# Patient Record
Sex: FEMALE | Race: WHITE | Employment: FULL TIME | URBAN - METROPOLITAN AREA
[De-identification: names, ages, dates, MRNs, and addresses within clinical notes are randomized per-mention and may not be internally consistent; named-entity substitution may affect disease eponyms.]

---

## 2017-06-10 ENCOUNTER — HOSPITAL ENCOUNTER (OUTPATIENT)
Dept: GENERAL RADIOLOGY | Facility: HOSPITAL | Age: 57
Discharge: HOME | End: 2017-06-10
Attending: INTERNAL MEDICINE
Payer: COMMERCIAL

## 2017-06-10 ENCOUNTER — LAB ENCOUNTER (OUTPATIENT)
Dept: LAB | Facility: HOSPITAL | Age: 57
End: 2017-06-10
Attending: INTERNAL MEDICINE
Payer: COMMERCIAL

## 2017-06-10 ENCOUNTER — OFFICE VISIT (OUTPATIENT)
Dept: INTERNAL MEDICINE CLINIC | Facility: CLINIC | Age: 57
End: 2017-06-10

## 2017-06-10 ENCOUNTER — PRIOR ORIGINAL RECORDS (OUTPATIENT)
Dept: OTHER | Age: 57
End: 2017-06-10

## 2017-06-10 VITALS
DIASTOLIC BLOOD PRESSURE: 80 MMHG | HEIGHT: 63.2 IN | TEMPERATURE: 98 F | WEIGHT: 138.31 LBS | HEART RATE: 116 BPM | BODY MASS INDEX: 24.2 KG/M2 | SYSTOLIC BLOOD PRESSURE: 124 MMHG | RESPIRATION RATE: 18 BRPM

## 2017-06-10 DIAGNOSIS — R06.00 DYSPNEA, UNSPECIFIED TYPE: ICD-10-CM

## 2017-06-10 DIAGNOSIS — Z00.00 ROUTINE PHYSICAL EXAMINATION: ICD-10-CM

## 2017-06-10 DIAGNOSIS — Z00.00 ROUTINE GENERAL MEDICAL EXAMINATION AT A HEALTH CARE FACILITY: Primary | ICD-10-CM

## 2017-06-10 DIAGNOSIS — Z00.00 ROUTINE PHYSICAL EXAMINATION: Primary | ICD-10-CM

## 2017-06-10 PROCEDURE — 36415 COLL VENOUS BLD VENIPUNCTURE: CPT

## 2017-06-10 PROCEDURE — 80061 LIPID PANEL: CPT

## 2017-06-10 PROCEDURE — 99386 PREV VISIT NEW AGE 40-64: CPT | Performed by: INTERNAL MEDICINE

## 2017-06-10 PROCEDURE — 85025 COMPLETE CBC W/AUTO DIFF WBC: CPT

## 2017-06-10 PROCEDURE — 80053 COMPREHEN METABOLIC PANEL: CPT

## 2017-06-10 PROCEDURE — 93010 ELECTROCARDIOGRAM REPORT: CPT | Performed by: INTERNAL MEDICINE

## 2017-06-10 PROCEDURE — 84443 ASSAY THYROID STIM HORMONE: CPT

## 2017-06-10 PROCEDURE — 93005 ELECTROCARDIOGRAM TRACING: CPT

## 2017-06-10 PROCEDURE — 82607 VITAMIN B-12: CPT

## 2017-06-10 PROCEDURE — 71020 XR CHEST PA + LAT CHEST (CPT=71020): CPT | Performed by: INTERNAL MEDICINE

## 2017-06-10 NOTE — PROGRESS NOTES
HPI:    Patient ID: Tabitha Culp is a 56year old female. HPI  Patient is here for her first office visit requesting physical exam.  Also with complaints of feeling fatigued and short of breath.   She has no significant past medical history or chronic Negative for joint pain. Skin: Negative for rash. Neurological: Positive for numbness and headaches. Negative for weakness. Hematological: Does not bruise/bleed easily. Psychiatric/Behavioral: Negative for depressed mood.  The patient is not nervous fibrillation. Exam otherwise normal.  We will check blood work, chest x-ray, and EKG. Contact patient with results. Further treatment and evaluation pending those initial results.   Encouraged to continue with healthy diet, regular exercise, maintaining

## 2017-06-12 ENCOUNTER — TELEPHONE (OUTPATIENT)
Dept: INTERNAL MEDICINE CLINIC | Facility: CLINIC | Age: 57
End: 2017-06-12

## 2017-06-12 NOTE — TELEPHONE ENCOUNTER
Pt called, message per Dr given.   Verbalized understanding and compliance  Scheduled to see PCP 6/15/17 in TCM appt time

## 2017-06-12 NOTE — TELEPHONE ENCOUNTER
----- Message from Lauren Vo MD sent at 6/11/2017  9:37 PM CDT -----  Please contact patient and review the results of the chest x-ray, EKG, and blood work. Blood work shows some borderline blood sugar and LDL cholesterol.   This is not concerning ri

## 2017-06-14 ENCOUNTER — HOSPITAL ENCOUNTER (EMERGENCY)
Facility: HOSPITAL | Age: 57
Discharge: HOME | End: 2017-06-14
Attending: EMERGENCY MEDICINE
Payer: COMMERCIAL

## 2017-06-14 ENCOUNTER — APPOINTMENT (OUTPATIENT)
Dept: GENERAL RADIOLOGY | Facility: HOSPITAL | Age: 57
End: 2017-06-14
Attending: EMERGENCY MEDICINE
Payer: COMMERCIAL

## 2017-06-14 ENCOUNTER — TELEPHONE (OUTPATIENT)
Dept: INTERNAL MEDICINE CLINIC | Facility: CLINIC | Age: 57
End: 2017-06-14

## 2017-06-14 VITALS
HEIGHT: 63 IN | WEIGHT: 150 LBS | DIASTOLIC BLOOD PRESSURE: 87 MMHG | BODY MASS INDEX: 26.58 KG/M2 | SYSTOLIC BLOOD PRESSURE: 112 MMHG | HEART RATE: 91 BPM | RESPIRATION RATE: 18 BRPM | OXYGEN SATURATION: 98 %

## 2017-06-14 DIAGNOSIS — S83.005A PATELLAR DISLOCATION, LEFT, INITIAL ENCOUNTER: Primary | ICD-10-CM

## 2017-06-14 PROCEDURE — 27560 TREAT KNEECAP DISLOCATION: CPT

## 2017-06-14 PROCEDURE — 96374 THER/PROPH/DIAG INJ IV PUSH: CPT

## 2017-06-14 PROCEDURE — 99284 EMERGENCY DEPT VISIT MOD MDM: CPT

## 2017-06-14 PROCEDURE — 73560 X-RAY EXAM OF KNEE 1 OR 2: CPT | Performed by: EMERGENCY MEDICINE

## 2017-06-14 RX ORDER — HYDROMORPHONE HYDROCHLORIDE 1 MG/ML
INJECTION, SOLUTION INTRAMUSCULAR; INTRAVENOUS; SUBCUTANEOUS
Status: COMPLETED
Start: 2017-06-14 | End: 2017-06-14

## 2017-06-14 RX ORDER — TRAMADOL HYDROCHLORIDE 50 MG/1
TABLET ORAL EVERY 4 HOURS PRN
Qty: 10 TABLET | Refills: 0 | Status: ON HOLD | OUTPATIENT
Start: 2017-06-14 | End: 2017-08-11

## 2017-06-14 RX ORDER — HYDROMORPHONE HYDROCHLORIDE 1 MG/ML
1 INJECTION, SOLUTION INTRAMUSCULAR; INTRAVENOUS; SUBCUTANEOUS ONCE
Status: COMPLETED | OUTPATIENT
Start: 2017-06-14 | End: 2017-06-14

## 2017-06-14 NOTE — TELEPHONE ENCOUNTER
Actions Requested: advise / FYI  Situation/Background   Problem: while sitting at computer suddenly right eye went black for 10 seconds and had dizziness   Onset: < 1 hour ago   Associated Symptoms: admits to not have eaten at that time and only coffee onb symptoms)  * Patient sounds very sick or weak to the triager  * Flashes of light  (Exception: brief from pressing on the eyeball)  * Eye pain and brief (now gone) blurred vision or visual changes  * Taking digoxin (e.g., Lanoxin, Digitek, Cardoxin, Lanoxic

## 2017-06-14 NOTE — TELEPHONE ENCOUNTER
Patient needs to be seen by eye doctor within the next day or so. I doubt this has anything to do with the recent issues of her heart arrhythmia.

## 2017-06-14 NOTE — TELEPHONE ENCOUNTER
Pt saw ophthalmologist Dr. Nathalia Leary (exam negative) about an hour ago and now seeing a retinal specialist, Beti Swift, Dr Nathalia Leary just wanted to make sure that there is nothing going on behind the eye, he told her her sxs were not normal and would not be rel

## 2017-06-14 NOTE — ED PROVIDER NOTES
Patient Seen in: [facility] AND [facility] Emergency Department    History   Patient presents with:  Lower Extremity Injury (musculoskeletal)    Stated Complaint: left knee dislocation    HPI    61-year-old female with history of patellar dislocation at age 26 a non tender, lungs are clear to auscultation  Cardiac: regular rate and rhythm  Musculoskeletal: Obvious lateral patellar dislocation to the left knee. No leg, ankle, or hip tenderness noted. Bilateral dorsalis pedis pulses intact and symmetric.   Neurolog

## 2017-06-15 ENCOUNTER — OFFICE VISIT (OUTPATIENT)
Dept: INTERNAL MEDICINE CLINIC | Facility: CLINIC | Age: 57
End: 2017-06-15

## 2017-06-15 VITALS
HEIGHT: 63.2 IN | BODY MASS INDEX: 24.15 KG/M2 | RESPIRATION RATE: 18 BRPM | TEMPERATURE: 99 F | WEIGHT: 138 LBS | HEART RATE: 106 BPM | SYSTOLIC BLOOD PRESSURE: 102 MMHG | DIASTOLIC BLOOD PRESSURE: 70 MMHG

## 2017-06-15 DIAGNOSIS — I48.91 ATRIAL FIBRILLATION, UNSPECIFIED TYPE (HCC): Primary | ICD-10-CM

## 2017-06-15 DIAGNOSIS — Z12.31 ENCOUNTER FOR SCREENING MAMMOGRAM FOR BREAST CANCER: ICD-10-CM

## 2017-06-15 PROCEDURE — 99213 OFFICE O/P EST LOW 20 MIN: CPT | Performed by: INTERNAL MEDICINE

## 2017-06-15 PROCEDURE — 99212 OFFICE O/P EST SF 10 MIN: CPT | Performed by: INTERNAL MEDICINE

## 2017-06-18 PROBLEM — I48.91 ATRIAL FIBRILLATION (HCC): Status: ACTIVE | Noted: 2017-06-18

## 2017-06-18 NOTE — PROGRESS NOTES
HPI:    Patient ID: La Nena Herrera is a 56year old female. HPI  Patient is here for follow-up on recent EKG finding. She was seen here a week or so ago. Is not feeling well. Heart rate was irregular. EKG showed atrial fibrillation.   Blood work was is nervous/anxious. Current Outpatient Prescriptions:  TraMADol HCl 50 MG Oral Tab Take 1-2 tablets ( mg total) by mouth every 4 (four) hours as needed for Pain.  Disp: 10 tablet Rfl: 0     Allergies:  Pcn [Penicillins]          PHYSICAL E

## 2017-06-20 ENCOUNTER — HOSPITAL ENCOUNTER (OUTPATIENT)
Dept: CV DIAGNOSTICS | Facility: HOSPITAL | Age: 57
Discharge: HOME | End: 2017-06-20
Attending: INTERNAL MEDICINE
Payer: COMMERCIAL

## 2017-06-20 DIAGNOSIS — I48.91 ATRIAL FIBRILLATION, UNSPECIFIED TYPE (HCC): ICD-10-CM

## 2017-06-20 PROCEDURE — 93306 TTE W/DOPPLER COMPLETE: CPT | Performed by: INTERNAL MEDICINE

## 2017-06-21 ENCOUNTER — TELEPHONE (OUTPATIENT)
Dept: INTERNAL MEDICINE CLINIC | Facility: CLINIC | Age: 57
End: 2017-06-21

## 2017-06-21 NOTE — TELEPHONE ENCOUNTER
Dr. Deon Bruce please see message below. Thank you    [facility] was informed that Dr. Pipo Baig would be assisting to have pt be seen sooner by  or possibly be prescribed medications until she could be seen by Cardiology. [facility] appt is scheduled for 7/12/17.  Echo

## 2017-06-21 NOTE — TELEPHONE ENCOUNTER
Pt requesting to speak with Augusta/IGNACIO  Said she was to call office today after having Echo 2D with Doppler  JSK was to assist in getting sooner appt with Cardiology  Or if medication would be prescribed  until she could be seen by Cardiology  Would like ca

## 2017-06-24 NOTE — TELEPHONE ENCOUNTER
I spoke at length with the patient regarding the echocardiogram findings. They showed decreased left ventricular ejection fraction at 30-35%. Also still with the atrial fibrillation.   Based on these issues, we will go ahead and start her on blood thinner

## 2017-06-26 NOTE — TELEPHONE ENCOUNTER
Dr. Lucien Blanco - Per Cardiology office, they can access the echo, EKG and your notes through [employer]. Thank you.

## 2017-06-26 NOTE — TELEPHONE ENCOUNTER
Dr. Bernard Calvert [facility] - patient called and stated she cannot have appointments for Mondays and Wednesdays. She will call Cardiology office and reschedule. I did fax the copies of her Echo, EKG and office visit notes at her request to your office.

## 2017-06-26 NOTE — TELEPHONE ENCOUNTER
Dr. Deon [employer] to change appointment to Wednesday, June 28th at 0830 am.  Left message on patient's VM to call back.

## 2017-06-30 ENCOUNTER — OFFICE VISIT (OUTPATIENT)
Dept: ORTHOPEDICS CLINIC | Facility: CLINIC | Age: 57
End: 2017-06-30

## 2017-06-30 DIAGNOSIS — S83.005A PATELLAR DISLOCATION, LEFT, INITIAL ENCOUNTER: Primary | ICD-10-CM

## 2017-06-30 PROCEDURE — A4467 BELT STRAP SLEEV GRMNT COVER: HCPCS | Performed by: ORTHOPAEDIC SURGERY

## 2017-06-30 PROCEDURE — 99212 OFFICE O/P EST SF 10 MIN: CPT | Performed by: ORTHOPAEDIC SURGERY

## 2017-06-30 PROCEDURE — 99203 OFFICE O/P NEW LOW 30 MIN: CPT | Performed by: ORTHOPAEDIC SURGERY

## 2017-06-30 RX ORDER — METOPROLOL SUCCINATE 50 MG/1
25 TABLET, EXTENDED RELEASE ORAL NIGHTLY
Refills: 2 | COMMUNITY
Start: 2017-06-29 | End: 2017-10-26 | Stop reason: DRUGHIGH

## 2017-06-30 RX ORDER — LISINOPRIL 5 MG/1
5 TABLET ORAL NIGHTLY
Refills: 2 | COMMUNITY
Start: 2017-06-29 | End: 2017-08-22

## 2017-06-30 NOTE — PROGRESS NOTES
6/30/2017  La Nena Sharon  16/1960  56year old   female  Miguel Krishna MD    HPI:   Patient presents with:  Knee Pain: Left - onset 6/14/17 when she dislocated her patella and she ended up in the ER - has x-rays [facility] in the system - had got a brace Heart valve disease   • Cancer Mother      ovarian      Smoking status: Never Smoker                                                              Alcohol use: Yes           0.0 oz/week          REVIEW OF SYSTEMS:   A 12 point review of systems was perf

## 2017-07-07 ENCOUNTER — HOSPITAL ENCOUNTER (OUTPATIENT)
Dept: MAMMOGRAPHY | Age: 57
Discharge: HOME | End: 2017-07-07
Attending: INTERNAL MEDICINE
Payer: COMMERCIAL

## 2017-07-07 DIAGNOSIS — Z12.31 ENCOUNTER FOR SCREENING MAMMOGRAM FOR BREAST CANCER: ICD-10-CM

## 2017-07-07 PROCEDURE — 77067 SCR MAMMO BI INCL CAD: CPT | Performed by: INTERNAL MEDICINE

## 2017-07-11 ENCOUNTER — OFFICE VISIT (OUTPATIENT)
Dept: PHYSICAL THERAPY | Age: 57
End: 2017-07-11
Attending: ORTHOPAEDIC SURGERY
Payer: COMMERCIAL

## 2017-07-11 DIAGNOSIS — S83.005A PATELLAR DISLOCATION, LEFT, INITIAL ENCOUNTER: ICD-10-CM

## 2017-07-11 PROCEDURE — 97162 PT EVAL MOD COMPLEX 30 MIN: CPT

## 2017-07-11 PROCEDURE — 97110 THERAPEUTIC EXERCISES: CPT

## 2017-07-11 NOTE — PROGRESS NOTES
PHYSICAL THERAPY EVALUATION:   Referring Physician: Dr. Mary Ayala  Diagnosis: Patellar dislocation, left,   Date of Onset: June 14,2107 Date of Service: 7/11/2017     PATIENT SUMMARY     Shilpi Gallagher is a 56year old y/o female who presents to therapy tod function            ASSESSMENT:   Paulino Vaca presented to therapy with diagnosis of Patellar dislocation, left,  . Paulino Vaca presented with following co-morbidities of newly diagnosed atrial fibrillation and states that her heart is only functioning by 30%.  She PLAN OF CARE:    Goals:    1. Pt will be I with HEP,its progression and management of symptoms  2. Pt will improve L knee extension to 0 and flexion to 130 for ease of donning/doffing shoes and socks  3. Pt will increase LLE strength and R hip strength to ha

## 2017-07-12 NOTE — TELEPHONE ENCOUNTER
[facility]   Transfer to [phone]  Patient was to call Cardiology office to reschedule appointment, instead showed up for cancelled appointment.

## 2017-07-14 ENCOUNTER — OFFICE VISIT (OUTPATIENT)
Dept: PHYSICAL THERAPY | Age: 57
End: 2017-07-14
Attending: ORTHOPAEDIC SURGERY
Payer: COMMERCIAL

## 2017-07-14 NOTE — PROGRESS NOTES
Dx: Patellar dislocation, left,                        Next MD visit: none scheduled  Fall Risk: standard         Precautions: n/a           Medications: Yes/no: no  Subjective: Pt  Pain level: 2/10    Objective:     Date:7/14/17 Date    Date    Date    Da

## 2017-07-18 ENCOUNTER — APPOINTMENT (OUTPATIENT)
Dept: PHYSICAL THERAPY | Age: 57
End: 2017-07-18
Attending: ORTHOPAEDIC SURGERY
Payer: COMMERCIAL

## 2017-07-21 ENCOUNTER — APPOINTMENT (OUTPATIENT)
Dept: PHYSICAL THERAPY | Age: 57
End: 2017-07-21
Attending: ORTHOPAEDIC SURGERY
Payer: COMMERCIAL

## 2017-07-25 ENCOUNTER — TELEPHONE (OUTPATIENT)
Dept: CARDIOLOGY CLINIC | Facility: CLINIC | Age: 57
End: 2017-07-25

## 2017-07-25 ENCOUNTER — OFFICE VISIT (OUTPATIENT)
Dept: CARDIOLOGY CLINIC | Facility: CLINIC | Age: 57
End: 2017-07-25

## 2017-07-25 ENCOUNTER — APPOINTMENT (OUTPATIENT)
Dept: PHYSICAL THERAPY | Age: 57
End: 2017-07-25
Attending: ORTHOPAEDIC SURGERY
Payer: COMMERCIAL

## 2017-07-25 VITALS
BODY MASS INDEX: 24 KG/M2 | HEART RATE: 68 BPM | RESPIRATION RATE: 20 BRPM | DIASTOLIC BLOOD PRESSURE: 58 MMHG | WEIGHT: 137 LBS | SYSTOLIC BLOOD PRESSURE: 100 MMHG

## 2017-07-25 DIAGNOSIS — I48.91 ATRIAL FIBRILLATION, UNSPECIFIED TYPE (HCC): Primary | ICD-10-CM

## 2017-07-25 DIAGNOSIS — I42.0 DILATED CARDIOMYOPATHY (HCC): ICD-10-CM

## 2017-07-25 DIAGNOSIS — E78.00 HYPERCHOLESTEREMIA: ICD-10-CM

## 2017-07-25 PROCEDURE — 99212 OFFICE O/P EST SF 10 MIN: CPT | Performed by: INTERNAL MEDICINE

## 2017-07-25 PROCEDURE — 99244 OFF/OP CNSLTJ NEW/EST MOD 40: CPT | Performed by: INTERNAL MEDICINE

## 2017-07-25 NOTE — H&P
Donna Marlow is a 56year old female. HPI:   This is a pleasant 82-year-old with insignificant cardiac history who now presents for a second opinion after she was found to have atrial fibrillation in June during a routine exam by her primary.   Patient s 2014    Surgical repair; ok since       Social History:  Smoking status: Never Smoker                                                              Alcohol use: Yes           0.0 oz/week       REVIEW OF SYSTEMS:   GENERAL HEALTH: feels well otherwise  SKIN: tachycardia from her A. fib or viral in origin. She is on good medicines at this time which can be titrated as tolerated especially the metoprolol to diminish palpitations.   Keep lisinopril at the present dose for now we will reassess her LV function afte

## 2017-07-25 NOTE — PATIENT INSTRUCTIONS
Call with medication doses so we can adjust metoprolol if needed  Get a copy of your CT scan of the heart and results to be reviewed  If testing is stable will arrange for cardioversion in 2 weeks

## 2017-07-25 NOTE — TELEPHONE ENCOUNTER
Pt just saw MDB--she is taking Metoprolol 50mg tabs, once at bedtime. Pt would like to know if MDB wants to increase dose to twice daily?

## 2017-07-25 NOTE — TELEPHONE ENCOUNTER
Informed pt per [facility] message below. Increase Metoprolol 75mg. Verbalized understanding. Per pt  Awaiting call from Dr. Chuy Light regarding test results.

## 2017-07-26 ENCOUNTER — TELEPHONE (OUTPATIENT)
Dept: CARDIOLOGY CLINIC | Facility: CLINIC | Age: 57
End: 2017-07-26

## 2017-07-26 DIAGNOSIS — I42.9 CARDIOMYOPATHY, UNSPECIFIED TYPE (HCC): ICD-10-CM

## 2017-07-26 DIAGNOSIS — Z01.818 ENCOUNTER FOR PREADMISSION TESTING: Primary | ICD-10-CM

## 2017-07-26 DIAGNOSIS — I48.91 ATRIAL FIBRILLATION, UNSPECIFIED TYPE (HCC): ICD-10-CM

## 2017-07-27 ENCOUNTER — APPOINTMENT (OUTPATIENT)
Dept: PHYSICAL THERAPY | Age: 57
End: 2017-07-27
Attending: ORTHOPAEDIC SURGERY
Payer: COMMERCIAL

## 2017-07-27 NOTE — TELEPHONE ENCOUNTER
S/w Pablo ROBERTO [facility] [place] (back of card)- no PA needed for cardioversion. Pt made aware.

## 2017-07-31 ENCOUNTER — TELEPHONE (OUTPATIENT)
Dept: FAMILY MEDICINE CLINIC | Facility: CLINIC | Age: 57
End: 2017-07-31

## 2017-07-31 NOTE — TELEPHONE ENCOUNTER
----- Message from Micah Vasquez MD sent at 7/28/2017  3:28 PM CDT -----  Noted.  Please call the patient and make sure that the [address] has contacted her to get the additional views

## 2017-07-31 NOTE — TELEPHONE ENCOUNTER
Dr Arvind Cline,    Called [facility] dept [phone] (option*) Stefany Neri confirmed pt has not scheduled her mmg add'l views.    Pt confirmed not done yet because dealing with heart issues with Dr Anil Pro first. Pt is having cardioversion done on August 11th by Dr Dowd Child

## 2017-08-01 ENCOUNTER — APPOINTMENT (OUTPATIENT)
Dept: PHYSICAL THERAPY | Age: 57
End: 2017-08-01
Attending: ORTHOPAEDIC SURGERY
Payer: COMMERCIAL

## 2017-08-04 ENCOUNTER — APPOINTMENT (OUTPATIENT)
Dept: PHYSICAL THERAPY | Age: 57
End: 2017-08-04
Attending: ORTHOPAEDIC SURGERY
Payer: COMMERCIAL

## 2017-08-08 ENCOUNTER — LAB ENCOUNTER (OUTPATIENT)
Dept: LAB | Age: 57
End: 2017-08-08
Attending: INTERNAL MEDICINE
Payer: COMMERCIAL

## 2017-08-08 DIAGNOSIS — Z01.818 ENCOUNTER FOR PREADMISSION TESTING: Primary | ICD-10-CM

## 2017-08-08 DIAGNOSIS — I42.9 CARDIOMYOPATHY, UNSPECIFIED TYPE (HCC): ICD-10-CM

## 2017-08-08 DIAGNOSIS — I48.91 ATRIAL FIBRILLATION, UNSPECIFIED TYPE (HCC): ICD-10-CM

## 2017-08-08 LAB
ANION GAP SERPL CALC-SCNC: 8 MMOL/L (ref 0–18)
B-HCG UR QL: NEGATIVE
BUN SERPL-MCNC: 16 MG/DL (ref 8–20)
BUN/CREAT SERPL: 18.6 (ref 10–20)
CALCIUM SERPL-MCNC: 9.3 MG/DL (ref 8.5–10.5)
CHLORIDE SERPL-SCNC: 104 MMOL/L (ref 95–110)
CO2 SERPL-SCNC: 25 MMOL/L (ref 22–32)
CREAT SERPL-MCNC: 0.86 MG/DL (ref 0.5–1.5)
GLUCOSE SERPL-MCNC: 116 MG/DL (ref 70–99)
OSMOLALITY UR CALC.SUM OF ELEC: 286 MOSM/KG (ref 275–295)
POTASSIUM SERPL-SCNC: 4.4 MMOL/L (ref 3.3–5.1)
SODIUM SERPL-SCNC: 137 MMOL/L (ref 136–144)

## 2017-08-08 PROCEDURE — 36415 COLL VENOUS BLD VENIPUNCTURE: CPT

## 2017-08-08 PROCEDURE — 81025 URINE PREGNANCY TEST: CPT

## 2017-08-08 PROCEDURE — 80048 BASIC METABOLIC PNL TOTAL CA: CPT

## 2017-08-10 ENCOUNTER — TELEPHONE (OUTPATIENT)
Dept: CARDIOLOGY CLINIC | Facility: CLINIC | Age: 57
End: 2017-08-10

## 2017-08-10 RX ORDER — SODIUM CHLORIDE 9 MG/ML
INJECTION, SOLUTION INTRAVENOUS ONCE
Status: COMPLETED | OUTPATIENT
Start: 2017-08-11 | End: 2017-08-11

## 2017-08-10 NOTE — TELEPHONE ENCOUNTER
Pt called to get time for procedure with MB  Tomorrow. Pt would also like to know if she needs to fast for procedure.  Please Call Thank yOu

## 2017-08-10 NOTE — TELEPHONE ENCOUNTER
S/w cath lab RN- was busy, will call pt shortly. Left vm to pt that cath lab RN will contact pt soon.

## 2017-08-11 ENCOUNTER — HOSPITAL ENCOUNTER (OUTPATIENT)
Dept: INTERVENTIONAL RADIOLOGY/VASCULAR | Facility: HOSPITAL | Age: 57
Discharge: HOME | End: 2017-08-11
Attending: INTERNAL MEDICINE | Admitting: INTERNAL MEDICINE
Payer: COMMERCIAL

## 2017-08-11 VITALS
WEIGHT: 136 LBS | SYSTOLIC BLOOD PRESSURE: 100 MMHG | OXYGEN SATURATION: 99 % | BODY MASS INDEX: 24 KG/M2 | DIASTOLIC BLOOD PRESSURE: 56 MMHG | HEART RATE: 52 BPM | RESPIRATION RATE: 15 BRPM

## 2017-08-11 DIAGNOSIS — I48.91 A-FIB (HCC): ICD-10-CM

## 2017-08-11 DIAGNOSIS — I42.9 CARDIOMYOPATHY (HCC): ICD-10-CM

## 2017-08-11 PROCEDURE — 93005 ELECTROCARDIOGRAM TRACING: CPT

## 2017-08-11 PROCEDURE — 93010 ELECTROCARDIOGRAM REPORT: CPT | Performed by: INTERNAL MEDICINE

## 2017-08-11 PROCEDURE — 5A2204Z RESTORATION OF CARDIAC RHYTHM, SINGLE: ICD-10-PCS | Performed by: INTERNAL MEDICINE

## 2017-08-11 PROCEDURE — 92960 CARDIOVERSION ELECTRIC EXT: CPT

## 2017-08-11 RX ORDER — SODIUM CHLORIDE 9 MG/ML
INJECTION, SOLUTION INTRAVENOUS
Status: COMPLETED
Start: 2017-08-11 | End: 2017-08-11

## 2017-08-11 RX ADMIN — SODIUM CHLORIDE: 9 INJECTION, SOLUTION INTRAVENOUS at 08:00:00

## 2017-08-11 NOTE — PROGRESS NOTES
Cardiolgy:  Procedure: Cardioversion  Indication: atrial fibrillation  Diagnosis post procedure: Sinus rhythm  Sedation Brevitol   45 Mg  Patient cardioverted with    200  Joules sync. to sinus rhythm  No complications    Nohemy Velasquez MD [facility]

## 2017-08-21 ENCOUNTER — TELEPHONE (OUTPATIENT)
Dept: CARDIOLOGY CLINIC | Facility: CLINIC | Age: 57
End: 2017-08-21

## 2017-08-21 NOTE — TELEPHONE ENCOUNTER
Pt states she thinks she went into afib. She states she has noticed it is difficult for her to lay flat and feels a gurgling/wheezing in her throat. She has been havng a difficult tiME sleeping as well. She was advised to see MDB tomorrow. appt scheduled.

## 2017-08-22 ENCOUNTER — OFFICE VISIT (OUTPATIENT)
Dept: CARDIOLOGY CLINIC | Facility: CLINIC | Age: 57
End: 2017-08-22

## 2017-08-22 VITALS
HEART RATE: 42 BPM | DIASTOLIC BLOOD PRESSURE: 70 MMHG | HEIGHT: 63.5 IN | SYSTOLIC BLOOD PRESSURE: 105 MMHG | BODY MASS INDEX: 24.5 KG/M2 | WEIGHT: 140 LBS | OXYGEN SATURATION: 97 %

## 2017-08-22 DIAGNOSIS — I48.91 ATRIAL FIBRILLATION, UNSPECIFIED TYPE (HCC): Primary | ICD-10-CM

## 2017-08-22 DIAGNOSIS — I42.0 DILATED CARDIOMYOPATHY (HCC): ICD-10-CM

## 2017-08-22 PROCEDURE — 99214 OFFICE O/P EST MOD 30 MIN: CPT | Performed by: INTERNAL MEDICINE

## 2017-08-22 PROCEDURE — 99212 OFFICE O/P EST SF 10 MIN: CPT | Performed by: INTERNAL MEDICINE

## 2017-08-22 RX ORDER — LOSARTAN POTASSIUM 50 MG/1
50 TABLET ORAL DAILY
Qty: 30 TABLET | Refills: 5 | Status: SHIPPED | OUTPATIENT
Start: 2017-08-22 | End: 2017-10-19

## 2017-08-22 NOTE — PATIENT INSTRUCTIONS
30 day event monitor to assess heart racing  Stop lisinopril  Start losartan 50 mg daily.   If dizzy or lightheaded cut the dose to 25 mg daily  Start exercising more regularly  Echocardiogram in 4 weeks just prior to follow-up visit  May try Benadryl 25 mg

## 2017-08-22 NOTE — PROGRESS NOTES
Jemima Eisenberg is a 56year old female. Patient presents with:  Atrial Fibrillation: c/o of palpatations and gurgling and weight in her upper chest.  c/o of being tired. Low HR. HPI:   This is a pleasant 24-year-old with history of dilated myopathy A.  f (!) 42   Ht 5' 3.5\" (1.613 m)   Wt 140 lb (63.5 kg)   SpO2 97%   BMI 24.41 kg/m²   GENERAL: well developed, well nourished,in no apparent distress  SKIN: no rashes,no suspicious lesions  HEENT: atraumatic, normocephalic,ears and throat are clear  NECK: apple

## 2017-08-23 ENCOUNTER — TELEPHONE (OUTPATIENT)
Dept: CARDIOLOGY CLINIC | Facility: CLINIC | Age: 57
End: 2017-08-23

## 2017-09-05 ENCOUNTER — HOSPITAL ENCOUNTER (OUTPATIENT)
Dept: CV DIAGNOSTICS | Facility: HOSPITAL | Age: 57
Discharge: HOME | End: 2017-09-05
Attending: INTERNAL MEDICINE
Payer: COMMERCIAL

## 2017-09-05 DIAGNOSIS — I42.0 DILATED CARDIOMYOPATHY (HCC): ICD-10-CM

## 2017-09-05 PROCEDURE — 93306 TTE W/DOPPLER COMPLETE: CPT | Performed by: INTERNAL MEDICINE

## 2017-09-26 ENCOUNTER — OFFICE VISIT (OUTPATIENT)
Dept: CARDIOLOGY CLINIC | Facility: CLINIC | Age: 57
End: 2017-09-26

## 2017-09-26 VITALS
BODY MASS INDEX: 23 KG/M2 | WEIGHT: 134 LBS | DIASTOLIC BLOOD PRESSURE: 70 MMHG | SYSTOLIC BLOOD PRESSURE: 100 MMHG | HEART RATE: 82 BPM | RESPIRATION RATE: 18 BRPM

## 2017-09-26 DIAGNOSIS — I48.91 ATRIAL FIBRILLATION, UNSPECIFIED TYPE (HCC): Primary | ICD-10-CM

## 2017-09-26 DIAGNOSIS — I42.0 DILATED CARDIOMYOPATHY (HCC): ICD-10-CM

## 2017-09-26 PROCEDURE — 93005 ELECTROCARDIOGRAM TRACING: CPT | Performed by: INTERNAL MEDICINE

## 2017-09-26 PROCEDURE — 93000 ELECTROCARDIOGRAM COMPLETE: CPT | Performed by: INTERNAL MEDICINE

## 2017-09-26 PROCEDURE — 99212 OFFICE O/P EST SF 10 MIN: CPT | Performed by: INTERNAL MEDICINE

## 2017-09-26 PROCEDURE — 99214 OFFICE O/P EST MOD 30 MIN: CPT | Performed by: INTERNAL MEDICINE

## 2017-09-26 NOTE — PROGRESS NOTES
Pablo Philip is a 56year old female. Patient presents with:  Atrial Fibrillation: HR during workouts from 140 to 80  Cardiomyopathy  Dyspnea: stairs    HPI:   This is a pleasant 56-year-old with history of dilated cardiomyopathy and A. fib who presents f Wt 134 lb (60.8 kg)   BMI 23.36 kg/m²   GENERAL: well developed, well nourished,in no apparent distress  SKIN: no rashes,no suspicious lesions  HEENT: atraumatic, normocephalic,ears and throat are clear  NECK: supple,no adenopathy,no bruits  LUNGS: clear

## 2017-09-26 NOTE — PATIENT INSTRUCTIONS
Try increasing metoprolol back to 75 mg daily. If more tired dizzy or short of breath resume prior 50 mg dosing  Make an appointment to see Dr. Eder Hicks.   If weight is longer than 2-3 weeks please schedule at other office  Get a heart rate monitor to monitor

## 2017-10-06 LAB
ALBUMIN: 4.2 G/DL
ALKALINE PHOSPHATATE(ALK PHOS): 65 IU/L
ALT (SGPT): 23 U/L
AST (SGOT): 22 U/L
BILIRUBIN TOTAL: 1.1 MG/DL
BUN: 12 MG/DL
CALCIUM: 9.5 MG/DL
CHLORIDE: 102 MEQ/L
CHOLESTEROL, TOTAL: 200 MG/DL
CREATININE, SERUM: 0.83 MG/DL
GLOBULIN: 3.2 G/DL
GLUCOSE: 118 MG/DL
GLUCOSE: 118 MG/DL
HDL CHOLESTEROL: 53 MG/DL
HEMATOCRIT: 45.5 %
HEMOGLOBIN: 15.2 G/DL
LDL CHOLESTEROL: 132 MG/DL
MCH: 30.7 PG
MCHC: 33.5 G/DL
MCV: 91.6 FL
NON-HDL CHOLESTEROL: 147 MG/DL
PLATELETS: 220 K/UL
POTASSIUM, SERUM: 4.4 MEQ/L
PROTEIN, TOTAL: 7.4 G/DL
RED BLOOD COUNT: 4.96 X 10-6/U
SGOT (AST): 22 IU/L
SGPT (ALT): 23 IU/L
SODIUM: 140 MEQ/L
THYROID STIMULATING HORMONE: 2.6 MLU/L
TRIGLYCERIDES: 73 MG/DL
WHITE BLOOD COUNT: 7.4 X 10-3/U

## 2017-10-10 ENCOUNTER — TELEPHONE (OUTPATIENT)
Dept: INTERNAL MEDICINE CLINIC | Facility: CLINIC | Age: 57
End: 2017-10-10

## 2017-10-10 ENCOUNTER — PRIOR ORIGINAL RECORDS (OUTPATIENT)
Dept: OTHER | Age: 57
End: 2017-10-10

## 2017-10-10 NOTE — TELEPHONE ENCOUNTER
Dr. Medhat Beasley wants to give an FYI to Dr. Felton Driscoll regarding pt.   Pt is having a cardio version with anesthesia on oct 27, 217 at [address]

## 2017-10-16 ENCOUNTER — PRIOR ORIGINAL RECORDS (OUTPATIENT)
Dept: OTHER | Age: 57
End: 2017-10-16

## 2017-10-20 PROBLEM — H80.92 OTOSCLEROSIS OF LEFT EAR: Status: ACTIVE | Noted: 2017-10-20

## 2017-10-24 ENCOUNTER — PRIOR ORIGINAL RECORDS (OUTPATIENT)
Dept: OTHER | Age: 57
End: 2017-10-24

## 2017-10-24 ENCOUNTER — APPOINTMENT (OUTPATIENT)
Dept: LAB | Facility: HOSPITAL | Age: 57
End: 2017-10-24
Attending: INTERNAL MEDICINE
Payer: COMMERCIAL

## 2017-10-24 DIAGNOSIS — I48.91 ATRIAL FIBRILLATION, UNSPECIFIED TYPE (HCC): ICD-10-CM

## 2017-10-24 LAB
BUN: 17 MG/DL
CALCIUM: 9.5 MG/DL
CHLORIDE: 104 MEQ/L
CREATININE, SERUM: 0.83 MG/DL
GLUCOSE: 102 MG/DL
MAGNESIUM: 1.8 MG/DL
POTASSIUM, SERUM: 4.4 MEQ/L
SODIUM: 138 MEQ/L

## 2017-10-24 PROCEDURE — 80048 BASIC METABOLIC PNL TOTAL CA: CPT

## 2017-10-24 PROCEDURE — 83735 ASSAY OF MAGNESIUM: CPT

## 2017-10-24 PROCEDURE — 36415 COLL VENOUS BLD VENIPUNCTURE: CPT

## 2017-10-26 RX ORDER — METOPROLOL SUCCINATE 25 MG/1
25 TABLET, EXTENDED RELEASE ORAL DAILY
COMMUNITY
End: 2017-10-27

## 2017-10-26 RX ORDER — SODIUM CHLORIDE 9 MG/ML
INJECTION, SOLUTION INTRAVENOUS ONCE
Status: COMPLETED | OUTPATIENT
Start: 2017-10-27 | End: 2017-10-27

## 2017-10-26 RX ORDER — LOSARTAN POTASSIUM 50 MG/1
50 TABLET ORAL NIGHTLY
COMMUNITY
End: 2017-12-19

## 2017-10-27 ENCOUNTER — ANESTHESIA EVENT (OUTPATIENT)
Dept: INTERVENTIONAL RADIOLOGY/VASCULAR | Facility: HOSPITAL | Age: 57
End: 2017-10-27
Payer: COMMERCIAL

## 2017-10-27 ENCOUNTER — HOSPITAL ENCOUNTER (OUTPATIENT)
Dept: INTERVENTIONAL RADIOLOGY/VASCULAR | Facility: HOSPITAL | Age: 57
Discharge: HOME | End: 2017-10-27
Attending: INTERNAL MEDICINE | Admitting: INTERNAL MEDICINE
Payer: COMMERCIAL

## 2017-10-27 VITALS
WEIGHT: 134 LBS | RESPIRATION RATE: 16 BRPM | TEMPERATURE: 97 F | HEART RATE: 61 BPM | OXYGEN SATURATION: 100 % | SYSTOLIC BLOOD PRESSURE: 106 MMHG | BODY MASS INDEX: 23 KG/M2 | DIASTOLIC BLOOD PRESSURE: 69 MMHG

## 2017-10-27 DIAGNOSIS — I48.91 A-FIB (HCC): ICD-10-CM

## 2017-10-27 PROCEDURE — 5A2204Z RESTORATION OF CARDIAC RHYTHM, SINGLE: ICD-10-PCS | Performed by: INTERNAL MEDICINE

## 2017-10-27 PROCEDURE — 93005 ELECTROCARDIOGRAM TRACING: CPT

## 2017-10-27 PROCEDURE — 92960 CARDIOVERSION ELECTRIC EXT: CPT

## 2017-10-27 PROCEDURE — 93010 ELECTROCARDIOGRAM REPORT: CPT | Performed by: INTERNAL MEDICINE

## 2017-10-27 RX ORDER — NALOXONE HYDROCHLORIDE 0.4 MG/ML
80 INJECTION, SOLUTION INTRAMUSCULAR; INTRAVENOUS; SUBCUTANEOUS AS NEEDED
Status: DISCONTINUED | OUTPATIENT
Start: 2017-10-27 | End: 2017-10-27

## 2017-10-27 RX ORDER — SOTALOL HYDROCHLORIDE 80 MG/1
80 TABLET ORAL EVERY 12 HOURS SCHEDULED
Qty: 90 TABLET | Refills: 4 | Status: SHIPPED | OUTPATIENT
Start: 2017-10-27 | End: 2017-12-07

## 2017-10-27 RX ORDER — ONDANSETRON 2 MG/ML
4 INJECTION INTRAMUSCULAR; INTRAVENOUS ONCE AS NEEDED
Status: DISCONTINUED | OUTPATIENT
Start: 2017-10-27 | End: 2017-10-27

## 2017-10-27 RX ORDER — HYDROCODONE BITARTRATE AND ACETAMINOPHEN 5; 325 MG/1; MG/1
1 TABLET ORAL AS NEEDED
Status: DISCONTINUED | OUTPATIENT
Start: 2017-10-27 | End: 2017-10-27

## 2017-10-27 RX ORDER — MORPHINE SULFATE 10 MG/ML
6 INJECTION, SOLUTION INTRAMUSCULAR; INTRAVENOUS EVERY 10 MIN PRN
Status: DISCONTINUED | OUTPATIENT
Start: 2017-10-27 | End: 2017-10-27

## 2017-10-27 RX ORDER — SODIUM CHLORIDE 9 MG/ML
INJECTION, SOLUTION INTRAVENOUS
Status: COMPLETED
Start: 2017-10-27 | End: 2017-10-27

## 2017-10-27 RX ORDER — MORPHINE SULFATE 4 MG/ML
4 INJECTION, SOLUTION INTRAMUSCULAR; INTRAVENOUS EVERY 10 MIN PRN
Status: DISCONTINUED | OUTPATIENT
Start: 2017-10-27 | End: 2017-10-27

## 2017-10-27 RX ORDER — HYDROMORPHONE HYDROCHLORIDE 1 MG/ML
0.2 INJECTION, SOLUTION INTRAMUSCULAR; INTRAVENOUS; SUBCUTANEOUS EVERY 5 MIN PRN
Status: DISCONTINUED | OUTPATIENT
Start: 2017-10-27 | End: 2017-10-27

## 2017-10-27 RX ORDER — METOPROLOL TARTRATE 5 MG/5ML
2.5 INJECTION INTRAVENOUS ONCE
Status: DISCONTINUED | OUTPATIENT
Start: 2017-10-27 | End: 2017-10-27

## 2017-10-27 RX ORDER — MORPHINE SULFATE 4 MG/ML
2 INJECTION, SOLUTION INTRAMUSCULAR; INTRAVENOUS EVERY 10 MIN PRN
Status: DISCONTINUED | OUTPATIENT
Start: 2017-10-27 | End: 2017-10-27

## 2017-10-27 RX ORDER — HYDROMORPHONE HYDROCHLORIDE 1 MG/ML
0.6 INJECTION, SOLUTION INTRAMUSCULAR; INTRAVENOUS; SUBCUTANEOUS EVERY 5 MIN PRN
Status: DISCONTINUED | OUTPATIENT
Start: 2017-10-27 | End: 2017-10-27

## 2017-10-27 RX ORDER — SODIUM CHLORIDE 0.9 % (FLUSH) 0.9 %
10 SYRINGE (ML) INJECTION AS NEEDED
Status: DISCONTINUED | OUTPATIENT
Start: 2017-10-27 | End: 2017-10-27

## 2017-10-27 RX ORDER — SODIUM CHLORIDE, SODIUM LACTATE, POTASSIUM CHLORIDE, CALCIUM CHLORIDE 600; 310; 30; 20 MG/100ML; MG/100ML; MG/100ML; MG/100ML
INJECTION, SOLUTION INTRAVENOUS CONTINUOUS
Status: DISCONTINUED | OUTPATIENT
Start: 2017-10-27 | End: 2017-10-27

## 2017-10-27 RX ORDER — SOTALOL HYDROCHLORIDE 80 MG/1
120 TABLET ORAL EVERY 12 HOURS SCHEDULED
Status: DISCONTINUED | OUTPATIENT
Start: 2017-10-27 | End: 2017-10-27

## 2017-10-27 RX ORDER — HYDROCODONE BITARTRATE AND ACETAMINOPHEN 5; 325 MG/1; MG/1
2 TABLET ORAL AS NEEDED
Status: DISCONTINUED | OUTPATIENT
Start: 2017-10-27 | End: 2017-10-27

## 2017-10-27 RX ORDER — SODIUM CHLORIDE 9 MG/ML
INJECTION, SOLUTION INTRAVENOUS CONTINUOUS PRN
Status: DISCONTINUED | OUTPATIENT
Start: 2017-10-27 | End: 2017-10-27 | Stop reason: SURG

## 2017-10-27 RX ORDER — HYDROMORPHONE HYDROCHLORIDE 1 MG/ML
0.4 INJECTION, SOLUTION INTRAMUSCULAR; INTRAVENOUS; SUBCUTANEOUS EVERY 5 MIN PRN
Status: DISCONTINUED | OUTPATIENT
Start: 2017-10-27 | End: 2017-10-27

## 2017-10-27 RX ADMIN — SODIUM CHLORIDE: 9 INJECTION, SOLUTION INTRAVENOUS at 12:38:00

## 2017-10-27 RX ADMIN — SODIUM CHLORIDE: 9 INJECTION, SOLUTION INTRAVENOUS at 12:57:00

## 2017-10-27 RX ADMIN — SODIUM CHLORIDE: 9 INJECTION, SOLUTION INTRAVENOUS at 12:50:00

## 2017-10-27 NOTE — ANESTHESIA POSTPROCEDURE EVALUATION
Patient: Latia Leon    Procedure Summary     Date:  10/27/17 Room / Location:  [facility]    Anesthesia Start:  1250 Anesthesia Stop:  0543    Procedure:  EP CARDIOVERSION 1X Diagnosis:  A-fib ([facility] [address]    Scheduled Providers:

## 2017-10-27 NOTE — PROCEDURES
[facility]    Cardiac Electrophysiology Procedure Note    [facility] [id number] MRN [id number]   Admission Date 10/27/2017 Procedure Date 10/27/2017   Attending Physician Kelli Posada MD Procedure Physici

## 2017-10-27 NOTE — ANESTHESIA PREPROCEDURE EVALUATION
Anesthesia PreOp Note    HPI:     Hardik Evans is a 56year old female who presents for preoperative consultation requested by: * No surgeons listed *    Date of Surgery: 10/27/2017    * No procedures listed *  Indication: * No pre-op diagnosis entered * Smokeless tobacco: Never Used    Alcohol use Yes  0.0 oz/week     Drug use: No    Sexual activity: Not on file     Other Topics Concern   None on file     Social History Narrative   None on file       Available pre-op labs reviewed.     Lab Results  Compone alternative forms of anesthetic management. All of the patient's questions were answered to the best of my ability. The patient desires the anesthetic management as planned.   MARIANO GONZALEZ  10/27/2017 12:44 PM

## 2017-11-03 ENCOUNTER — TELEPHONE (OUTPATIENT)
Dept: CARDIOLOGY CLINIC | Facility: CLINIC | Age: 57
End: 2017-11-03

## 2017-11-03 NOTE — TELEPHONE ENCOUNTER
Pt states that she has appt with both Dr. Kelsey Mena (at [facility] DrManjula Dye (at [place]) on 11/30/17. Does she need to see both doctors? Please call.

## 2017-11-03 NOTE — TELEPHONE ENCOUNTER
Advised pt to cancel with Dr. Damaris Mathis and keep appt with Dr. Severiano Sabillon since afib main issue.

## 2017-11-04 NOTE — TELEPHONE ENCOUNTER
Please advise on refill request.   Last office visit with Dr Jr Iverson 6/15/17.   Recent Outpatient Visits            2 weeks ago Chronic vasomotor rhinitis    [facility] E[facility] Beto Ma MD    Office Visit    2 weeks ago Hallux varus, left    Pod

## 2017-11-06 RX ORDER — RIVAROXABAN 20 MG/1
TABLET, FILM COATED ORAL
Qty: 90 TABLET | Refills: 1 | Status: SHIPPED | OUTPATIENT
Start: 2017-11-06 | End: 2018-05-15

## 2017-11-30 ENCOUNTER — PRIOR ORIGINAL RECORDS (OUTPATIENT)
Dept: OTHER | Age: 57
End: 2017-11-30

## 2017-12-07 ENCOUNTER — LAB ENCOUNTER (OUTPATIENT)
Dept: LAB | Facility: HOSPITAL | Age: 57
End: 2017-12-07
Attending: INTERNAL MEDICINE
Payer: COMMERCIAL

## 2017-12-07 ENCOUNTER — OFFICE VISIT (OUTPATIENT)
Dept: INTERNAL MEDICINE CLINIC | Facility: CLINIC | Age: 57
End: 2017-12-07

## 2017-12-07 VITALS
BODY MASS INDEX: 23.8 KG/M2 | SYSTOLIC BLOOD PRESSURE: 117 MMHG | RESPIRATION RATE: 16 BRPM | HEART RATE: 125 BPM | DIASTOLIC BLOOD PRESSURE: 83 MMHG | WEIGHT: 136 LBS | HEIGHT: 63.5 IN

## 2017-12-07 DIAGNOSIS — Z01.818 PRE-OP EXAMINATION: ICD-10-CM

## 2017-12-07 DIAGNOSIS — I48.91 ATRIAL FIBRILLATION, UNSPECIFIED TYPE (HCC): ICD-10-CM

## 2017-12-07 DIAGNOSIS — Z01.818 PRE-OP EXAMINATION: Primary | ICD-10-CM

## 2017-12-07 DIAGNOSIS — I42.0 DILATED CARDIOMYOPATHY (HCC): ICD-10-CM

## 2017-12-07 DIAGNOSIS — H91.90 HEARING LOSS, UNSPECIFIED HEARING LOSS TYPE, UNSPECIFIED LATERALITY: ICD-10-CM

## 2017-12-07 PROCEDURE — 36415 COLL VENOUS BLD VENIPUNCTURE: CPT

## 2017-12-07 PROCEDURE — 99212 OFFICE O/P EST SF 10 MIN: CPT | Performed by: INTERNAL MEDICINE

## 2017-12-07 PROCEDURE — 85025 COMPLETE CBC W/AUTO DIFF WBC: CPT

## 2017-12-07 PROCEDURE — 99213 OFFICE O/P EST LOW 20 MIN: CPT | Performed by: INTERNAL MEDICINE

## 2017-12-07 PROCEDURE — 80048 BASIC METABOLIC PNL TOTAL CA: CPT

## 2017-12-07 NOTE — PROGRESS NOTES
HPI:    Patient ID: Dg Jose is a 56year old female. HPI  Patient is here for preoperative evaluation. She is undergoing a stapedectomy due to decreased hearing in her left ear.   She is already received clearance from her cardiologist.  She will chest pain. Gastrointestinal: Negative for nausea, vomiting, abdominal pain, diarrhea and constipation. Genitourinary: Negative for dysuria, hematuria, vaginal discharge, menstrual problem and sexual dysfunction.    Musculoskeletal: Negative for joint p Thought content normal.              ASSESSMENT/PLAN:   (Z01.818) Pre-op examination  (primary encounter diagnosis)  Plan: CBC WITH DIFFERENTIAL WITH PLATELET, BASIC         METABOLIC PANEL (8), CANCELED: EKG 12-LEAD         Patient here for preoperative e

## 2017-12-13 PROCEDURE — 88311 DECALCIFY TISSUE: CPT | Performed by: OTOLARYNGOLOGY

## 2017-12-13 PROCEDURE — 88304 TISSUE EXAM BY PATHOLOGIST: CPT | Performed by: OTOLARYNGOLOGY

## 2017-12-19 ENCOUNTER — TELEPHONE (OUTPATIENT)
Dept: CARDIOLOGY CLINIC | Facility: CLINIC | Age: 57
End: 2017-12-19

## 2017-12-19 RX ORDER — LOSARTAN POTASSIUM 50 MG/1
50 TABLET ORAL NIGHTLY
Qty: 30 TABLET | Refills: 4 | Status: SHIPPED | OUTPATIENT
Start: 2017-12-19 | End: 2018-05-17

## 2017-12-19 NOTE — TELEPHONE ENCOUNTER
Losartan 50mg tabs, take 1 tab (50mg) by mouth daily, qty 90----pt is requesting to dispense a 90 day supply    Current Outpatient Prescriptions:   •  Losartan Potassium 50 MG Oral Tab, Take 50 mg by mouth nightly., Disp: , Rfl:

## 2018-01-09 ENCOUNTER — PRIOR ORIGINAL RECORDS (OUTPATIENT)
Dept: OTHER | Age: 58
End: 2018-01-09

## 2018-05-03 ENCOUNTER — MYAURORA ACCOUNT LINK (OUTPATIENT)
Dept: OTHER | Age: 58
End: 2018-05-03

## 2018-05-03 ENCOUNTER — PRIOR ORIGINAL RECORDS (OUTPATIENT)
Dept: OTHER | Age: 58
End: 2018-05-03

## 2018-05-15 ENCOUNTER — TELEPHONE (OUTPATIENT)
Dept: CARDIOLOGY CLINIC | Facility: CLINIC | Age: 58
End: 2018-05-15

## 2018-05-15 RX ORDER — RIVAROXABAN 20 MG/1
TABLET, FILM COATED ORAL
Qty: 90 TABLET | Refills: 1 | Status: SHIPPED | OUTPATIENT
Start: 2018-05-15 | End: 2018-11-17 | Stop reason: ALTCHOICE

## 2018-05-15 NOTE — TELEPHONE ENCOUNTER
LOV: 12/7/17 Last Rx: 11/6/17    No protocol     Please advise in regards to refill request. Thank You

## 2018-05-17 RX ORDER — LOSARTAN POTASSIUM 50 MG/1
50 TABLET ORAL NIGHTLY
Qty: 30 TABLET | Refills: 4 | Status: SHIPPED | OUTPATIENT
Start: 2018-05-17 | End: 2021-11-04 | Stop reason: ALTCHOICE

## 2018-06-19 ENCOUNTER — LAB ENCOUNTER (OUTPATIENT)
Dept: LAB | Facility: HOSPITAL | Age: 58
End: 2018-06-19
Attending: INTERNAL MEDICINE
Payer: COMMERCIAL

## 2018-06-19 ENCOUNTER — OFFICE VISIT (OUTPATIENT)
Dept: INTERNAL MEDICINE CLINIC | Facility: CLINIC | Age: 58
End: 2018-06-19

## 2018-06-19 VITALS
BODY MASS INDEX: 25.01 KG/M2 | TEMPERATURE: 98 F | DIASTOLIC BLOOD PRESSURE: 80 MMHG | WEIGHT: 141.13 LBS | RESPIRATION RATE: 18 BRPM | HEIGHT: 63 IN | HEART RATE: 76 BPM | SYSTOLIC BLOOD PRESSURE: 116 MMHG

## 2018-06-19 DIAGNOSIS — Z00.00 ROUTINE PHYSICAL EXAMINATION: ICD-10-CM

## 2018-06-19 DIAGNOSIS — Z00.00 ROUTINE PHYSICAL EXAMINATION: Primary | ICD-10-CM

## 2018-06-19 DIAGNOSIS — R51.9 HEADACHE IN BACK OF HEAD: ICD-10-CM

## 2018-06-19 DIAGNOSIS — I48.91 ATRIAL FIBRILLATION, UNSPECIFIED TYPE (HCC): ICD-10-CM

## 2018-06-19 DIAGNOSIS — I42.0 DILATED CARDIOMYOPATHY (HCC): ICD-10-CM

## 2018-06-19 PROCEDURE — 80053 COMPREHEN METABOLIC PANEL: CPT

## 2018-06-19 PROCEDURE — 84443 ASSAY THYROID STIM HORMONE: CPT

## 2018-06-19 PROCEDURE — 99396 PREV VISIT EST AGE 40-64: CPT | Performed by: INTERNAL MEDICINE

## 2018-06-19 PROCEDURE — 80061 LIPID PANEL: CPT

## 2018-06-19 PROCEDURE — 82607 VITAMIN B-12: CPT

## 2018-06-19 PROCEDURE — 36415 COLL VENOUS BLD VENIPUNCTURE: CPT

## 2018-06-19 PROCEDURE — 85025 COMPLETE CBC W/AUTO DIFF WBC: CPT

## 2018-06-19 RX ORDER — ASCORBIC ACID 500 MG
500 TABLET ORAL
COMMUNITY
End: 2018-06-19 | Stop reason: ALTCHOICE

## 2018-06-19 NOTE — PROGRESS NOTES
HPI:    Patient ID: Latia Leon is a 66year old female.     HPI      Patient Active Problem List:     Atrial fibrillation ([address]     Dilated cardiomyopathy ([address]     Hypercholesteremia     Otosclerosis of left ear      HISTORY:  Past Medical History:   D Nasal route 3 (three) times daily as needed for Rhinitis.  Disp: 1 Bottle Rfl: 6     Allergies:  Pcn [Penicillins]       UNKNOWN, SHORTNESS OF BREATH    Comment:DYSPNEA             Since infancy             Couldn't breathe  Lisinopril              SHORTNES mammograms. We discussed the pros and cons of that. We will refer her to gynecologist for breast exam, pelvic, mammogram but do that next year and not this year. Also discussed importance of colon cancer screening. She has never had that done.   Advised

## 2018-06-26 ENCOUNTER — TELEPHONE (OUTPATIENT)
Dept: INTERNAL MEDICINE CLINIC | Facility: CLINIC | Age: 58
End: 2018-06-26

## 2018-06-26 NOTE — TELEPHONE ENCOUNTER
Reviewed results below with pt and pt verb understanding.                               he blood cell count is normal. There is no evidence of anemia or infection.     The blood sugar (glucose) level is normal. There is no evidence of diabetes.     The elec

## 2018-06-27 ENCOUNTER — PRIOR ORIGINAL RECORDS (OUTPATIENT)
Dept: OTHER | Age: 58
End: 2018-06-27

## 2018-06-27 NOTE — TELEPHONE ENCOUNTER
Please contact the pharmacist and find out if there is anything else in the category of the new oral anticoagulants that would be covered in place of the Xarelto. Other options would include Eliquis or Pradaxa. If these are not covered, then we may need to switch over to Coumadin. Please let me know if the pharmacy has any insight into pricing and coverage of these new oral anticoagulants.

## 2018-06-27 NOTE — TELEPHONE ENCOUNTER
I spoke with patient regarding her anticoagulation. She currently has about a month worth of the Xarelto. She is waiting on feedback from the cardiologist.  We discussed going on Coumadin as a possible option that would be less expensive but also less convenient and predictable. She really does not want to go on Coumadin at this time. Discussed the pros and cons. We will continue the Xarelto for now. Await input from cardiology doctor. Results of recent blood test reviewed with patient. We will not start any new medications.

## 2018-06-27 NOTE — TELEPHONE ENCOUNTER
Please note/advise. Thank you.     [facility] pharmacist (Mayda Hilton) was contacted and he verified that the payment for the pt will be $215.00 and that the coupon used previously was a \"one time\" deal.

## 2018-06-27 NOTE — TELEPHONE ENCOUNTER
Please advise. Please reply to pool: EM [facility] pharmacist (Salima Bear) contacted and inquired about possible alternate anticoagulation Rxs and he states that the other Rx suggested would not be covered because they are also brand names. He suggested Coumadin as the next possible choice.

## 2018-07-02 ENCOUNTER — PRIOR ORIGINAL RECORDS (OUTPATIENT)
Dept: OTHER | Age: 58
End: 2018-07-02

## 2018-07-27 ENCOUNTER — PRIOR ORIGINAL RECORDS (OUTPATIENT)
Dept: OTHER | Age: 58
End: 2018-07-27

## 2018-08-29 RX ORDER — LOSARTAN POTASSIUM 50 MG/1
TABLET ORAL
Qty: 90 TABLET | Refills: 4 | OUTPATIENT
Start: 2018-08-29

## 2018-08-29 NOTE — TELEPHONE ENCOUNTER
Re: losartan reviewed LOV patient followed up at [facility]. Refill denied.

## 2018-10-24 ENCOUNTER — TELEPHONE (OUTPATIENT)
Dept: INTERNAL MEDICINE CLINIC | Facility: CLINIC | Age: 58
End: 2018-10-24

## 2018-10-24 NOTE — TELEPHONE ENCOUNTER
Pt is having surgery on 11/28 and has appt for 11/17 for her pre-op physical with Dr. Evelina Metzger is requesting lab orders to be placed on chart for pt to have done before appt on 11/17      Please advise

## 2018-10-25 NOTE — TELEPHONE ENCOUNTER
PLEASE NOTE REASON FOR CALL AND ADVISE. PATIENT SCHEDULED FOR SURGERY ON 11/28/18 WITH DR WHEELER FOR LEFT STAPEDECTOMY. CBC. CMP, EKG ORDERED IN EMR, ANY ADDITIONAL?

## 2018-11-06 NOTE — TELEPHONE ENCOUNTER
Pt is at the pharmacy now   For a refill on the following medication     Current Outpatient Medications:     •  Losartan Potassium 50 MG Oral Tab, Take 1 tablet (50 mg total) by mouth nightly., Disp: 30 tablet, Rfl: 4

## 2018-11-08 RX ORDER — LOSARTAN POTASSIUM 50 MG/1
TABLET ORAL
Qty: 30 TABLET | Refills: 0 | OUTPATIENT
Start: 2018-11-08

## 2018-11-08 NOTE — TELEPHONE ENCOUNTER
Patient not seen at [facility] cardi[facility] clinic. Seen at [facility]. Refill denied and message sent to pharmacy. See reasoning below:    Patient cancelled follow up appointment requested after 9/2017 OV.  Seen at [facility] 11/2017 w/AG and 5/2018 w/AG\    Further revie

## 2018-11-09 ENCOUNTER — PRIOR ORIGINAL RECORDS (OUTPATIENT)
Dept: OTHER | Age: 58
End: 2018-11-09

## 2018-11-09 ENCOUNTER — LAB ENCOUNTER (OUTPATIENT)
Dept: LAB | Facility: HOSPITAL | Age: 58
End: 2018-11-09
Attending: OTOLARYNGOLOGY
Payer: COMMERCIAL

## 2018-11-09 ENCOUNTER — MYAURORA ACCOUNT LINK (OUTPATIENT)
Dept: OTHER | Age: 58
End: 2018-11-09

## 2018-11-09 DIAGNOSIS — H91.8X2 OTHER SPECIFIED HEARING LOSS OF LEFT EAR, UNSPECIFIED HEARING STATUS ON CONTRALATERAL SIDE: ICD-10-CM

## 2018-11-09 DIAGNOSIS — H80.92 OTOSCLEROSIS OF LEFT EAR: ICD-10-CM

## 2018-11-09 PROCEDURE — 36415 COLL VENOUS BLD VENIPUNCTURE: CPT

## 2018-11-09 PROCEDURE — 85025 COMPLETE CBC W/AUTO DIFF WBC: CPT

## 2018-11-09 PROCEDURE — 80048 BASIC METABOLIC PNL TOTAL CA: CPT

## 2018-11-17 ENCOUNTER — OFFICE VISIT (OUTPATIENT)
Dept: INTERNAL MEDICINE CLINIC | Facility: CLINIC | Age: 58
End: 2018-11-17
Payer: COMMERCIAL

## 2018-11-17 VITALS
DIASTOLIC BLOOD PRESSURE: 78 MMHG | HEART RATE: 84 BPM | WEIGHT: 138 LBS | BODY MASS INDEX: 24.15 KG/M2 | SYSTOLIC BLOOD PRESSURE: 114 MMHG | HEIGHT: 63.5 IN | RESPIRATION RATE: 20 BRPM | TEMPERATURE: 98 F

## 2018-11-17 DIAGNOSIS — I42.0 DILATED CARDIOMYOPATHY (HCC): ICD-10-CM

## 2018-11-17 DIAGNOSIS — Z01.818 PRE-OP EXAMINATION: Primary | ICD-10-CM

## 2018-11-17 DIAGNOSIS — I48.91 ATRIAL FIBRILLATION, UNSPECIFIED TYPE (HCC): ICD-10-CM

## 2018-11-17 PROCEDURE — 99212 OFFICE O/P EST SF 10 MIN: CPT | Performed by: INTERNAL MEDICINE

## 2018-11-17 PROCEDURE — 99214 OFFICE O/P EST MOD 30 MIN: CPT | Performed by: INTERNAL MEDICINE

## 2018-11-17 RX ORDER — APIXABAN 5 MG/1
TABLET, FILM COATED ORAL
Refills: 5 | COMMUNITY
Start: 2018-11-05

## 2018-11-17 NOTE — PROGRESS NOTES
HPI:    Patient ID: Pablo Philip is a 66year old female. HPI  Patient is here for preoperative evaluation. We last saw her in the office in June for full physical exam and complete blood work. Everything was normal at that time.   She is scheduled o Negative for visual disturbance. Respiratory: Negative for cough and shortness of breath. Cardiovascular: Negative for chest pain and palpitations. Gastrointestinal: Negative for nausea, vomiting, abdominal pain, diarrhea and constipation.    Genitou present. Pulmonary/Chest: Effort normal and breath sounds normal. She has no wheezes. She has no rales. Abdominal: Soft. Bowel sounds are normal. She exhibits no mass. There is no tenderness. Musculoskeletal: She exhibits no tenderness.    Lizeth Aponte

## 2019-02-28 VITALS
SYSTOLIC BLOOD PRESSURE: 116 MMHG | WEIGHT: 143 LBS | HEIGHT: 64 IN | HEART RATE: 80 BPM | DIASTOLIC BLOOD PRESSURE: 78 MMHG | BODY MASS INDEX: 24.41 KG/M2

## 2019-02-28 VITALS
BODY MASS INDEX: 23.73 KG/M2 | HEART RATE: 80 BPM | WEIGHT: 139 LBS | HEIGHT: 64 IN | SYSTOLIC BLOOD PRESSURE: 116 MMHG | DIASTOLIC BLOOD PRESSURE: 68 MMHG

## 2019-02-28 VITALS
OXYGEN SATURATION: 96 % | HEIGHT: 64 IN | WEIGHT: 134 LBS | HEART RATE: 88 BPM | BODY MASS INDEX: 22.88 KG/M2 | DIASTOLIC BLOOD PRESSURE: 70 MMHG | SYSTOLIC BLOOD PRESSURE: 102 MMHG

## 2019-02-28 VITALS
DIASTOLIC BLOOD PRESSURE: 72 MMHG | HEART RATE: 83 BPM | WEIGHT: 132 LBS | SYSTOLIC BLOOD PRESSURE: 116 MMHG | BODY MASS INDEX: 22.53 KG/M2 | HEIGHT: 64 IN

## 2019-03-18 ENCOUNTER — TELEPHONE (OUTPATIENT)
Dept: SCHEDULING | Age: 59
End: 2019-03-18

## 2019-03-25 RX ORDER — METOPROLOL SUCCINATE 50 MG/1
TABLET, EXTENDED RELEASE ORAL
COMMUNITY
Start: 2018-11-23 | End: 2019-05-10 | Stop reason: SDUPTHER

## 2019-03-25 RX ORDER — LOSARTAN POTASSIUM 50 MG/1
TABLET ORAL
COMMUNITY
Start: 2017-10-06 | End: 2019-05-10 | Stop reason: SDUPTHER

## 2019-03-28 ENCOUNTER — TELEPHONE (OUTPATIENT)
Dept: SCHEDULING | Age: 59
End: 2019-03-28

## 2019-05-07 PROBLEM — I42.0: Status: ACTIVE | Noted: 2019-05-07

## 2019-05-07 PROBLEM — I48.19 ATRIAL FIBRILLATION, PERSISTENT (CMD): Status: ACTIVE | Noted: 2019-05-07

## 2019-05-07 RX ORDER — DIAZEPAM 5 MG/1
5 TABLET ORAL PRN
COMMUNITY
Start: 2018-11-28 | End: 2019-11-14

## 2019-05-07 RX ORDER — ONDANSETRON 4 MG/1
4 TABLET, FILM COATED ORAL PRN
COMMUNITY
Start: 2018-11-28 | End: 2019-11-14

## 2019-05-07 RX ORDER — ASCORBIC ACID 500 MG
500 TABLET ORAL DAILY
COMMUNITY
End: 2019-11-14

## 2019-05-09 ENCOUNTER — APPOINTMENT (OUTPATIENT)
Dept: CARDIOLOGY | Age: 59
End: 2019-05-09

## 2019-05-10 ENCOUNTER — TELEPHONE (OUTPATIENT)
Dept: CARDIOLOGY | Age: 59
End: 2019-05-10

## 2019-05-10 RX ORDER — LOSARTAN POTASSIUM 50 MG/1
50 TABLET ORAL DAILY
Status: SHIPPED | COMMUNITY
Start: 2019-05-10 | End: 2019-05-24 | Stop reason: SDUPTHER

## 2019-05-10 RX ORDER — METOPROLOL SUCCINATE 50 MG/1
50 TABLET, EXTENDED RELEASE ORAL
Status: SHIPPED | COMMUNITY
Start: 2019-05-10 | End: 2019-07-09 | Stop reason: SDUPTHER

## 2019-05-14 ENCOUNTER — OFFICE VISIT (OUTPATIENT)
Dept: FAMILY MEDICINE | Age: 59
End: 2019-05-14

## 2019-05-14 VITALS
RESPIRATION RATE: 17 BRPM | HEART RATE: 71 BPM | HEIGHT: 63 IN | BODY MASS INDEX: 25.37 KG/M2 | DIASTOLIC BLOOD PRESSURE: 69 MMHG | SYSTOLIC BLOOD PRESSURE: 106 MMHG | TEMPERATURE: 98.4 F | WEIGHT: 143.19 LBS | OXYGEN SATURATION: 98 %

## 2019-05-14 DIAGNOSIS — Z12.39 BREAST CANCER SCREENING: ICD-10-CM

## 2019-05-14 DIAGNOSIS — Z01.419 WELL WOMAN EXAM: Primary | ICD-10-CM

## 2019-05-14 DIAGNOSIS — I42.0 DILATED CARDIOMYOPATHY (CMD): ICD-10-CM

## 2019-05-14 DIAGNOSIS — Z12.4 CERVICAL CANCER SCREENING: ICD-10-CM

## 2019-05-14 DIAGNOSIS — M54.2 NECK PAIN: ICD-10-CM

## 2019-05-14 PROCEDURE — 84443 ASSAY THYROID STIM HORMONE: CPT | Performed by: FAMILY MEDICINE

## 2019-05-14 PROCEDURE — 80053 COMPREHEN METABOLIC PANEL: CPT | Performed by: FAMILY MEDICINE

## 2019-05-14 PROCEDURE — 99386 PREV VISIT NEW AGE 40-64: CPT | Performed by: FAMILY MEDICINE

## 2019-05-14 PROCEDURE — 80061 LIPID PANEL: CPT | Performed by: FAMILY MEDICINE

## 2019-05-14 PROCEDURE — 85025 COMPLETE CBC W/AUTO DIFF WBC: CPT | Performed by: FAMILY MEDICINE

## 2019-05-14 PROCEDURE — 83036 HEMOGLOBIN GLYCOSYLATED A1C: CPT | Performed by: FAMILY MEDICINE

## 2019-05-14 PROCEDURE — 82652 VIT D 1 25-DIHYDROXY: CPT | Performed by: FAMILY MEDICINE

## 2019-05-14 ASSESSMENT — ENCOUNTER SYMPTOMS
FATIGUE: 0
WHEEZING: 0
TROUBLE SWALLOWING: 0
SORE THROAT: 0
PSYCHIATRIC NEGATIVE: 1
NAUSEA: 0
PHOTOPHOBIA: 0
WOUND: 0
BLOOD IN STOOL: 0
DIZZINESS: 0
SHORTNESS OF BREATH: 1
VOMITING: 0
HEMATOLOGIC/LYMPHATIC NEGATIVE: 1
UNEXPECTED WEIGHT CHANGE: 0
COUGH: 0
EYE DISCHARGE: 0
CHILLS: 0
DIARRHEA: 0
ALLERGIC/IMMUNOLOGIC NEGATIVE: 1
APNEA: 0
ABDOMINAL DISTENTION: 0
WEAKNESS: 0
ENDOCRINE NEGATIVE: 1
CONSTIPATION: 0

## 2019-05-14 ASSESSMENT — PATIENT HEALTH QUESTIONNAIRE - PHQ9
SUM OF ALL RESPONSES TO PHQ9 QUESTIONS 1 AND 2: 0
SUM OF ALL RESPONSES TO PHQ9 QUESTIONS 1 AND 2: 0
1. LITTLE INTEREST OR PLEASURE IN DOING THINGS: NOT AT ALL
2. FEELING DOWN, DEPRESSED OR HOPELESS: NOT AT ALL

## 2019-05-15 ENCOUNTER — E-ADVICE (OUTPATIENT)
Dept: FAMILY MEDICINE | Age: 59
End: 2019-05-15

## 2019-05-15 LAB
ALBUMIN SERPL-MCNC: 4.2 G/DL (ref 3.6–5.1)
ALBUMIN/GLOB SERPL: 1.2 {RATIO} (ref 1–2.4)
ALP SERPL-CCNC: 77 UNITS/L (ref 45–117)
ALT SERPL-CCNC: 24 UNITS/L
ANION GAP SERPL CALC-SCNC: 10 MMOL/L (ref 10–20)
AST SERPL-CCNC: 23 UNITS/L
BASOPHILS # BLD AUTO: 0 K/MCL (ref 0–0.3)
BASOPHILS NFR BLD AUTO: 0 %
BILIRUB SERPL-MCNC: 1.2 MG/DL (ref 0.2–1)
BUN SERPL-MCNC: 15 MG/DL (ref 6–20)
BUN/CREAT SERPL: 20 (ref 7–25)
CALCIUM SERPL-MCNC: 9.4 MG/DL (ref 8.4–10.2)
CHLORIDE SERPL-SCNC: 105 MMOL/L (ref 98–107)
CHOLEST SERPL-MCNC: 212 MG/DL
CHOLEST/HDLC SERPL: 3.7 {RATIO}
CO2 SERPL-SCNC: 29 MMOL/L (ref 21–32)
CREAT SERPL-MCNC: 0.75 MG/DL (ref 0.51–0.95)
DIFFERENTIAL METHOD BLD: ABNORMAL
EOSINOPHIL # BLD AUTO: 0.1 K/MCL (ref 0.1–0.5)
EOSINOPHIL NFR SPEC: 1 %
ERYTHROCYTE [DISTWIDTH] IN BLOOD: 12.6 % (ref 11–15)
FASTING STATUS PATIENT QL REPORTED: ABNORMAL HRS
GLOBULIN SER-MCNC: 3.4 G/DL (ref 2–4)
GLUCOSE SERPL-MCNC: 83 MG/DL (ref 65–99)
HBA1C MFR BLD: 5.9 % (ref 4.5–5.6)
HCT VFR BLD CALC: 46.1 % (ref 36–46.5)
HDLC SERPL-MCNC: 57 MG/DL
HGB BLD-MCNC: 14.6 G/DL (ref 12–15.5)
IMM GRANULOCYTES # BLD AUTO: 0 K/MCL (ref 0–0.2)
IMM GRANULOCYTES NFR BLD: 0 %
LDLC SERPL-MCNC: 139 MG/DL
LENGTH OF FAST TIME PATIENT: ABNORMAL HRS
LYMPHOCYTES # BLD MANUAL: 2.6 K/MCL (ref 1–4)
LYMPHOCYTES NFR BLD MANUAL: 37 %
MCH RBC QN AUTO: 29.9 PG (ref 26–34)
MCHC RBC AUTO-ENTMCNC: 31.7 G/DL (ref 32–36.5)
MCV RBC AUTO: 94.5 FL (ref 78–100)
MONOCYTES # BLD MANUAL: 0.4 K/MCL (ref 0.3–0.9)
MONOCYTES NFR BLD MANUAL: 6 %
NEUTROPHILS # BLD: 3.8 K/MCL (ref 1.8–7.7)
NEUTROPHILS NFR BLD AUTO: 56 %
NONHDLC SERPL-MCNC: 155 MG/DL
NRBC BLD MANUAL-RTO: 0 /100 WBC
PLATELET # BLD: 229 K/MCL (ref 140–450)
POTASSIUM SERPL-SCNC: 4.4 MMOL/L (ref 3.4–5.1)
PROT SERPL-MCNC: 7.6 G/DL (ref 6.4–8.2)
RBC # BLD: 4.88 MIL/MCL (ref 4–5.2)
SODIUM SERPL-SCNC: 140 MMOL/L (ref 135–145)
TRIGL SERPL-MCNC: 80 MG/DL
TSH SERPL-ACNC: 2.33 MCUNITS/ML (ref 0.35–5)
WBC # BLD: 7 K/MCL (ref 4.2–11)

## 2019-05-16 LAB — 1,25(OH)2D SERPL-MCNC: 52.7 PG/ML (ref 19.9–79.3)

## 2019-05-17 ENCOUNTER — TELEPHONE (OUTPATIENT)
Dept: FAMILY MEDICINE | Age: 59
End: 2019-05-17

## 2019-05-21 LAB — HPV16+18+45 E6+E7MRNA CVX NAA+PROBE: NORMAL

## 2019-05-22 DIAGNOSIS — M54.9 CHRONIC BACK PAIN, UNSPECIFIED BACK LOCATION, UNSPECIFIED BACK PAIN LATERALITY: ICD-10-CM

## 2019-05-22 DIAGNOSIS — M54.2 CHRONIC NECK PAIN: Primary | ICD-10-CM

## 2019-05-22 DIAGNOSIS — G89.29 CHRONIC BACK PAIN, UNSPECIFIED BACK LOCATION, UNSPECIFIED BACK PAIN LATERALITY: ICD-10-CM

## 2019-05-22 DIAGNOSIS — G89.29 CHRONIC NECK PAIN: Primary | ICD-10-CM

## 2019-05-23 ENCOUNTER — OFFICE VISIT (OUTPATIENT)
Dept: CARDIOLOGY | Age: 59
End: 2019-05-23

## 2019-05-23 VITALS
HEIGHT: 63 IN | HEART RATE: 66 BPM | SYSTOLIC BLOOD PRESSURE: 110 MMHG | WEIGHT: 140 LBS | BODY MASS INDEX: 24.8 KG/M2 | DIASTOLIC BLOOD PRESSURE: 60 MMHG | OXYGEN SATURATION: 97 %

## 2019-05-23 DIAGNOSIS — I50.22 CHRONIC SYSTOLIC CONGESTIVE HEART FAILURE (CMD): Primary | ICD-10-CM

## 2019-05-23 DIAGNOSIS — I48.20 ATRIAL FIBRILLATION, CHRONIC (CMD): ICD-10-CM

## 2019-05-23 DIAGNOSIS — I42.0 PRIMARY DILATED CARDIOMYOPATHY (CMD): ICD-10-CM

## 2019-05-23 DIAGNOSIS — Z79.01 LONG TERM CURRENT USE OF ANTICOAGULANT: ICD-10-CM

## 2019-05-23 PROCEDURE — 99214 OFFICE O/P EST MOD 30 MIN: CPT | Performed by: INTERNAL MEDICINE

## 2019-05-24 ENCOUNTER — E-ADVICE (OUTPATIENT)
Dept: FAMILY MEDICINE | Age: 59
End: 2019-05-24

## 2019-05-24 DIAGNOSIS — I48.20 ATRIAL FIBRILLATION, CHRONIC (CMD): ICD-10-CM

## 2019-05-24 DIAGNOSIS — I50.22 CHRONIC SYSTOLIC CONGESTIVE HEART FAILURE (CMD): ICD-10-CM

## 2019-05-24 PROCEDURE — 93000 ELECTROCARDIOGRAM COMPLETE: CPT | Performed by: INTERNAL MEDICINE

## 2019-05-24 RX ORDER — LOSARTAN POTASSIUM 50 MG/1
50 TABLET ORAL DAILY
Qty: 30 TABLET | Refills: 2 | Status: SHIPPED | OUTPATIENT
Start: 2019-05-24 | End: 2019-09-30 | Stop reason: SDUPTHER

## 2019-05-28 ENCOUNTER — E-ADVICE (OUTPATIENT)
Dept: FAMILY MEDICINE | Age: 59
End: 2019-05-28

## 2019-05-28 DIAGNOSIS — Z11.59 NEED FOR HEPATITIS C SCREENING TEST: Primary | ICD-10-CM

## 2019-06-03 ENCOUNTER — E-ADVICE (OUTPATIENT)
Dept: FAMILY MEDICINE | Age: 59
End: 2019-06-03

## 2019-06-03 ENCOUNTER — E-ADVICE (OUTPATIENT)
Dept: CARDIOLOGY | Age: 59
End: 2019-06-03

## 2019-06-06 ENCOUNTER — HOSPITAL (OUTPATIENT)
Dept: OTHER | Age: 59
End: 2019-06-06
Attending: FAMILY MEDICINE

## 2019-06-06 ENCOUNTER — TELEPHONE (OUTPATIENT)
Dept: SPORTS MEDICINE | Age: 59
End: 2019-06-06

## 2019-06-06 PROCEDURE — 93306 TTE W/DOPPLER COMPLETE: CPT | Performed by: INTERNAL MEDICINE

## 2019-06-07 ENCOUNTER — HOSPITAL (OUTPATIENT)
Dept: OTHER | Age: 59
End: 2019-06-07
Attending: FAMILY MEDICINE

## 2019-06-07 DIAGNOSIS — I50.22 CHRONIC SYSTOLIC CONGESTIVE HEART FAILURE (CMD): ICD-10-CM

## 2019-06-07 DIAGNOSIS — I48.20 ATRIAL FIBRILLATION, CHRONIC (CMD): ICD-10-CM

## 2019-06-11 ENCOUNTER — TELEPHONE (OUTPATIENT)
Dept: CARDIOLOGY | Age: 59
End: 2019-06-11

## 2019-06-18 ENCOUNTER — LAB SERVICES (OUTPATIENT)
Dept: FAMILY MEDICINE | Age: 59
End: 2019-06-18

## 2019-06-18 DIAGNOSIS — Z11.59 NEED FOR HEPATITIS C SCREENING TEST: ICD-10-CM

## 2019-06-18 PROCEDURE — 36415 COLL VENOUS BLD VENIPUNCTURE: CPT

## 2019-06-18 PROCEDURE — 86803 HEPATITIS C AB TEST: CPT | Performed by: FAMILY MEDICINE

## 2019-06-18 RX ORDER — ESCITALOPRAM OXALATE 10 MG/1
10 TABLET ORAL DAILY
Qty: 30 TABLET | Refills: 1 | Status: SHIPPED | OUTPATIENT
Start: 2019-06-18 | End: 2019-06-18 | Stop reason: SDUPTHER

## 2019-06-18 RX ORDER — ESCITALOPRAM OXALATE 10 MG/1
10 TABLET ORAL DAILY
Qty: 90 TABLET | Refills: 1 | Status: SHIPPED | OUTPATIENT
Start: 2019-06-18 | End: 2019-11-14

## 2019-06-19 LAB — HCV AB SER QL: NEGATIVE

## 2019-07-01 ENCOUNTER — HOSPITAL (OUTPATIENT)
Dept: OTHER | Age: 59
End: 2019-07-01
Attending: FAMILY MEDICINE

## 2019-07-09 ENCOUNTER — E-ADVICE (OUTPATIENT)
Dept: FAMILY MEDICINE | Age: 59
End: 2019-07-09

## 2019-07-09 ENCOUNTER — E-ADVICE (OUTPATIENT)
Dept: CARDIOLOGY | Age: 59
End: 2019-07-09

## 2019-07-09 RX ORDER — METOPROLOL SUCCINATE 50 MG/1
50 TABLET, EXTENDED RELEASE ORAL DAILY
Qty: 90 TABLET | Refills: 2 | Status: SHIPPED | OUTPATIENT
Start: 2019-07-09 | End: 2019-10-07

## 2019-08-14 ENCOUNTER — E-ADVICE (OUTPATIENT)
Dept: CARDIOLOGY | Age: 59
End: 2019-08-14

## 2019-08-15 ENCOUNTER — E-ADVICE (OUTPATIENT)
Dept: CARDIOLOGY | Age: 59
End: 2019-08-15

## 2019-09-11 ENCOUNTER — HOSPITAL (OUTPATIENT)
Dept: OTHER | Age: 59
End: 2019-09-11
Attending: INTERNAL MEDICINE

## 2019-09-16 RX ORDER — APIXABAN 5 MG/1
TABLET, FILM COATED ORAL
Qty: 180 TABLET | Refills: 0 | OUTPATIENT
Start: 2019-09-16

## 2019-10-01 RX ORDER — LOSARTAN POTASSIUM 50 MG/1
50 TABLET ORAL DAILY
Qty: 30 TABLET | Refills: 4 | Status: SHIPPED | OUTPATIENT
Start: 2019-10-01 | End: 2020-01-03 | Stop reason: SDUPTHER

## 2019-10-07 RX ORDER — APIXABAN 5 MG/1
TABLET, FILM COATED ORAL
Qty: 180 TABLET | Refills: 1 | Status: SHIPPED | OUTPATIENT
Start: 2019-10-07 | End: 2019-11-14 | Stop reason: ALTCHOICE

## 2019-10-22 ENCOUNTER — E-ADVICE (OUTPATIENT)
Dept: FAMILY MEDICINE | Age: 59
End: 2019-10-22

## 2019-11-07 ENCOUNTER — E-ADVICE (OUTPATIENT)
Dept: FAMILY MEDICINE | Age: 59
End: 2019-11-07

## 2019-11-07 ENCOUNTER — E-ADVICE (OUTPATIENT)
Dept: CARDIOLOGY | Age: 59
End: 2019-11-07

## 2019-11-07 ENCOUNTER — TELEPHONE (OUTPATIENT)
Dept: CARDIOLOGY | Age: 59
End: 2019-11-07

## 2019-11-12 ENCOUNTER — LAB SERVICES (OUTPATIENT)
Dept: LAB | Age: 59
End: 2019-11-12

## 2019-11-12 DIAGNOSIS — I50.22 CHRONIC SYSTOLIC CONGESTIVE HEART FAILURE (CMD): ICD-10-CM

## 2019-11-12 DIAGNOSIS — Z79.01 LONG TERM CURRENT USE OF ANTICOAGULANT: ICD-10-CM

## 2019-11-12 DIAGNOSIS — I42.0 PRIMARY DILATED CARDIOMYOPATHY (CMD): ICD-10-CM

## 2019-11-12 DIAGNOSIS — I48.20 ATRIAL FIBRILLATION, CHRONIC (CMD): ICD-10-CM

## 2019-11-12 LAB
ALBUMIN SERPL-MCNC: 4.2 G/DL (ref 3.6–5.1)
ALBUMIN/GLOB SERPL: 1.3 {RATIO} (ref 1–2.4)
ALP SERPL-CCNC: 83 UNITS/L (ref 45–117)
ALT SERPL-CCNC: 22 UNITS/L
ANION GAP SERPL CALC-SCNC: 12 MMOL/L (ref 10–20)
AST SERPL-CCNC: 19 UNITS/L
BASOPHILS # BLD AUTO: 0 K/MCL (ref 0–0.3)
BASOPHILS NFR BLD AUTO: 0 %
BILIRUB SERPL-MCNC: 1.1 MG/DL (ref 0.2–1)
BUN SERPL-MCNC: 15 MG/DL (ref 6–20)
BUN/CREAT SERPL: 20 (ref 7–25)
CALCIUM SERPL-MCNC: 9.4 MG/DL (ref 8.4–10.2)
CHLORIDE SERPL-SCNC: 105 MMOL/L (ref 98–107)
CO2 SERPL-SCNC: 28 MMOL/L (ref 21–32)
CREAT SERPL-MCNC: 0.76 MG/DL (ref 0.51–0.95)
DIFFERENTIAL METHOD BLD: ABNORMAL
EOSINOPHIL # BLD AUTO: 0.1 K/MCL (ref 0.1–0.5)
EOSINOPHIL NFR SPEC: 1 %
ERYTHROCYTE [DISTWIDTH] IN BLOOD: 12.7 % (ref 11–15)
FASTING STATUS PATIENT QL REPORTED: 16 HRS
GLOBULIN SER-MCNC: 3.2 G/DL (ref 2–4)
GLUCOSE SERPL-MCNC: 99 MG/DL (ref 65–99)
HCT VFR BLD CALC: 47.2 % (ref 36–46.5)
HGB BLD-MCNC: 15.3 G/DL (ref 12–15.5)
IMM GRANULOCYTES # BLD AUTO: 0 K/MCL (ref 0–0.2)
IMM GRANULOCYTES NFR BLD: 0 %
LYMPHOCYTES # BLD MANUAL: 2.7 K/MCL (ref 1–4)
LYMPHOCYTES NFR BLD MANUAL: 42 %
MCH RBC QN AUTO: 30.6 PG (ref 26–34)
MCHC RBC AUTO-ENTMCNC: 32.4 G/DL (ref 32–36.5)
MCV RBC AUTO: 94.4 FL (ref 78–100)
MONOCYTES # BLD MANUAL: 0.4 K/MCL (ref 0.3–0.9)
MONOCYTES NFR BLD MANUAL: 6 %
NEUTROPHILS # BLD: 3.3 K/MCL (ref 1.8–7.7)
NEUTROPHILS NFR BLD AUTO: 51 %
NRBC BLD MANUAL-RTO: 0 /100 WBC
PLATELET # BLD: 252 K/MCL (ref 140–450)
POTASSIUM SERPL-SCNC: 4.6 MMOL/L (ref 3.4–5.1)
PROT SERPL-MCNC: 7.4 G/DL (ref 6.4–8.2)
RBC # BLD: 5 MIL/MCL (ref 4–5.2)
SODIUM SERPL-SCNC: 140 MMOL/L (ref 135–145)
WBC # BLD: 6.5 K/MCL (ref 4.2–11)

## 2019-11-12 PROCEDURE — 80053 COMPREHEN METABOLIC PANEL: CPT | Performed by: INTERNAL MEDICINE

## 2019-11-12 PROCEDURE — 36415 COLL VENOUS BLD VENIPUNCTURE: CPT | Performed by: INTERNAL MEDICINE

## 2019-11-12 PROCEDURE — 85025 COMPLETE CBC W/AUTO DIFF WBC: CPT | Performed by: INTERNAL MEDICINE

## 2019-11-14 ENCOUNTER — OFFICE VISIT (OUTPATIENT)
Dept: CARDIOLOGY | Age: 59
End: 2019-11-14

## 2019-11-14 ENCOUNTER — TELEPHONE (OUTPATIENT)
Dept: CARDIOLOGY | Age: 59
End: 2019-11-14

## 2019-11-14 ENCOUNTER — ANTI-COAG (OUTPATIENT)
Dept: CARDIOLOGY | Age: 59
End: 2019-11-14

## 2019-11-14 VITALS
DIASTOLIC BLOOD PRESSURE: 66 MMHG | OXYGEN SATURATION: 98 % | HEART RATE: 66 BPM | BODY MASS INDEX: 24.27 KG/M2 | SYSTOLIC BLOOD PRESSURE: 108 MMHG | HEIGHT: 63 IN | WEIGHT: 137 LBS

## 2019-11-14 DIAGNOSIS — I48.20 ATRIAL FIBRILLATION, CHRONIC (CMD): Primary | ICD-10-CM

## 2019-11-14 DIAGNOSIS — Z79.01 LONG TERM CURRENT USE OF ANTICOAGULANT: ICD-10-CM

## 2019-11-14 DIAGNOSIS — I48.20 ATRIAL FIBRILLATION, CHRONIC (CMD): ICD-10-CM

## 2019-11-14 PROBLEM — I42.9 CARDIOMYOPATHY (CMD): Status: RESOLVED | Noted: 2019-11-14 | Resolved: 2019-11-14

## 2019-11-14 PROBLEM — I50.22 CHRONIC SYSTOLIC CONGESTIVE HEART FAILURE (CMD): Status: RESOLVED | Noted: 2019-05-23 | Resolved: 2019-11-14

## 2019-11-14 PROBLEM — I42.0: Status: RESOLVED | Noted: 2019-05-07 | Resolved: 2019-11-14

## 2019-11-14 PROCEDURE — 99214 OFFICE O/P EST MOD 30 MIN: CPT | Performed by: INTERNAL MEDICINE

## 2019-11-14 RX ORDER — WARFARIN SODIUM 2.5 MG/1
2.5 TABLET ORAL DAILY
Qty: 90 TABLET | Refills: 4 | Status: SHIPPED | OUTPATIENT
Start: 2019-11-14 | End: 2019-11-19 | Stop reason: ALTCHOICE

## 2019-11-14 RX ORDER — METOPROLOL SUCCINATE 50 MG/1
50 TABLET, EXTENDED RELEASE ORAL DAILY
COMMUNITY
End: 2020-03-30

## 2019-11-18 ENCOUNTER — TELEPHONE (OUTPATIENT)
Dept: CARDIOLOGY | Age: 59
End: 2019-11-18

## 2019-11-19 ENCOUNTER — ANTI-COAG (OUTPATIENT)
Dept: CARDIOLOGY | Age: 59
End: 2019-11-19

## 2019-11-19 DIAGNOSIS — I48.20 ATRIAL FIBRILLATION, CHRONIC (CMD): ICD-10-CM

## 2019-11-19 DIAGNOSIS — Z79.01 LONG TERM CURRENT USE OF ANTICOAGULANT: ICD-10-CM

## 2019-11-25 ENCOUNTER — TELEPHONE (OUTPATIENT)
Dept: SCHEDULING | Age: 59
End: 2019-11-25

## 2019-11-25 ENCOUNTER — TELEPHONE (OUTPATIENT)
Dept: FAMILY MEDICINE | Age: 59
End: 2019-11-25

## 2020-01-03 RX ORDER — LOSARTAN POTASSIUM 50 MG/1
50 TABLET ORAL DAILY
Qty: 90 TABLET | Refills: 2 | Status: SHIPPED | OUTPATIENT
Start: 2020-01-03 | End: 2020-10-22

## 2020-03-30 RX ORDER — METOPROLOL SUCCINATE 50 MG/1
TABLET, EXTENDED RELEASE ORAL
Qty: 90 TABLET | Refills: 1 | Status: SHIPPED | OUTPATIENT
Start: 2020-03-30 | End: 2020-07-24

## 2020-06-16 ENCOUNTER — E-ADVICE (OUTPATIENT)
Dept: FAMILY MEDICINE | Age: 60
End: 2020-06-16

## 2020-06-16 ENCOUNTER — E-ADVICE (OUTPATIENT)
Dept: CARDIOLOGY | Age: 60
End: 2020-06-16

## 2020-06-16 ENCOUNTER — OFFICE VISIT (OUTPATIENT)
Dept: FAMILY MEDICINE | Age: 60
End: 2020-06-16

## 2020-06-16 VITALS
SYSTOLIC BLOOD PRESSURE: 103 MMHG | TEMPERATURE: 97.9 F | HEART RATE: 61 BPM | DIASTOLIC BLOOD PRESSURE: 67 MMHG | HEIGHT: 63 IN | RESPIRATION RATE: 16 BRPM | OXYGEN SATURATION: 98 % | BODY MASS INDEX: 24.75 KG/M2 | WEIGHT: 139.66 LBS

## 2020-06-16 DIAGNOSIS — R79.89 LOW VITAMIN D LEVEL: ICD-10-CM

## 2020-06-16 DIAGNOSIS — Z12.11 COLON CANCER SCREENING: ICD-10-CM

## 2020-06-16 DIAGNOSIS — I48.20 ATRIAL FIBRILLATION, CHRONIC (CMD): Primary | ICD-10-CM

## 2020-06-16 DIAGNOSIS — I42.0 DILATED CARDIOMYOPATHY (CMD): ICD-10-CM

## 2020-06-16 DIAGNOSIS — Z23 NEED FOR PNEUMOCOCCAL VACCINATION: ICD-10-CM

## 2020-06-16 DIAGNOSIS — H91.92 HEARING LOSS OF LEFT EAR, UNSPECIFIED HEARING LOSS TYPE: ICD-10-CM

## 2020-06-16 DIAGNOSIS — Z00.00 ANNUAL PHYSICAL EXAM: ICD-10-CM

## 2020-06-16 PROBLEM — I42.9 CARDIOMYOPATHY  (CMD): Status: ACTIVE | Noted: 2019-11-14

## 2020-06-16 PROBLEM — H80.92 OTOSCLEROSIS OF LEFT EAR: Status: ACTIVE | Noted: 2017-10-20

## 2020-06-16 PROCEDURE — 90471 IMMUNIZATION ADMIN: CPT

## 2020-06-16 PROCEDURE — 80061 LIPID PANEL: CPT | Performed by: FAMILY MEDICINE

## 2020-06-16 PROCEDURE — 84443 ASSAY THYROID STIM HORMONE: CPT | Performed by: FAMILY MEDICINE

## 2020-06-16 PROCEDURE — 85025 COMPLETE CBC W/AUTO DIFF WBC: CPT | Performed by: FAMILY MEDICINE

## 2020-06-16 PROCEDURE — 82652 VIT D 1 25-DIHYDROXY: CPT | Performed by: FAMILY MEDICINE

## 2020-06-16 PROCEDURE — 80053 COMPREHEN METABOLIC PANEL: CPT | Performed by: FAMILY MEDICINE

## 2020-06-16 PROCEDURE — 83036 HEMOGLOBIN GLYCOSYLATED A1C: CPT | Performed by: FAMILY MEDICINE

## 2020-06-16 PROCEDURE — 99396 PREV VISIT EST AGE 40-64: CPT | Performed by: FAMILY MEDICINE

## 2020-06-16 PROCEDURE — 90732 PPSV23 VACC 2 YRS+ SUBQ/IM: CPT

## 2020-06-16 SDOH — SOCIAL STABILITY: SOCIAL INSECURITY: WITHIN THE LAST YEAR, HAVE YOU BEEN AFRAID OF YOUR PARTNER OR EX-PARTNER?: NO

## 2020-06-16 SDOH — SOCIAL STABILITY: SOCIAL INSECURITY
WITHIN THE LAST YEAR, HAVE YOU BEEN KICKED, HIT, SLAPPED, OR OTHERWISE PHYSICALLY HURT BY YOUR PARTNER OR EX-PARTNER?: NO

## 2020-06-16 SDOH — HEALTH STABILITY: MENTAL HEALTH
STRESS IS WHEN SOMEONE FEELS TENSE, NERVOUS, ANXIOUS, OR CAN'T SLEEP AT NIGHT BECAUSE THEIR MIND IS TROUBLED. HOW STRESSED ARE YOU?: ONLY A LITTLE

## 2020-06-16 SDOH — HEALTH STABILITY: PHYSICAL HEALTH: ON AVERAGE, HOW MANY MINUTES DO YOU ENGAGE IN EXERCISE AT THIS LEVEL?: 20 MIN

## 2020-06-16 SDOH — SOCIAL STABILITY: SOCIAL INSECURITY: WITHIN THE LAST YEAR, HAVE YOU BEEN HUMILIATED OR EMOTIONALLY ABUSED IN OTHER WAYS BY YOUR PARTNER OR EX-PARTNER?: NO

## 2020-06-16 SDOH — SOCIAL STABILITY: SOCIAL INSECURITY
WITHIN THE LAST YEAR, HAVE TO BEEN RAPED OR FORCED TO HAVE ANY KIND OF SEXUAL ACTIVITY BY YOUR PARTNER OR EX-PARTNER?: NO

## 2020-06-16 SDOH — HEALTH STABILITY: PHYSICAL HEALTH: ON AVERAGE, HOW MANY DAYS PER WEEK DO YOU ENGAGE IN MODERATE TO STRENUOUS EXERCISE (LIKE A BRISK WALK)?: 3 DAYS

## 2020-06-16 ASSESSMENT — ENCOUNTER SYMPTOMS
VOMITING: 0
WHEEZING: 0
COUGH: 0
SORE THROAT: 0
HEADACHES: 0
ENDOCRINE NEGATIVE: 1
FATIGUE: 0
WOUND: 0
LIGHT-HEADEDNESS: 0
ABDOMINAL PAIN: 0
CONSTIPATION: 0
DIARRHEA: 0
SHORTNESS OF BREATH: 0
NAUSEA: 0
CHEST TIGHTNESS: 0
TROUBLE SWALLOWING: 0
PSYCHIATRIC NEGATIVE: 1
EYE DISCHARGE: 0
CHILLS: 0
BLOOD IN STOOL: 0
DIZZINESS: 0
FEVER: 0
PHOTOPHOBIA: 0
ALLERGIC/IMMUNOLOGIC NEGATIVE: 1

## 2020-06-16 ASSESSMENT — PATIENT HEALTH QUESTIONNAIRE - PHQ9
CLINICAL INTERPRETATION OF PHQ9 SCORE: NO FURTHER SCREENING NEEDED
SUM OF ALL RESPONSES TO PHQ9 QUESTIONS 1 AND 2: 0
2. FEELING DOWN, DEPRESSED OR HOPELESS: NOT AT ALL
SUM OF ALL RESPONSES TO PHQ9 QUESTIONS 1 AND 2: 0
1. LITTLE INTEREST OR PLEASURE IN DOING THINGS: NOT AT ALL
CLINICAL INTERPRETATION OF PHQ2 SCORE: NO FURTHER SCREENING NEEDED

## 2020-06-17 LAB
ALBUMIN SERPL-MCNC: 3.9 G/DL (ref 3.6–5.1)
ALBUMIN/GLOB SERPL: 1.1 {RATIO} (ref 1–2.4)
ALP SERPL-CCNC: 71 UNITS/L (ref 45–117)
ALT SERPL-CCNC: 26 UNITS/L
ANION GAP SERPL CALC-SCNC: 14 MMOL/L (ref 10–20)
AST SERPL-CCNC: 20 UNITS/L
BASOPHILS # BLD: 0 K/MCL (ref 0–0.3)
BASOPHILS NFR BLD: 1 %
BILIRUB SERPL-MCNC: 1.3 MG/DL (ref 0.2–1)
BUN SERPL-MCNC: 12 MG/DL (ref 6–20)
BUN/CREAT SERPL: 13 (ref 7–25)
CALCIUM SERPL-MCNC: 9.4 MG/DL (ref 8.4–10.2)
CHLORIDE SERPL-SCNC: 105 MMOL/L (ref 98–107)
CHOLEST SERPL-MCNC: 204 MG/DL
CHOLEST/HDLC SERPL: 3.9 {RATIO}
CO2 SERPL-SCNC: 26 MMOL/L (ref 21–32)
CREAT SERPL-MCNC: 0.96 MG/DL (ref 0.51–0.95)
DIFFERENTIAL METHOD BLD: ABNORMAL
EOSINOPHIL # BLD: 0.1 K/MCL (ref 0.1–0.5)
EOSINOPHIL NFR BLD: 2 %
ERYTHROCYTE [DISTWIDTH] IN BLOOD: 13 % (ref 11–15)
GLOBULIN SER-MCNC: 3.4 G/DL (ref 2–4)
GLUCOSE SERPL-MCNC: 108 MG/DL (ref 65–99)
HBA1C MFR BLD: 6 % (ref 4.5–5.6)
HCT VFR BLD CALC: 44.3 % (ref 36–46.5)
HDLC SERPL-MCNC: 52 MG/DL
HGB BLD-MCNC: 13.9 G/DL (ref 12–15.5)
IMM GRANULOCYTES # BLD AUTO: 0 K/MCL (ref 0–0.2)
IMM GRANULOCYTES NFR BLD: 0 %
LDLC SERPL CALC-MCNC: 134 MG/DL
LENGTH OF FAST TIME PATIENT: ABNORMAL HRS
LENGTH OF FAST TIME PATIENT: ABNORMAL HRS
LYMPHOCYTES # BLD: 2 K/MCL (ref 1–4)
LYMPHOCYTES NFR BLD: 37 %
MCH RBC QN AUTO: 30.1 PG (ref 26–34)
MCHC RBC AUTO-ENTMCNC: 31.4 G/DL (ref 32–36.5)
MCV RBC AUTO: 95.9 FL (ref 78–100)
MONOCYTES # BLD: 0.4 K/MCL (ref 0.3–0.9)
MONOCYTES NFR BLD: 7 %
NEUTROPHILS # BLD: 3 K/MCL (ref 1.8–7.7)
NEUTROPHILS NFR BLD: 53 %
NONHDLC SERPL-MCNC: 152 MG/DL
NRBC BLD MANUAL-RTO: 0 /100 WBC
PLATELET # BLD: 237 K/MCL (ref 140–450)
POTASSIUM SERPL-SCNC: 4.4 MMOL/L (ref 3.4–5.1)
PROT SERPL-MCNC: 7.3 G/DL (ref 6.4–8.2)
RBC # BLD: 4.62 MIL/MCL (ref 4–5.2)
SODIUM SERPL-SCNC: 141 MMOL/L (ref 135–145)
TRIGL SERPL-MCNC: 91 MG/DL
TSH SERPL-ACNC: 3.45 MCUNITS/ML (ref 0.35–5)
WBC # BLD: 5.6 K/MCL (ref 4.2–11)

## 2020-06-18 LAB — VITAMIN D 1 25 DIHYDROXY (125VD): 30.7 PG/ML (ref 19.9–79.3)

## 2020-07-06 ENCOUNTER — E-ADVICE (OUTPATIENT)
Dept: FAMILY MEDICINE | Age: 60
End: 2020-07-06

## 2020-07-09 ENCOUNTER — E-ADVICE (OUTPATIENT)
Dept: FAMILY MEDICINE | Age: 60
End: 2020-07-09

## 2020-07-10 ENCOUNTER — OFFICE VISIT (OUTPATIENT)
Dept: FAMILY MEDICINE | Age: 60
End: 2020-07-10

## 2020-07-10 ENCOUNTER — E-ADVICE (OUTPATIENT)
Dept: FAMILY MEDICINE | Age: 60
End: 2020-07-10

## 2020-07-10 ENCOUNTER — TELEPHONE (OUTPATIENT)
Dept: FAMILY MEDICINE | Age: 60
End: 2020-07-10

## 2020-07-10 DIAGNOSIS — M54.50 ACUTE EXACERBATION OF CHRONIC LOW BACK PAIN: Primary | ICD-10-CM

## 2020-07-10 DIAGNOSIS — G89.29 ACUTE EXACERBATION OF CHRONIC LOW BACK PAIN: Primary | ICD-10-CM

## 2020-07-10 PROCEDURE — 99442 TELEPHONE E&M BY PHYSICIAN EST PT NOT ORIG PREV 7 DAYS 11-20 MIN: CPT | Performed by: FAMILY MEDICINE

## 2020-07-10 ASSESSMENT — ENCOUNTER SYMPTOMS
SORE THROAT: 0
EYES NEGATIVE: 1
BACK PAIN: 1
NUMBNESS: 0
RESPIRATORY NEGATIVE: 1
TROUBLE SWALLOWING: 0
FATIGUE: 0
FEVER: 0
GASTROINTESTINAL NEGATIVE: 1
CHILLS: 0
PSYCHIATRIC NEGATIVE: 1
WEAKNESS: 0

## 2020-07-10 ASSESSMENT — PAIN SCALES - GENERAL: PAINLEVEL: 9-10

## 2020-07-10 ASSESSMENT — PATIENT HEALTH QUESTIONNAIRE - PHQ9
1. LITTLE INTEREST OR PLEASURE IN DOING THINGS: NOT AT ALL
CLINICAL INTERPRETATION OF PHQ2 SCORE: NO FURTHER SCREENING NEEDED
2. FEELING DOWN, DEPRESSED OR HOPELESS: NOT AT ALL
CLINICAL INTERPRETATION OF PHQ9 SCORE: NO FURTHER SCREENING NEEDED
SUM OF ALL RESPONSES TO PHQ9 QUESTIONS 1 AND 2: 0
SUM OF ALL RESPONSES TO PHQ9 QUESTIONS 1 AND 2: 0

## 2020-07-14 ENCOUNTER — TELEPHONE (OUTPATIENT)
Dept: SCHEDULING | Age: 60
End: 2020-07-14

## 2020-07-14 ENCOUNTER — E-ADVICE (OUTPATIENT)
Dept: FAMILY MEDICINE | Age: 60
End: 2020-07-14

## 2020-07-24 RX ORDER — METOPROLOL SUCCINATE 50 MG/1
TABLET, EXTENDED RELEASE ORAL
Qty: 90 TABLET | Refills: 0 | Status: SHIPPED | OUTPATIENT
Start: 2020-07-24 | End: 2020-10-26

## 2020-08-14 ENCOUNTER — E-ADVICE (OUTPATIENT)
Dept: FAMILY MEDICINE | Age: 60
End: 2020-08-14

## 2020-08-14 DIAGNOSIS — H90.12 CONDUCTIVE HEARING LOSS OF LEFT EAR, UNSPECIFIED HEARING STATUS ON CONTRALATERAL SIDE: Primary | ICD-10-CM

## 2020-10-04 ENCOUNTER — E-ADVICE (OUTPATIENT)
Dept: FAMILY MEDICINE | Age: 60
End: 2020-10-04

## 2020-10-04 DIAGNOSIS — H53.8 BLURRY VISION: Primary | ICD-10-CM

## 2020-10-06 ENCOUNTER — E-ADVICE (OUTPATIENT)
Dept: CARDIOLOGY | Age: 60
End: 2020-10-06

## 2020-10-14 ENCOUNTER — TELEPHONE (OUTPATIENT)
Dept: FAMILY MEDICINE | Age: 60
End: 2020-10-14

## 2020-10-22 RX ORDER — LOSARTAN POTASSIUM 50 MG/1
50 TABLET ORAL DAILY
Qty: 90 TABLET | Refills: 0 | Status: SHIPPED | OUTPATIENT
Start: 2020-10-22 | End: 2020-11-18 | Stop reason: SDUPTHER

## 2020-10-26 RX ORDER — METOPROLOL SUCCINATE 50 MG/1
TABLET, EXTENDED RELEASE ORAL
Qty: 90 TABLET | Refills: 0 | Status: SHIPPED | OUTPATIENT
Start: 2020-10-26 | End: 2020-11-18 | Stop reason: SDUPTHER

## 2020-11-12 ENCOUNTER — TELEPHONIC VISIT (OUTPATIENT)
Dept: FAMILY MEDICINE | Age: 60
End: 2020-11-12

## 2020-11-12 DIAGNOSIS — R19.7 DIARRHEA OF PRESUMED INFECTIOUS ORIGIN: Primary | ICD-10-CM

## 2020-11-12 PROCEDURE — 99442 TELEPHONE E&M BY PHYSICIAN EST PT NOT ORIG PREV 7 DAYS 11-20 MIN: CPT | Performed by: FAMILY MEDICINE

## 2020-11-12 RX ORDER — FLUTICASONE PROPIONATE 50 MCG
SPRAY, SUSPENSION (ML) NASAL
COMMUNITY
Start: 2020-09-15

## 2020-11-12 ASSESSMENT — ENCOUNTER SYMPTOMS
CHEST TIGHTNESS: 0
PSYCHIATRIC NEGATIVE: 1
ABDOMINAL PAIN: 1
PHOTOPHOBIA: 0
FATIGUE: 0
DIZZINESS: 0
COUGH: 0
ENDOCRINE NEGATIVE: 1
EYE DISCHARGE: 0
TROUBLE SWALLOWING: 0
WHEEZING: 0
NAUSEA: 0
DIARRHEA: 1
FEVER: 0
HEADACHES: 0
SORE THROAT: 0
WOUND: 0
HEMATOLOGIC/LYMPHATIC NEGATIVE: 1
CHILLS: 0
VOMITING: 0
SHORTNESS OF BREATH: 0

## 2020-11-18 ENCOUNTER — OFFICE VISIT (OUTPATIENT)
Dept: CARDIOLOGY | Age: 60
End: 2020-11-18

## 2020-11-18 VITALS
BODY MASS INDEX: 24.8 KG/M2 | HEART RATE: 98 BPM | WEIGHT: 140 LBS | DIASTOLIC BLOOD PRESSURE: 80 MMHG | HEIGHT: 63 IN | SYSTOLIC BLOOD PRESSURE: 124 MMHG

## 2020-11-18 DIAGNOSIS — I48.20 ATRIAL FIBRILLATION, CHRONIC (CMD): Primary | ICD-10-CM

## 2020-11-18 DIAGNOSIS — Z79.01 LONG TERM CURRENT USE OF ANTICOAGULANT: ICD-10-CM

## 2020-11-18 PROBLEM — I42.9 CARDIOMYOPATHY (CMD): Status: RESOLVED | Noted: 2019-11-14 | Resolved: 2020-11-18

## 2020-11-18 PROCEDURE — 99214 OFFICE O/P EST MOD 30 MIN: CPT | Performed by: INTERNAL MEDICINE

## 2020-11-18 RX ORDER — METOPROLOL SUCCINATE 50 MG/1
50 TABLET, EXTENDED RELEASE ORAL DAILY
Qty: 90 TABLET | Refills: 3 | Status: SHIPPED | OUTPATIENT
Start: 2020-11-18

## 2020-11-18 RX ORDER — LOSARTAN POTASSIUM 50 MG/1
50 TABLET ORAL DAILY
Qty: 90 TABLET | Refills: 3 | Status: SHIPPED | OUTPATIENT
Start: 2020-11-18 | End: 2021-06-29 | Stop reason: DRUGHIGH

## 2020-11-18 ASSESSMENT — PATIENT HEALTH QUESTIONNAIRE - PHQ9
SUM OF ALL RESPONSES TO PHQ9 QUESTIONS 1 AND 2: 0
CLINICAL INTERPRETATION OF PHQ2 SCORE: NO FURTHER SCREENING NEEDED
CLINICAL INTERPRETATION OF PHQ9 SCORE: NO FURTHER SCREENING NEEDED
2. FEELING DOWN, DEPRESSED OR HOPELESS: NOT AT ALL
1. LITTLE INTEREST OR PLEASURE IN DOING THINGS: NOT AT ALL
SUM OF ALL RESPONSES TO PHQ9 QUESTIONS 1 AND 2: 0

## 2020-11-19 ENCOUNTER — APPOINTMENT (OUTPATIENT)
Dept: CARDIOLOGY | Age: 60
End: 2020-11-19

## 2021-04-27 ENCOUNTER — TELEPHONE (OUTPATIENT)
Dept: SCHEDULING | Age: 61
End: 2021-04-27

## 2021-05-08 ENCOUNTER — TELEPHONE (OUTPATIENT)
Dept: INTERNAL MEDICINE CLINIC | Facility: CLINIC | Age: 61
End: 2021-05-08

## 2021-05-08 DIAGNOSIS — Z12.31 BREAST CANCER SCREENING BY MAMMOGRAM: Primary | ICD-10-CM

## 2021-05-08 NOTE — TELEPHONE ENCOUNTER
Patient contacted and informed that she is due for annual mammogram screening, voiced understanding and states she will schedule appointment next week. Phone number given for [employer]. [employer] order pending in EMR, please advise.

## 2021-06-21 ENCOUNTER — TELEPHONE (OUTPATIENT)
Dept: INTERNAL MEDICINE CLINIC | Facility: CLINIC | Age: 61
End: 2021-06-21

## 2021-06-21 DIAGNOSIS — Z00.00 ROUTINE PHYSICAL EXAMINATION: Primary | ICD-10-CM

## 2021-06-21 NOTE — TELEPHONE ENCOUNTER
Left message to pt to call back.        Future Appointments   Date Time Provider Hubert Hanna   6/23/2021  9:40 AM Anil Holguin MD [place] [phone]

## 2021-06-21 NOTE — TELEPHONE ENCOUNTER
Patient is calling and asking if Dr will order her lab work prior to the appointment so she can get it done before seeing the Dr. Mariangel Curry.

## 2021-06-21 NOTE — TELEPHONE ENCOUNTER
Patient returning our call, advised that labs are in the system and she can go have blood drawn at her convenience. Patient verbalizes understanding and agrees.

## 2021-06-23 ENCOUNTER — LAB ENCOUNTER (OUTPATIENT)
Dept: LAB | Facility: HOSPITAL | Age: 61
End: 2021-06-23
Attending: INTERNAL MEDICINE
Payer: COMMERCIAL

## 2021-06-23 ENCOUNTER — OFFICE VISIT (OUTPATIENT)
Dept: INTERNAL MEDICINE CLINIC | Facility: CLINIC | Age: 61
End: 2021-06-23
Payer: COMMERCIAL

## 2021-06-23 VITALS
HEART RATE: 54 BPM | WEIGHT: 135.88 LBS | RESPIRATION RATE: 18 BRPM | HEIGHT: 64 IN | SYSTOLIC BLOOD PRESSURE: 98 MMHG | BODY MASS INDEX: 23.2 KG/M2 | TEMPERATURE: 98 F | DIASTOLIC BLOOD PRESSURE: 64 MMHG

## 2021-06-23 DIAGNOSIS — Z00.00 ROUTINE PHYSICAL EXAMINATION: ICD-10-CM

## 2021-06-23 DIAGNOSIS — I48.91 ATRIAL FIBRILLATION, UNSPECIFIED TYPE (HCC): ICD-10-CM

## 2021-06-23 DIAGNOSIS — Z00.00 ROUTINE PHYSICAL EXAMINATION: Primary | ICD-10-CM

## 2021-06-23 DIAGNOSIS — I42.0 DILATED CARDIOMYOPATHY (HCC): ICD-10-CM

## 2021-06-23 DIAGNOSIS — E78.00 HYPERCHOLESTEREMIA: ICD-10-CM

## 2021-06-23 PROCEDURE — 84443 ASSAY THYROID STIM HORMONE: CPT

## 2021-06-23 PROCEDURE — 3008F BODY MASS INDEX DOCD: CPT | Performed by: INTERNAL MEDICINE

## 2021-06-23 PROCEDURE — 80053 COMPREHEN METABOLIC PANEL: CPT

## 2021-06-23 PROCEDURE — 82306 VITAMIN D 25 HYDROXY: CPT

## 2021-06-23 PROCEDURE — 36415 COLL VENOUS BLD VENIPUNCTURE: CPT

## 2021-06-23 PROCEDURE — 3074F SYST BP LT 130 MM HG: CPT | Performed by: INTERNAL MEDICINE

## 2021-06-23 PROCEDURE — 99396 PREV VISIT EST AGE 40-64: CPT | Performed by: INTERNAL MEDICINE

## 2021-06-23 PROCEDURE — 83036 HEMOGLOBIN GLYCOSYLATED A1C: CPT

## 2021-06-23 PROCEDURE — 82607 VITAMIN B-12: CPT

## 2021-06-23 PROCEDURE — 80061 LIPID PANEL: CPT

## 2021-06-23 PROCEDURE — 85025 COMPLETE CBC W/AUTO DIFF WBC: CPT

## 2021-06-23 PROCEDURE — 3078F DIAST BP <80 MM HG: CPT | Performed by: INTERNAL MEDICINE

## 2021-06-23 RX ORDER — FLUTICASONE PROPIONATE 50 MCG
SPRAY, SUSPENSION (ML) NASAL
COMMUNITY
Start: 2020-09-15

## 2021-06-23 NOTE — PROGRESS NOTES
HPI:    Patient ID: Mable Thompson is a 63year old female. HPI  Patient is here requesting a physical exam and follow-up on chronic medical issues. Last seen here for preoperative visit in November 2018. That was for surgery for the left ear.   He is ELIQUIS 5 MG Oral Tab TK 1 T PO  BID  5   • Ipratropium Bromide 0.03 % Nasal Solution 2 sprays by Nasal route every 12 (twelve) hours. 3 Bottle 3   • Losartan Potassium 50 MG Oral Tab Take 1 tablet (50 mg total) by mouth nightly.  30 tablet 4   • Metoprolol edema. Left lower leg: No edema. Lymphadenopathy:      Cervical: No cervical adenopathy. Skin:     General: Skin is warm and dry. Findings: No rash. Neurological:      General: No focal deficit present. Mental Status: She is alert.  Men fibrillation, unspecified type [facility])  Patient appears to be back in atrial fibrillation. Is been difficult for her to stay out. Follow-up with cardiology. Issues with fatigue as above. 4. Hypercholesteremia  Check lipid profile.   Work on diet and exe

## 2021-06-28 ENCOUNTER — E-ADVICE (OUTPATIENT)
Dept: CARDIOLOGY | Age: 61
End: 2021-06-28

## 2021-06-29 ENCOUNTER — E-ADVICE (OUTPATIENT)
Dept: CARDIOLOGY | Age: 61
End: 2021-06-29

## 2021-06-29 ENCOUNTER — TELEPHONE (OUTPATIENT)
Dept: CARDIOLOGY | Age: 61
End: 2021-06-29

## 2021-06-29 RX ORDER — LOSARTAN POTASSIUM 25 MG/1
25 TABLET ORAL DAILY
Qty: 90 TABLET | Refills: 0 | Status: SHIPPED | COMMUNITY
Start: 2021-06-29 | End: 2021-06-29 | Stop reason: SDUPTHER

## 2021-06-29 RX ORDER — LOSARTAN POTASSIUM 25 MG/1
25 TABLET ORAL DAILY
Qty: 90 TABLET | Refills: 0 | Status: SHIPPED | OUTPATIENT
Start: 2021-06-29

## 2021-06-30 ENCOUNTER — E-ADVICE (OUTPATIENT)
Dept: CARDIOLOGY | Age: 61
End: 2021-06-30

## 2021-07-22 ENCOUNTER — HOSPITAL ENCOUNTER (OUTPATIENT)
Dept: MAMMOGRAPHY | Age: 61
Discharge: HOME | End: 2021-07-22
Attending: INTERNAL MEDICINE
Payer: COMMERCIAL

## 2021-07-22 DIAGNOSIS — Z12.31 BREAST CANCER SCREENING BY MAMMOGRAM: ICD-10-CM

## 2021-07-22 PROCEDURE — 77063 BREAST TOMOSYNTHESIS BI: CPT | Performed by: INTERNAL MEDICINE

## 2021-07-22 PROCEDURE — 77067 SCR MAMMO BI INCL CAD: CPT | Performed by: INTERNAL MEDICINE

## 2021-08-05 ENCOUNTER — HOSPITAL ENCOUNTER (OUTPATIENT)
Dept: ULTRASOUND IMAGING | Facility: HOSPITAL | Age: 61
Discharge: HOME | End: 2021-08-05
Attending: INTERNAL MEDICINE
Payer: COMMERCIAL

## 2021-08-05 ENCOUNTER — HOSPITAL ENCOUNTER (OUTPATIENT)
Dept: MAMMOGRAPHY | Facility: HOSPITAL | Age: 61
Discharge: HOME | End: 2021-08-05
Attending: INTERNAL MEDICINE
Payer: COMMERCIAL

## 2021-08-05 DIAGNOSIS — R92.8 ABNORMAL MAMMOGRAM: ICD-10-CM

## 2021-08-05 PROCEDURE — 77065 DX MAMMO INCL CAD UNI: CPT | Performed by: INTERNAL MEDICINE

## 2021-08-05 PROCEDURE — 77061 BREAST TOMOSYNTHESIS UNI: CPT | Performed by: INTERNAL MEDICINE

## 2021-08-05 PROCEDURE — 76642 ULTRASOUND BREAST LIMITED: CPT | Performed by: INTERNAL MEDICINE

## 2021-11-03 ENCOUNTER — NURSE TRIAGE (OUTPATIENT)
Dept: INTERNAL MEDICINE CLINIC | Facility: CLINIC | Age: 61
End: 2021-11-03

## 2021-11-03 NOTE — TELEPHONE ENCOUNTER
Action Requested: Summary for Provider     []  Critical Lab, Recommendations Needed  [] Need Additional Advice  []   FYI    []   Need Orders  [] Need Medications Sent to Pharmacy  []  Other     SUMMARY:appt made, call transferred from [facility]- Pt need appt

## 2021-11-04 ENCOUNTER — OFFICE VISIT (OUTPATIENT)
Dept: INTERNAL MEDICINE CLINIC | Facility: CLINIC | Age: 61
End: 2021-11-04
Payer: COMMERCIAL

## 2021-11-04 VITALS
TEMPERATURE: 97 F | WEIGHT: 133.19 LBS | HEIGHT: 64 IN | HEART RATE: 125 BPM | BODY MASS INDEX: 22.74 KG/M2 | SYSTOLIC BLOOD PRESSURE: 142 MMHG | DIASTOLIC BLOOD PRESSURE: 92 MMHG

## 2021-11-04 DIAGNOSIS — R13.10 DYSPHAGIA, UNSPECIFIED TYPE: ICD-10-CM

## 2021-11-04 DIAGNOSIS — I48.91 ATRIAL FIBRILLATION, UNSPECIFIED TYPE (HCC): ICD-10-CM

## 2021-11-04 DIAGNOSIS — R48.1 LOSS OF PERCEPTION FOR TEMPERATURE: Primary | ICD-10-CM

## 2021-11-04 PROCEDURE — 3077F SYST BP >= 140 MM HG: CPT | Performed by: INTERNAL MEDICINE

## 2021-11-04 PROCEDURE — 3008F BODY MASS INDEX DOCD: CPT | Performed by: INTERNAL MEDICINE

## 2021-11-04 PROCEDURE — 3080F DIAST BP >= 90 MM HG: CPT | Performed by: INTERNAL MEDICINE

## 2021-11-04 PROCEDURE — 99215 OFFICE O/P EST HI 40 MIN: CPT | Performed by: INTERNAL MEDICINE

## 2021-11-04 RX ORDER — LOSARTAN POTASSIUM 25 MG/1
25 TABLET ORAL DAILY
COMMUNITY
Start: 2021-10-19

## 2021-11-04 NOTE — PROGRESS NOTES
HPI:    Patient ID: Jacobo Hi is a 63year old female. HPI:  Last week she drove to and from [place]. She didn't eat or drink much. She had vertigo and lost vision for a few minutes.  She felt presyncopal.  That evening she still had visi chest pain and palpitations. Musculoskeletal: Positive for myalgias. Negative for gait problem. Neurological: Positive for numbness. Negative for dizziness, speech difficulty, weakness and headaches.         .BP (!) 142/92 (BP Location: Left arm)   Puls Eliquis. She could also have a brain or spinal cord lesion. We will check MRIs. - MRI BRAIN (W+WO) (CPT=70553); Future  - MRI BRAIN (W+WO) (CPT=70553)  - NEURO - INTERNAL    3. Atrial fibrillation, unspecified type [facility])  Patient takes Eliquis daily.   Harpreet Paredes

## 2021-11-05 ENCOUNTER — PATIENT MESSAGE (OUTPATIENT)
Dept: INTERNAL MEDICINE CLINIC | Facility: CLINIC | Age: 61
End: 2021-11-05

## 2021-11-05 ENCOUNTER — TELEPHONE (OUTPATIENT)
Dept: INTERNAL MEDICINE CLINIC | Facility: CLINIC | Age: 61
End: 2021-11-05

## 2021-11-05 NOTE — TELEPHONE ENCOUNTER
Please let her know that I don't work Wednesday afternoon. I have video appointments Wed am.  I am also available on Thursday, but I don't think she will be in town then. [place] in [place], I could do video on Thursday.

## 2021-11-05 NOTE — TELEPHONE ENCOUNTER
Patient is calling to request to reschedule the video appt of 11/9/21 with Dr. Valle Handsome to 11/10/21. Patient notes she received a call from PCP to reschedule her under the RES24 slot in the afternoon.   I am not able to reschedule patient as advised as I d

## 2021-11-06 NOTE — TELEPHONE ENCOUNTER
[employer] message with MD recommendation sent to pt. See TE 11-5-21    No future appointments.

## 2021-11-06 NOTE — TELEPHONE ENCOUNTER
[facility] followed [facility] via [facility]. Advised to call to schedule appt Wed, may cancel if results not available. Advised to discuss with nurse request for full body MRI.       Avel Puentes  to Julia Escudero MD     10:22 PM  I'm not sure my results f

## 2021-11-06 NOTE — TELEPHONE ENCOUNTER
RN pls call pt and triage or offer ov if needed, thanks. See mychart 11-5-21. Reason for call was  changed from appt to \"acute\".       From: Michelle Wei  To: Yuliet Gray MD  Sent: 11/5/2021  9:49 PM CDT  Subject: Appt    Unfortunately I c

## 2021-11-06 NOTE — TELEPHONE ENCOUNTER
From: Rafat Evans  To: Figueroa Hollingsworth MD  Sent: 11/5/2021 9:49 PM CDT  Subject: Appt    Unfortunately I cannot get my MRI until late Tuesday afternoon ,after our scheduled appt. Lisa Morris I need to reshcedule our video appt. for Wednesday afternoon. .. yuri

## 2021-11-10 ENCOUNTER — MED REC SCAN ONLY (OUTPATIENT)
Dept: INTERNAL MEDICINE CLINIC | Facility: CLINIC | Age: 61
End: 2021-11-10

## 2021-11-10 ENCOUNTER — TELEMEDICINE (OUTPATIENT)
Dept: INTERNAL MEDICINE CLINIC | Facility: CLINIC | Age: 61
End: 2021-11-10
Payer: COMMERCIAL

## 2021-11-10 DIAGNOSIS — R48.1 LOSS OF PERCEPTION FOR TEMPERATURE: Primary | ICD-10-CM

## 2021-11-10 DIAGNOSIS — M48.02 CERVICAL SPINAL STENOSIS: ICD-10-CM

## 2021-11-10 PROCEDURE — 99213 OFFICE O/P EST LOW 20 MIN: CPT | Performed by: INTERNAL MEDICINE

## 2021-11-10 NOTE — PROGRESS NOTES
Video Progress Note    This visit is conducted using Telemedicine with live, interactive video and audio. Patient has been referred to the [facility] website at [email or web address] to review the yearly Consent to Treat document.     Patient understands an Onset   • Heart Disorder Father         Heart valve disease   • Cancer Mother 64        ovarian   • Ovarian Cancer Mother 64   • Breast Cancer Paternal Aunt         46s       Review of Systems   Neurological: Positive for numbness.         .There were no vi

## 2021-11-30 ENCOUNTER — OFFICE VISIT (OUTPATIENT)
Dept: NEUROLOGY | Facility: CLINIC | Age: 61
End: 2021-11-30
Payer: COMMERCIAL

## 2021-11-30 VITALS
HEIGHT: 63.5 IN | HEART RATE: 80 BPM | DIASTOLIC BLOOD PRESSURE: 64 MMHG | BODY MASS INDEX: 23.27 KG/M2 | SYSTOLIC BLOOD PRESSURE: 102 MMHG | WEIGHT: 133 LBS

## 2021-11-30 DIAGNOSIS — M48.02 FORAMINAL STENOSIS OF CERVICAL REGION: ICD-10-CM

## 2021-11-30 DIAGNOSIS — R20.0 LEFT SIDED NUMBNESS: ICD-10-CM

## 2021-11-30 DIAGNOSIS — G43.109 MIGRAINE WITH AURA AND WITHOUT STATUS MIGRAINOSUS, NOT INTRACTABLE: Primary | ICD-10-CM

## 2021-11-30 PROCEDURE — 3074F SYST BP LT 130 MM HG: CPT | Performed by: OTHER

## 2021-11-30 PROCEDURE — 3008F BODY MASS INDEX DOCD: CPT | Performed by: OTHER

## 2021-11-30 PROCEDURE — 99205 OFFICE O/P NEW HI 60 MIN: CPT | Performed by: OTHER

## 2021-11-30 PROCEDURE — 3078F DIAST BP <80 MM HG: CPT | Performed by: OTHER

## 2021-11-30 RX ORDER — SUMATRIPTAN 100 MG/1
TABLET, FILM COATED ORAL
Qty: 9 TABLET | Refills: 0 | Status: SHIPPED | OUTPATIENT
Start: 2021-11-30

## 2021-11-30 RX ORDER — MAGNESIUM OXIDE 400 MG (241.3 MG MAGNESIUM) TABLET
TABLET
COMMUNITY

## 2021-11-30 RX ORDER — MAGNESIUM OXIDE 400 MG/1
400 TABLET ORAL DAILY
Qty: 90 TABLET | Refills: 3 | Status: SHIPPED | OUTPATIENT
Start: 2021-11-30 | End: 2022-11-25

## 2021-11-30 NOTE — PATIENT INSTRUCTIONS
Your symptoms are due to a combination of migraine with aura and \"pinched nerves\" or  Cervical disk disease. To prevent headaches you  need to drink at least 80 ounces a day. You need to go to physical therapy for your neck.  Take gabapentin 100 mg

## 2021-11-30 NOTE — PROGRESS NOTES
Osman Dub 39  [place] [address] [place] [address]  [phone]          Osman Dub 39  NEW PATIENT EVALUATION  Reason for Admission/Consultation:  Right sided sensory loss    Requested by:   PCP: Andrew Hill persistent sensory loss in her left arm and leg. She states that when Dr. Pierce Vallejo tested her she had sensory loss in her left arm and leg. She reports that when she was in the shower today she still had sensory loss.   She was apprised on her exam her se Heart Disorder Father         Heart valve disease   • Cancer Mother 64        ovarian   • Ovarian Cancer Mother 64   • Breast Cancer Paternal Aunt         46s       Objective:  Vitals  /64 (BP Location: Right arm, Patient Position: Sitting, Cuff Size symmetric. Asterixis: No asterixis noted. Tremor:  none      Reflexes:    C5 C6 C7  L4 S1   R 2+ 2+  2+ 2+   L 3+ 3+ 3+ 2+ 2+   Adductor Spread: No adductor spread noted. Frontal release signs:Not assessed.     Jaw Jerk:    Marium's sign: presen vertigo, and left-sided hemisensory loss of the on 10/28/2021. Her hemisensory loss has persisted. Her symptoms are due to migraine with aura. Migraine auras can symptoms persist for weeks.   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 • Maintain hydration: Visit [employer] to determine the individual water needs based on gender, weight, weather, and level of physical activity.   • Avoid factors that increase th the prior notes, reviewing any relevant and available imaging, and in direct face to face time with the patient, of which greater than 50% of the time was spent in patient education, counseling, and coordination of care as described above.    Issues discuss

## 2021-12-30 ENCOUNTER — TELEPHONE (OUTPATIENT)
Dept: CARDIOLOGY | Age: 61
End: 2021-12-30

## 2022-02-22 ENCOUNTER — LAB ENCOUNTER (OUTPATIENT)
Dept: LAB | Facility: HOSPITAL | Age: 62
End: 2022-02-22
Attending: Other
Payer: COMMERCIAL

## 2022-02-22 DIAGNOSIS — E78.00 HYPERCHOLESTEREMIA: ICD-10-CM

## 2022-02-22 DIAGNOSIS — I48.91 ATRIAL FIBRILLATION, UNSPECIFIED TYPE (HCC): ICD-10-CM

## 2022-02-22 DIAGNOSIS — R20.0 LEFT SIDED NUMBNESS: ICD-10-CM

## 2022-02-22 LAB
ALBUMIN SERPL-MCNC: 4 G/DL (ref 3.4–5)
ALBUMIN/GLOB SERPL: 1.1 {RATIO} (ref 1–2)
ALP LIVER SERPL-CCNC: 85 U/L
ALT SERPL-CCNC: 25 U/L
ANION GAP SERPL CALC-SCNC: 7 MMOL/L (ref 0–18)
AST SERPL-CCNC: 19 U/L (ref 15–37)
BASOPHILS NFR BLD AUTO: 0.3 %
BILIRUB SERPL-MCNC: 1.1 MG/DL (ref 0.1–2)
BUN BLD-MCNC: 14 MG/DL (ref 7–18)
BUN/CREAT SERPL: 15.6 (ref 10–20)
CALCIUM BLD-MCNC: 9.2 MG/DL (ref 8.5–10.1)
CHLORIDE SERPL-SCNC: 102 MMOL/L (ref 98–112)
CK SERPL-CCNC: 82 U/L
CO2 SERPL-SCNC: 28 MMOL/L (ref 21–32)
CREAT BLD-MCNC: 0.9 MG/DL
DEPRECATED RDW RBC AUTO: 45.7 FL (ref 35.1–46.3)
EOSINOPHIL # BLD AUTO: 0.1 X10(3) UL (ref 0–0.7)
EOSINOPHIL NFR BLD AUTO: 1.7 %
ERYTHROCYTE [DISTWIDTH] IN BLOOD BY AUTOMATED COUNT: 13.2 % (ref 11–15)
FASTING STATUS PATIENT QL REPORTED: YES
GLOBULIN PLAS-MCNC: 3.7 G/DL (ref 2.8–4.4)
GLUCOSE BLD-MCNC: 111 MG/DL (ref 70–99)
HCT VFR BLD AUTO: 44.2 %
HGB BLD-MCNC: 14.5 G/DL
IMM GRANULOCYTES # BLD AUTO: 0.01 X10(3) UL (ref 0–1)
IMM GRANULOCYTES NFR BLD: 0.2 %
LYMPHOCYTES # BLD AUTO: 2.3 X10(3) UL (ref 1–4)
LYMPHOCYTES NFR BLD AUTO: 40.1 %
MCH RBC QN AUTO: 30.8 PG (ref 26–34)
MCV RBC AUTO: 93.8 FL
MONOCYTES # BLD AUTO: 0.33 X10(3) UL (ref 0.1–1)
MONOCYTES NFR BLD AUTO: 5.8 %
NEUTROPHILS # BLD AUTO: 2.97 X10 (3) UL (ref 1.5–7.7)
NEUTROPHILS # BLD AUTO: 2.97 X10(3) UL (ref 1.5–7.7)
NEUTROPHILS NFR BLD AUTO: 51.9 %
OSMOLALITY SERPL CALC.SUM OF ELEC: 285 MOSM/KG (ref 275–295)
PLATELET # BLD AUTO: 234 10(3)UL (ref 150–450)
POTASSIUM SERPL-SCNC: 4.5 MMOL/L (ref 3.5–5.1)
PROT SERPL-MCNC: 7.7 G/DL (ref 6.4–8.2)
RBC # BLD AUTO: 4.71 X10(6)UL
SODIUM SERPL-SCNC: 137 MMOL/L (ref 136–145)
WBC # BLD AUTO: 5.7 X10(3) UL (ref 4–11)

## 2022-02-22 PROCEDURE — 86334 IMMUNOFIX E-PHORESIS SERUM: CPT

## 2022-02-22 PROCEDURE — 84446 ASSAY OF VITAMIN E: CPT

## 2022-02-22 PROCEDURE — 86038 ANTINUCLEAR ANTIBODIES: CPT

## 2022-02-22 PROCEDURE — 36415 COLL VENOUS BLD VENIPUNCTURE: CPT

## 2022-02-22 PROCEDURE — 84425 ASSAY OF VITAMIN B-1: CPT

## 2022-02-22 PROCEDURE — 80053 COMPREHEN METABOLIC PANEL: CPT

## 2022-02-22 PROCEDURE — 85025 COMPLETE CBC W/AUTO DIFF WBC: CPT

## 2022-02-22 PROCEDURE — 82550 ASSAY OF CK (CPK): CPT

## 2022-02-24 LAB
ALPHA-TOCOPHEROL (VIT E) -MG/L: 11 MG/L
GAMMA-TOCOPHEROL (VIT E) -MG/L: 1.9 MG/L
NUCLEAR IGG TITR SER IF: NEGATIVE {TITER}

## 2022-02-26 LAB — VITAMIN B1 (THIAMINE), WHOLE B: 136 NMOL/L

## 2022-03-01 ENCOUNTER — TELEPHONE (OUTPATIENT)
Dept: INTERNAL MEDICINE CLINIC | Facility: CLINIC | Age: 62
End: 2022-03-01

## 2022-03-01 NOTE — TELEPHONE ENCOUNTER
Please let patient know her mammogram and U.S has been ordered.     ANAM Monique  Working with Bianca Suárez

## 2022-03-01 NOTE — TELEPHONE ENCOUNTER
Pt received a letter that she is due for an 6 month follow up on ultra sound of her breast. Please call when ready.  Please advise

## 2022-04-07 ENCOUNTER — HOSPITAL ENCOUNTER (OUTPATIENT)
Dept: ULTRASOUND IMAGING | Facility: HOSPITAL | Age: 62
Discharge: HOME | End: 2022-04-07
Attending: NURSE PRACTITIONER
Payer: COMMERCIAL

## 2022-04-07 ENCOUNTER — HOSPITAL ENCOUNTER (OUTPATIENT)
Dept: MAMMOGRAPHY | Facility: HOSPITAL | Age: 62
Discharge: HOME | End: 2022-04-07
Attending: NURSE PRACTITIONER
Payer: COMMERCIAL

## 2022-04-07 DIAGNOSIS — N64.59 ABNORMAL BREAST EXAM: ICD-10-CM

## 2022-04-07 PROCEDURE — 77061 BREAST TOMOSYNTHESIS UNI: CPT | Performed by: NURSE PRACTITIONER

## 2022-04-07 PROCEDURE — 77065 DX MAMMO INCL CAD UNI: CPT | Performed by: NURSE PRACTITIONER

## 2022-04-07 PROCEDURE — 76642 ULTRASOUND BREAST LIMITED: CPT | Performed by: NURSE PRACTITIONER

## 2022-04-28 ENCOUNTER — NURSE TRIAGE (OUTPATIENT)
Dept: INTERNAL MEDICINE CLINIC | Facility: CLINIC | Age: 62
End: 2022-04-28

## 2022-04-28 NOTE — TELEPHONE ENCOUNTER
----- Message -----  From: Avtar Lozano  Sent: 4/28/2022   7:26 AM CDT  To: Em Rn Triage  Subject: Orthopedic Dr                                    My knee is swollen and it the pain goes all the way into the foot can you refer an orthopedic surgeon or doctor please

## 2022-05-03 ENCOUNTER — OFFICE VISIT (OUTPATIENT)
Dept: ORTHOPEDICS CLINIC | Facility: CLINIC | Age: 62
End: 2022-05-03
Payer: COMMERCIAL

## 2022-05-03 ENCOUNTER — HOSPITAL ENCOUNTER (OUTPATIENT)
Dept: GENERAL RADIOLOGY | Facility: HOSPITAL | Age: 62
Discharge: HOME | End: 2022-05-03
Attending: ORTHOPAEDIC SURGERY
Payer: COMMERCIAL

## 2022-05-03 VITALS — DIASTOLIC BLOOD PRESSURE: 77 MMHG | SYSTOLIC BLOOD PRESSURE: 126 MMHG

## 2022-05-03 DIAGNOSIS — M70.52 PES ANSERINUS BURSITIS OF LEFT KNEE: ICD-10-CM

## 2022-05-03 DIAGNOSIS — M25.562 LEFT KNEE PAIN, UNSPECIFIED CHRONICITY: ICD-10-CM

## 2022-05-03 DIAGNOSIS — M22.42 CHONDROMALACIA OF LEFT PATELLA: Primary | ICD-10-CM

## 2022-05-03 PROCEDURE — 20610 DRAIN/INJ JOINT/BURSA W/O US: CPT | Performed by: ORTHOPAEDIC SURGERY

## 2022-05-03 PROCEDURE — 99243 OFF/OP CNSLTJ NEW/EST LOW 30: CPT | Performed by: ORTHOPAEDIC SURGERY

## 2022-05-03 PROCEDURE — 3074F SYST BP LT 130 MM HG: CPT | Performed by: ORTHOPAEDIC SURGERY

## 2022-05-03 PROCEDURE — 3078F DIAST BP <80 MM HG: CPT | Performed by: ORTHOPAEDIC SURGERY

## 2022-05-03 PROCEDURE — 73564 X-RAY EXAM KNEE 4 OR MORE: CPT | Performed by: ORTHOPAEDIC SURGERY

## 2022-05-03 RX ORDER — TRIAMCINOLONE ACETONIDE 40 MG/ML
40 INJECTION, SUSPENSION INTRA-ARTICULAR; INTRAMUSCULAR ONCE
Status: COMPLETED | OUTPATIENT
Start: 2022-05-03 | End: 2022-05-03

## 2022-05-03 NOTE — PROGRESS NOTES
Per verbal order from Dr. Jihan Sierra, draw up 4ml of 1% lidocaine and 1ml of Kenalog 40 for cortisone injection to the left knee.

## 2022-06-02 ENCOUNTER — PATIENT MESSAGE (OUTPATIENT)
Dept: INTERNAL MEDICINE CLINIC | Facility: CLINIC | Age: 62
End: 2022-06-02

## 2022-06-02 NOTE — TELEPHONE ENCOUNTER
From: Avtar Lozano  To: Peyton Zamora MD  Sent: 6/2/2022 12:07 PM CDT  Subject: Continous Muscle Spasms. .. Since 1am , I had continuous Muscle spasms in my lower right leg. ..calf. ..foot. ..now moving to lower left calf foot. ..hard to walk. ..severe pain. ..had diarrhea & dizziness. ..at least I do not have that any longer. .. please advise or prescribe muscle relaxer?  Thank u in advance

## 2022-06-03 NOTE — TELEPHONE ENCOUNTER
Patient states since 1 am on Wednesday having abdominal cramping and diarrhea. Reports a bowel movement every 1.5 hours and attempting to stay hydrated with Gatorade and water. States her cramps are severe, but then relieved after she has a bowel movement. Patient states she cannot go to ER/UC due to how frequent her bowel movements are. Informed patient she most likely is dehydrated and may need IV hydration. Patient still reluctant to go and asking if ok to take Imodium. Attempted to schedule a virtual appt with a provider, but patient does not feel that will help. Patient denies fever, cough, congestion, shortness of breath, vomiting. States she's tested negative for Covid at home. Patient then stated she may go to the urgent care in [address] [place] [address].

## 2022-06-16 ENCOUNTER — TELEPHONE (OUTPATIENT)
Dept: INTERNAL MEDICINE CLINIC | Facility: CLINIC | Age: 62
End: 2022-06-16

## 2022-06-16 DIAGNOSIS — Z00.00 ROUTINE PHYSICAL EXAMINATION: Primary | ICD-10-CM

## 2022-06-16 NOTE — TELEPHONE ENCOUNTER
Patient requesting an appointment for her physical, but there are no available appointments until September. Patient states that she made an appointment for July, but there is nothing in the system and is upset that there was a mistake. Please advise.

## 2022-06-17 ENCOUNTER — OFFICE VISIT (OUTPATIENT)
Dept: FAMILY MEDICINE CLINIC | Facility: CLINIC | Age: 62
End: 2022-06-17
Payer: COMMERCIAL

## 2022-06-17 ENCOUNTER — NURSE TRIAGE (OUTPATIENT)
Dept: INTERNAL MEDICINE CLINIC | Facility: CLINIC | Age: 62
End: 2022-06-17

## 2022-06-17 VITALS
WEIGHT: 131 LBS | HEIGHT: 63.5 IN | BODY MASS INDEX: 22.92 KG/M2 | HEART RATE: 99 BPM | SYSTOLIC BLOOD PRESSURE: 120 MMHG | DIASTOLIC BLOOD PRESSURE: 81 MMHG

## 2022-06-17 DIAGNOSIS — R60.0 BILATERAL LEG EDEMA: Primary | ICD-10-CM

## 2022-06-17 PROCEDURE — 3008F BODY MASS INDEX DOCD: CPT | Performed by: PHYSICIAN ASSISTANT

## 2022-06-17 PROCEDURE — 99213 OFFICE O/P EST LOW 20 MIN: CPT | Performed by: PHYSICIAN ASSISTANT

## 2022-06-17 PROCEDURE — 3079F DIAST BP 80-89 MM HG: CPT | Performed by: PHYSICIAN ASSISTANT

## 2022-06-17 PROCEDURE — 3074F SYST BP LT 130 MM HG: CPT | Performed by: PHYSICIAN ASSISTANT

## 2022-06-17 RX ORDER — METHYLPREDNISOLONE 4 MG/1
TABLET ORAL
COMMUNITY
Start: 2021-12-27 | End: 2022-06-17 | Stop reason: ALTCHOICE

## 2022-06-17 RX ORDER — HYDROCHLOROTHIAZIDE 12.5 MG/1
12.5 TABLET ORAL DAILY
Qty: 90 TABLET | Refills: 1 | Status: SHIPPED | OUTPATIENT
Start: 2022-06-17 | End: 2022-06-20

## 2022-06-17 RX ORDER — MAGNESIUM OXIDE TAB 400 MG (241.3 MG ELEMENTAL MG) 400 (241.3 MG) MG
TAB ORAL
COMMUNITY
Start: 2022-06-02 | End: 2022-06-17

## 2022-06-19 ENCOUNTER — TELEPHONE (OUTPATIENT)
Dept: INTERNAL MEDICINE CLINIC | Facility: CLINIC | Age: 62
End: 2022-06-19

## 2022-06-20 NOTE — TELEPHONE ENCOUNTER
Please stop the medication and follow up in office today or tomorrow.     ANAM Amador  Working with Katheryn Huerta

## 2022-06-20 NOTE — TELEPHONE ENCOUNTER
Aneta Chen APRN - patient declines appointment. Last seen 6/17  Only wants medications adjusted. Please advise on Medication change. Already stopped the medication. Says swelling in ankles and feet have improved. Denies new symptoms.      Also scheduled patient for annual physical.     Future Appointments   Date Time Provider Hubert Hanna   7/11/2022  3:40 PM Deon Snellen, MD [facility]

## 2022-06-20 NOTE — TELEPHONE ENCOUNTER
Patient called asking for a different medication to be prescribed instead of hydrochlorothiazide. Patient was seen in the office 6/17/22. Patient states she recently started taking hydrochlorothiazide and feels she is allergic to it. Patient states she can't breathe when she takes this medication. Patient states the same thing happens when she takes Lisinopril and penicillin. Patient has stopped taking hydrochlorothiazide  Allergy list updated. Please advise  and Sondra Murdock PA-C out of office.

## 2022-06-20 NOTE — TELEPHONE ENCOUNTER
Patient states she already talked to WALTER Vargas and will follow up with Jenise Gomez at her next appointment 7/11/22. 04-May-2018 12:00 immune

## 2022-06-20 NOTE — TELEPHONE ENCOUNTER
Claude Glen - so to clarify - no medication adjustments at this time, correct?      Please reply to kimi: LAVELLE Duvall

## 2022-06-20 NOTE — TELEPHONE ENCOUNTER
Dr. Johnson Dobbins - patient request: labs, so she can complete it before the annual physical.    Annual Physical was made for 7/11 3:40pm.     RN called patient for medication question - see Patient Message Encounter. Also discussed this. Appointment made.      Future Appointments   Date Time Provider Hubert Hanna   7/11/2022  3:40 PM Alonso Merlos MD [place] [id number]

## 2022-06-20 NOTE — TELEPHONE ENCOUNTER
Patient states  she had a reaction to the hydrochlorothiazide and felt SOB. She stopped it and now has no edema. She has an appointment with Dr. Gisel Thorpe on 7/11/2022.

## 2022-07-07 ENCOUNTER — LAB ENCOUNTER (OUTPATIENT)
Dept: LAB | Facility: HOSPITAL | Age: 62
End: 2022-07-07
Attending: INTERNAL MEDICINE
Payer: COMMERCIAL

## 2022-07-07 DIAGNOSIS — Z00.00 ROUTINE PHYSICAL EXAMINATION: ICD-10-CM

## 2022-07-07 LAB
ALBUMIN SERPL-MCNC: 3.9 G/DL (ref 3.4–5)
ALBUMIN/GLOB SERPL: 1.1 {RATIO} (ref 1–2)
ALP LIVER SERPL-CCNC: 77 U/L
ALT SERPL-CCNC: 32 U/L
ANION GAP SERPL CALC-SCNC: 7 MMOL/L (ref 0–18)
AST SERPL-CCNC: 23 U/L (ref 15–37)
BASOPHILS # BLD AUTO: 0.02 X10(3) UL (ref 0–0.2)
BASOPHILS NFR BLD AUTO: 0.3 %
BILIRUB SERPL-MCNC: 1.2 MG/DL (ref 0.1–2)
BUN BLD-MCNC: 19 MG/DL (ref 7–18)
BUN/CREAT SERPL: 20.7 (ref 10–20)
CALCIUM BLD-MCNC: 9.3 MG/DL (ref 8.5–10.1)
CHLORIDE SERPL-SCNC: 103 MMOL/L (ref 98–112)
CHOLEST SERPL-MCNC: 222 MG/DL (ref ?–200)
CO2 SERPL-SCNC: 27 MMOL/L (ref 21–32)
CREAT BLD-MCNC: 0.92 MG/DL
DEPRECATED RDW RBC AUTO: 49.2 FL (ref 35.1–46.3)
EOSINOPHIL # BLD AUTO: 0.03 X10(3) UL (ref 0–0.7)
EOSINOPHIL NFR BLD AUTO: 0.5 %
ERYTHROCYTE [DISTWIDTH] IN BLOOD BY AUTOMATED COUNT: 14 % (ref 11–15)
EST. AVERAGE GLUCOSE BLD GHB EST-MCNC: 126 MG/DL (ref 68–126)
FASTING PATIENT LIPID ANSWER: YES
FASTING STATUS PATIENT QL REPORTED: YES
GLOBULIN PLAS-MCNC: 3.7 G/DL (ref 2.8–4.4)
GLUCOSE BLD-MCNC: 105 MG/DL (ref 70–99)
HBA1C MFR BLD: 6 % (ref ?–5.7)
HCT VFR BLD AUTO: 48.3 %
HDLC SERPL-MCNC: 68 MG/DL (ref 40–59)
HGB BLD-MCNC: 15.4 G/DL
IMM GRANULOCYTES # BLD AUTO: 0.02 X10(3) UL (ref 0–1)
IMM GRANULOCYTES NFR BLD: 0.3 %
LDLC SERPL CALC-MCNC: 140 MG/DL (ref ?–100)
LYMPHOCYTES # BLD AUTO: 1.79 X10(3) UL (ref 1–4)
LYMPHOCYTES NFR BLD AUTO: 27 %
MCH RBC QN AUTO: 30.7 PG (ref 26–34)
MCHC RBC AUTO-ENTMCNC: 31.9 G/DL (ref 31–37)
MCV RBC AUTO: 96.4 FL
MONOCYTES # BLD AUTO: 0.38 X10(3) UL (ref 0.1–1)
MONOCYTES NFR BLD AUTO: 5.7 %
NEUTROPHILS # BLD AUTO: 4.39 X10 (3) UL (ref 1.5–7.7)
NEUTROPHILS # BLD AUTO: 4.39 X10(3) UL (ref 1.5–7.7)
NEUTROPHILS NFR BLD AUTO: 66.2 %
NONHDLC SERPL-MCNC: 154 MG/DL (ref ?–130)
OSMOLALITY SERPL CALC.SUM OF ELEC: 287 MOSM/KG (ref 275–295)
PLATELET # BLD AUTO: 262 10(3)UL (ref 150–450)
POTASSIUM SERPL-SCNC: 3.9 MMOL/L (ref 3.5–5.1)
PROT SERPL-MCNC: 7.6 G/DL (ref 6.4–8.2)
RBC # BLD AUTO: 5.01 X10(6)UL
SODIUM SERPL-SCNC: 137 MMOL/L (ref 136–145)
TRIGL SERPL-MCNC: 80 MG/DL (ref 30–149)
TSI SER-ACNC: 2.8 MIU/ML (ref 0.36–3.74)
VIT D+METAB SERPL-MCNC: 54.4 NG/ML (ref 30–100)
VLDLC SERPL CALC-MCNC: 15 MG/DL (ref 0–30)
WBC # BLD AUTO: 6.6 X10(3) UL (ref 4–11)

## 2022-07-07 PROCEDURE — 80061 LIPID PANEL: CPT

## 2022-07-07 PROCEDURE — 84443 ASSAY THYROID STIM HORMONE: CPT

## 2022-07-07 PROCEDURE — 80053 COMPREHEN METABOLIC PANEL: CPT

## 2022-07-07 PROCEDURE — 82306 VITAMIN D 25 HYDROXY: CPT

## 2022-07-07 PROCEDURE — 83036 HEMOGLOBIN GLYCOSYLATED A1C: CPT

## 2022-07-07 PROCEDURE — 36415 COLL VENOUS BLD VENIPUNCTURE: CPT

## 2022-07-07 PROCEDURE — 85025 COMPLETE CBC W/AUTO DIFF WBC: CPT

## 2022-07-11 ENCOUNTER — OFFICE VISIT (OUTPATIENT)
Dept: INTERNAL MEDICINE CLINIC | Facility: CLINIC | Age: 62
End: 2022-07-11
Payer: COMMERCIAL

## 2022-07-11 VITALS
HEART RATE: 76 BPM | HEIGHT: 63.5 IN | SYSTOLIC BLOOD PRESSURE: 118 MMHG | BODY MASS INDEX: 23.8 KG/M2 | DIASTOLIC BLOOD PRESSURE: 78 MMHG | WEIGHT: 136 LBS

## 2022-07-11 DIAGNOSIS — I48.91 ATRIAL FIBRILLATION, UNSPECIFIED TYPE (HCC): ICD-10-CM

## 2022-07-11 DIAGNOSIS — Z00.00 ROUTINE PHYSICAL EXAMINATION: Primary | ICD-10-CM

## 2022-07-11 DIAGNOSIS — G43.409 FAMILIAL HEMIPLEGIC MIGRAINE: ICD-10-CM

## 2022-07-11 DIAGNOSIS — I42.0 DILATED CARDIOMYOPATHY (HCC): ICD-10-CM

## 2022-07-11 PROCEDURE — 99396 PREV VISIT EST AGE 40-64: CPT | Performed by: INTERNAL MEDICINE

## 2022-07-11 PROCEDURE — 3078F DIAST BP <80 MM HG: CPT | Performed by: INTERNAL MEDICINE

## 2022-07-11 PROCEDURE — 3074F SYST BP LT 130 MM HG: CPT | Performed by: INTERNAL MEDICINE

## 2022-07-11 PROCEDURE — 3008F BODY MASS INDEX DOCD: CPT | Performed by: INTERNAL MEDICINE

## 2022-10-25 ENCOUNTER — OFFICE VISIT (OUTPATIENT)
Dept: PODIATRY CLINIC | Facility: CLINIC | Age: 62
End: 2022-10-25
Payer: COMMERCIAL

## 2022-10-25 ENCOUNTER — HOSPITAL ENCOUNTER (OUTPATIENT)
Dept: GENERAL RADIOLOGY | Facility: HOSPITAL | Age: 62
Discharge: HOME | End: 2022-10-25
Attending: PODIATRIST
Payer: COMMERCIAL

## 2022-10-25 DIAGNOSIS — R52 PAIN: ICD-10-CM

## 2022-10-25 DIAGNOSIS — R52 PAIN: Primary | ICD-10-CM

## 2022-10-25 DIAGNOSIS — G62.9 NEUROPATHY: ICD-10-CM

## 2022-10-25 PROCEDURE — 99203 OFFICE O/P NEW LOW 30 MIN: CPT | Performed by: PODIATRIST

## 2022-10-25 PROCEDURE — 73630 X-RAY EXAM OF FOOT: CPT | Performed by: PODIATRIST

## 2022-11-21 ENCOUNTER — PROCEDURE VISIT (OUTPATIENT)
Dept: PHYSICAL MEDICINE AND REHAB | Facility: CLINIC | Age: 62
End: 2022-11-21
Payer: COMMERCIAL

## 2022-11-21 DIAGNOSIS — M79.605 LEFT LEG PAIN: ICD-10-CM

## 2022-11-21 DIAGNOSIS — R20.0 LEFT LEG NUMBNESS: ICD-10-CM

## 2022-11-21 PROCEDURE — 95886 MUSC TEST DONE W/N TEST COMP: CPT | Performed by: PHYSICAL MEDICINE & REHABILITATION

## 2022-11-21 PROCEDURE — 95908 NRV CNDJ TST 3-4 STUDIES: CPT | Performed by: PHYSICAL MEDICINE & REHABILITATION

## 2022-11-29 ENCOUNTER — OFFICE VISIT (OUTPATIENT)
Dept: PODIATRY CLINIC | Facility: CLINIC | Age: 62
End: 2022-11-29
Payer: COMMERCIAL

## 2022-11-29 DIAGNOSIS — G62.9 NEUROPATHY: Primary | ICD-10-CM

## 2022-11-29 PROCEDURE — 99212 OFFICE O/P EST SF 10 MIN: CPT | Performed by: PODIATRIST

## 2022-12-09 ENCOUNTER — OFFICE VISIT (OUTPATIENT)
Dept: PODIATRY CLINIC | Facility: CLINIC | Age: 62
End: 2022-12-09
Payer: COMMERCIAL

## 2022-12-09 DIAGNOSIS — M79.605 CHRONIC PAIN OF LEFT LOWER EXTREMITY: Primary | ICD-10-CM

## 2022-12-09 DIAGNOSIS — G89.29 CHRONIC PAIN OF LEFT LOWER EXTREMITY: Primary | ICD-10-CM

## 2022-12-09 DIAGNOSIS — R20.2 PARESTHESIA OF LEFT LOWER EXTREMITY: ICD-10-CM

## 2022-12-09 DIAGNOSIS — M21.6X2 PLANTAR FLEXED METATARSAL BONE OF LEFT FOOT: ICD-10-CM

## 2022-12-09 PROCEDURE — 99214 OFFICE O/P EST MOD 30 MIN: CPT | Performed by: PODIATRIST

## 2022-12-27 ENCOUNTER — LAB ENCOUNTER (OUTPATIENT)
Dept: LAB | Facility: HOSPITAL | Age: 62
End: 2022-12-27
Attending: INTERNAL MEDICINE
Payer: COMMERCIAL

## 2022-12-27 ENCOUNTER — OFFICE VISIT (OUTPATIENT)
Dept: PHYSICAL MEDICINE AND REHAB | Facility: CLINIC | Age: 62
End: 2022-12-27
Payer: COMMERCIAL

## 2022-12-27 VITALS — BODY MASS INDEX: 23.8 KG/M2 | HEART RATE: 115 BPM | HEIGHT: 63.25 IN | OXYGEN SATURATION: 98 % | WEIGHT: 136 LBS

## 2022-12-27 DIAGNOSIS — Z86.79 PERSONAL HISTORY OF UNSPECIFIED CIRCULATORY DISEASE: Primary | ICD-10-CM

## 2022-12-27 DIAGNOSIS — R20.0 LEFT LEG NUMBNESS: ICD-10-CM

## 2022-12-27 DIAGNOSIS — M79.605 LEFT LEG PAIN: Primary | ICD-10-CM

## 2022-12-27 DIAGNOSIS — Z79.01 LONG TERM (CURRENT) USE OF ANTICOAGULANTS: ICD-10-CM

## 2022-12-27 LAB
ALBUMIN SERPL-MCNC: 3.5 G/DL (ref 3.4–5)
ALBUMIN/GLOB SERPL: 0.9 {RATIO} (ref 1–2)
ALP LIVER SERPL-CCNC: 94 U/L
ALT SERPL-CCNC: 23 U/L
ANION GAP SERPL CALC-SCNC: 7 MMOL/L (ref 0–18)
AST SERPL-CCNC: 22 U/L (ref 15–37)
BASOPHILS # BLD AUTO: 0.02 X10(3) UL (ref 0–0.2)
BASOPHILS NFR BLD AUTO: 0.3 %
BILIRUB SERPL-MCNC: 0.8 MG/DL (ref 0.1–2)
BUN BLD-MCNC: 12 MG/DL (ref 7–18)
BUN/CREAT SERPL: 16.2 (ref 10–20)
CALCIUM BLD-MCNC: 9 MG/DL (ref 8.5–10.1)
CHLORIDE SERPL-SCNC: 103 MMOL/L (ref 98–112)
CHOLEST SERPL-MCNC: 184 MG/DL (ref ?–200)
CO2 SERPL-SCNC: 29 MMOL/L (ref 21–32)
CREAT BLD-MCNC: 0.74 MG/DL
DEPRECATED RDW RBC AUTO: 46.4 FL (ref 35.1–46.3)
EOSINOPHIL # BLD AUTO: 0.55 X10(3) UL (ref 0–0.7)
EOSINOPHIL NFR BLD AUTO: 9.4 %
ERYTHROCYTE [DISTWIDTH] IN BLOOD BY AUTOMATED COUNT: 13.1 % (ref 11–15)
FASTING PATIENT LIPID ANSWER: YES
FASTING STATUS PATIENT QL REPORTED: YES
GFR SERPLBLD BASED ON 1.73 SQ M-ARVRAT: 93 ML/MIN/1.73M2 (ref 60–?)
GLOBULIN PLAS-MCNC: 3.9 G/DL (ref 2.8–4.4)
GLUCOSE BLD-MCNC: 120 MG/DL (ref 70–99)
HCT VFR BLD AUTO: 45.3 %
HDLC SERPL-MCNC: 52 MG/DL (ref 40–59)
HGB BLD-MCNC: 14.3 G/DL
IMM GRANULOCYTES # BLD AUTO: 0.01 X10(3) UL (ref 0–1)
IMM GRANULOCYTES NFR BLD: 0.2 %
LDLC SERPL CALC-MCNC: 122 MG/DL (ref ?–100)
LYMPHOCYTES # BLD AUTO: 2.14 X10(3) UL (ref 1–4)
LYMPHOCYTES NFR BLD AUTO: 36.5 %
MCH RBC QN AUTO: 29.9 PG (ref 26–34)
MCHC RBC AUTO-ENTMCNC: 31.6 G/DL (ref 31–37)
MCV RBC AUTO: 94.8 FL
MONOCYTES # BLD AUTO: 0.43 X10(3) UL (ref 0.1–1)
MONOCYTES NFR BLD AUTO: 7.3 %
NEUTROPHILS # BLD AUTO: 2.71 X10 (3) UL (ref 1.5–7.7)
NEUTROPHILS # BLD AUTO: 2.71 X10(3) UL (ref 1.5–7.7)
NEUTROPHILS NFR BLD AUTO: 46.3 %
NONHDLC SERPL-MCNC: 132 MG/DL (ref ?–130)
OSMOLALITY SERPL CALC.SUM OF ELEC: 289 MOSM/KG (ref 275–295)
PLATELET # BLD AUTO: 233 10(3)UL (ref 150–450)
POTASSIUM SERPL-SCNC: 4.4 MMOL/L (ref 3.5–5.1)
PROT SERPL-MCNC: 7.4 G/DL (ref 6.4–8.2)
RBC # BLD AUTO: 4.78 X10(6)UL
SODIUM SERPL-SCNC: 139 MMOL/L (ref 136–145)
TRIGL SERPL-MCNC: 53 MG/DL (ref 30–149)
VLDLC SERPL CALC-MCNC: 9 MG/DL (ref 0–30)
WBC # BLD AUTO: 5.9 X10(3) UL (ref 4–11)

## 2022-12-27 PROCEDURE — 80053 COMPREHEN METABOLIC PANEL: CPT

## 2022-12-27 PROCEDURE — 3008F BODY MASS INDEX DOCD: CPT | Performed by: PHYSICAL MEDICINE & REHABILITATION

## 2022-12-27 PROCEDURE — 80061 LIPID PANEL: CPT

## 2022-12-27 PROCEDURE — 85025 COMPLETE CBC W/AUTO DIFF WBC: CPT

## 2022-12-27 PROCEDURE — 36415 COLL VENOUS BLD VENIPUNCTURE: CPT

## 2022-12-27 PROCEDURE — 99244 OFF/OP CNSLTJ NEW/EST MOD 40: CPT | Performed by: PHYSICAL MEDICINE & REHABILITATION

## 2022-12-29 ENCOUNTER — TELEPHONE (OUTPATIENT)
Dept: PHYSICAL MEDICINE AND REHAB | Facility: CLINIC | Age: 62
End: 2022-12-29

## 2022-12-29 DIAGNOSIS — M79.605 LEFT LEG PAIN: Primary | ICD-10-CM

## 2022-12-29 DIAGNOSIS — R20.0 LEFT LEG NUMBNESS: ICD-10-CM

## 2022-12-29 RX ORDER — GABAPENTIN 100 MG/1
100 CAPSULE ORAL 3 TIMES DAILY
Qty: 90 CAPSULE | Refills: 0 | Status: SHIPPED | OUTPATIENT
Start: 2022-12-29

## 2022-12-29 NOTE — TELEPHONE ENCOUNTER
Pharmacy calling stating original Gabapentin order was prescribed as 50MG tablets for which that medication is not dispensed that way. Dr. Vignesh Evans ordered medication to be dispensed in 100MG doses. Will send new order to pharmacy to reflect this. Will cancel previous order.

## 2023-01-15 ENCOUNTER — PATIENT MESSAGE (OUTPATIENT)
Dept: PHYSICAL MEDICINE AND REHAB | Facility: CLINIC | Age: 63
End: 2023-01-15

## 2023-01-16 NOTE — TELEPHONE ENCOUNTER
From: Yariel Woods  To: Derek Amaya. Stephon Eugene DO  Sent: 1/15/2023 7:44 PM CST  Subject: Results    Hi. ..I haven't heard from you. ..whats your opinion on my Mri results?  Please advise or call me

## 2023-01-16 NOTE — TELEPHONE ENCOUNTER
Pt. informed Dr. Sophie Jeff is requesting a video visit to discuss MRII result.  Scheduled  oon 01/20/23 at 11:40 AM.

## 2023-01-20 ENCOUNTER — TELEMEDICINE (OUTPATIENT)
Dept: PHYSICAL MEDICINE AND REHAB | Facility: CLINIC | Age: 63
End: 2023-01-20
Payer: COMMERCIAL

## 2023-01-20 DIAGNOSIS — M54.16 LEFT LUMBAR RADICULOPATHY: Primary | ICD-10-CM

## 2023-01-20 PROCEDURE — 99214 OFFICE O/P EST MOD 30 MIN: CPT | Performed by: PHYSICAL MEDICINE & REHABILITATION

## 2023-04-08 NOTE — TELEPHONE ENCOUNTER
HR=90 bpm, NIBP=85/57 mmhg, SpO2=98 %, Resp=15 B/min, EtCO2=35 mmHg, Pain=0, Miller=2, Comment=sr prior auth for cardioversion

## 2023-07-07 ENCOUNTER — APPOINTMENT (OUTPATIENT)
Dept: GENERAL RADIOLOGY | Age: 63
End: 2023-07-07
Attending: EMERGENCY MEDICINE
Payer: COMMERCIAL

## 2023-07-07 ENCOUNTER — HOSPITAL ENCOUNTER (OUTPATIENT)
Age: 63
Discharge: HOME | End: 2023-07-07
Attending: EMERGENCY MEDICINE
Payer: COMMERCIAL

## 2023-07-07 VITALS
DIASTOLIC BLOOD PRESSURE: 75 MMHG | SYSTOLIC BLOOD PRESSURE: 118 MMHG | TEMPERATURE: 98 F | OXYGEN SATURATION: 96 % | HEART RATE: 72 BPM | RESPIRATION RATE: 18 BRPM

## 2023-07-07 DIAGNOSIS — M65.311 TRIGGER FINGER OF RIGHT THUMB: Primary | ICD-10-CM

## 2023-07-07 PROCEDURE — 73140 X-RAY EXAM OF FINGER(S): CPT | Performed by: EMERGENCY MEDICINE

## 2023-07-07 PROCEDURE — 99202 OFFICE O/P NEW SF 15 MIN: CPT

## 2023-07-07 PROCEDURE — 99213 OFFICE O/P EST LOW 20 MIN: CPT

## 2023-07-07 NOTE — ED INITIAL ASSESSMENT (HPI)
Patient arrived ambulatory to room c/o pain to the right thumb. Patient states she was opening a jar of pickles 5 weeks ago and injured her thumb. Pain worsens with movement.

## 2023-07-13 ENCOUNTER — TELEPHONE (OUTPATIENT)
Dept: SURGERY | Facility: CLINIC | Age: 63
End: 2023-07-13

## 2023-07-13 ENCOUNTER — OFFICE VISIT (OUTPATIENT)
Dept: SURGERY | Facility: CLINIC | Age: 63
End: 2023-07-13

## 2023-07-13 ENCOUNTER — LAB ENCOUNTER (OUTPATIENT)
Dept: LAB | Facility: HOSPITAL | Age: 63
End: 2023-07-13
Attending: INTERNAL MEDICINE
Payer: COMMERCIAL

## 2023-07-13 DIAGNOSIS — Z00.00 ROUTINE PHYSICAL EXAMINATION: ICD-10-CM

## 2023-07-13 DIAGNOSIS — M65.311 TRIGGER THUMB OF RIGHT HAND: Primary | ICD-10-CM

## 2023-07-13 LAB
ALBUMIN SERPL-MCNC: 3.7 G/DL (ref 3.4–5)
ALBUMIN/GLOB SERPL: 1 {RATIO} (ref 1–2)
ALP LIVER SERPL-CCNC: 81 U/L
ALT SERPL-CCNC: 23 U/L
ANION GAP SERPL CALC-SCNC: 6 MMOL/L (ref 0–18)
AST SERPL-CCNC: 23 U/L (ref 15–37)
BASOPHILS # BLD AUTO: 0.01 X10(3) UL (ref 0–0.2)
BASOPHILS NFR BLD AUTO: 0.2 %
BILIRUB SERPL-MCNC: 0.9 MG/DL (ref 0.1–2)
BUN BLD-MCNC: 16 MG/DL (ref 7–18)
BUN/CREAT SERPL: 21.3 (ref 10–20)
CALCIUM BLD-MCNC: 9.7 MG/DL (ref 8.5–10.1)
CHLORIDE SERPL-SCNC: 105 MMOL/L (ref 98–112)
CHOLEST SERPL-MCNC: 219 MG/DL (ref ?–200)
CO2 SERPL-SCNC: 27 MMOL/L (ref 21–32)
CREAT BLD-MCNC: 0.75 MG/DL
DEPRECATED RDW RBC AUTO: 46.2 FL (ref 35.1–46.3)
EOSINOPHIL # BLD AUTO: 0.19 X10(3) UL (ref 0–0.7)
EOSINOPHIL NFR BLD AUTO: 2.9 %
ERYTHROCYTE [DISTWIDTH] IN BLOOD BY AUTOMATED COUNT: 13.4 % (ref 11–15)
EST. AVERAGE GLUCOSE BLD GHB EST-MCNC: 131 MG/DL (ref 68–126)
FASTING PATIENT LIPID ANSWER: YES
FASTING STATUS PATIENT QL REPORTED: YES
GFR SERPLBLD BASED ON 1.73 SQ M-ARVRAT: 91 ML/MIN/1.73M2 (ref 60–?)
GLOBULIN PLAS-MCNC: 3.7 G/DL (ref 2.8–4.4)
GLUCOSE BLD-MCNC: 102 MG/DL (ref 70–99)
HBA1C MFR BLD: 6.2 % (ref ?–5.7)
HCT VFR BLD AUTO: 45.9 %
HDLC SERPL-MCNC: 50 MG/DL (ref 40–59)
HGB BLD-MCNC: 14.8 G/DL
IMM GRANULOCYTES # BLD AUTO: 0.01 X10(3) UL (ref 0–1)
IMM GRANULOCYTES NFR BLD: 0.2 %
LDLC SERPL CALC-MCNC: 159 MG/DL (ref ?–100)
LYMPHOCYTES # BLD AUTO: 2.8 X10(3) UL (ref 1–4)
LYMPHOCYTES NFR BLD AUTO: 42.5 %
MCH RBC QN AUTO: 30 PG (ref 26–34)
MCHC RBC AUTO-ENTMCNC: 32.2 G/DL (ref 31–37)
MCV RBC AUTO: 93.1 FL
MONOCYTES # BLD AUTO: 0.48 X10(3) UL (ref 0.1–1)
MONOCYTES NFR BLD AUTO: 7.3 %
NEUTROPHILS # BLD AUTO: 3.1 X10 (3) UL (ref 1.5–7.7)
NEUTROPHILS # BLD AUTO: 3.1 X10(3) UL (ref 1.5–7.7)
NEUTROPHILS NFR BLD AUTO: 46.9 %
NONHDLC SERPL-MCNC: 169 MG/DL (ref ?–130)
OSMOLALITY SERPL CALC.SUM OF ELEC: 287 MOSM/KG (ref 275–295)
PLATELET # BLD AUTO: 262 10(3)UL (ref 150–450)
POTASSIUM SERPL-SCNC: 4.6 MMOL/L (ref 3.5–5.1)
PROT SERPL-MCNC: 7.4 G/DL (ref 6.4–8.2)
RBC # BLD AUTO: 4.93 X10(6)UL
SODIUM SERPL-SCNC: 138 MMOL/L (ref 136–145)
TRIGL SERPL-MCNC: 58 MG/DL (ref 30–149)
TSI SER-ACNC: 2.43 MIU/ML (ref 0.36–3.74)
VIT B12 SERPL-MCNC: >2000 PG/ML (ref 193–986)
VLDLC SERPL CALC-MCNC: 11 MG/DL (ref 0–30)
WBC # BLD AUTO: 6.6 X10(3) UL (ref 4–11)

## 2023-07-13 PROCEDURE — 80053 COMPREHEN METABOLIC PANEL: CPT

## 2023-07-13 PROCEDURE — 80061 LIPID PANEL: CPT

## 2023-07-13 PROCEDURE — 83036 HEMOGLOBIN GLYCOSYLATED A1C: CPT

## 2023-07-13 PROCEDURE — 36415 COLL VENOUS BLD VENIPUNCTURE: CPT

## 2023-07-13 PROCEDURE — 85025 COMPLETE CBC W/AUTO DIFF WBC: CPT

## 2023-07-13 PROCEDURE — 99244 OFF/OP CNSLTJ NEW/EST MOD 40: CPT | Performed by: PLASTIC SURGERY

## 2023-07-13 PROCEDURE — 84443 ASSAY THYROID STIM HORMONE: CPT

## 2023-07-13 PROCEDURE — 82607 VITAMIN B-12: CPT

## 2023-07-13 NOTE — TELEPHONE ENCOUNTER
Per Dr Char Wilson verbal order pt to be off Eliquis 3 days prior to steroid injection to R thumb by Dr Char Wilson, may schedule injection once clearance is received. Spoke with Dr Paulette Robin office to receive fax number and let them know we will be faxing anticoagulant clearance form, fax is [phone], they verbalized understanding. Anticoagulant clearance form faxed to Dr Paulette Robin office.

## 2023-07-13 NOTE — H&P
Injury 1: RTH injury  - Date: 06/02/23  - Days Since: Ana Luisa Kiran is a 66year old female that presents with Patient presents with:  Pain: RTH  . 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 X-ray negative and referred here    Right hand splint has helped    Constant pain at the base of the right thumb with triggering and locking      Currently cannot flex beyond 20 degrees      Review of Systems:   Constitutional: No change in appetite, chill/rigors, or fatigue  GI: No jaundice  Endocrine: No generalized weakness  Neurological: No aphasia, loss of consciousness, or seizures    Musculoskeletal:      Date of injury 6/2/23     Location right      thumb      Mechanism Opening a jar of pickles     Treatment Seen in immediate care,  7/7/23 xray done and referred to Dr Cherelle Barroso                                        Pt then purchased and starting wearing a RH brace,helpful. Pain  yes, constant base of RTH,sharp. Rates pain 8/10. Not taking analgesics. Numbness None  While opening a jar felt snap in RTH,has had pain since then and then limited ROM. difficulty with flexion. Also c/o locking and clicking.       PMH:     MEDICAL  Past Medical History:   Diagnosis Date    A-fib ([facility]     Cardiomyopathy ([facility] [address] [facility]     EF 45%    Shoulder fracture, left 2014 Surgical repair; ok since         SURGICAL  Past Surgical History:   Procedure Laterality Date    BUNIONECTOMY Bilateral     OTHER SURGICAL HISTORY Right     shoulder and humerous fractures repaired    OTHER SURGICAL HISTORY Bilateral     ear pinning    OTHER SURGICAL HISTORY Left     Ear stapectomy,failed        ALLERIGIES    Hctz [Hydrochloroth*    SHORTNESS OF BREATH  Pcn [Penicillins]       UNKNOWN, SHORTNESS OF BREATH    Comment:DYSPNEA             Since infancy             Couldn't breathe  Lisinopril              SHORTNESS OF BREATH     MEDICATIONS  Current Outpatient Medications   Medication Sig Dispense Refill    losartan 25 MG Oral Tab Take 1 tablet (25 mg total) by mouth daily. ELIQUIS 5 MG Oral Tab TK 1 T PO  BID  5    Metoprolol Succinate ER 50 MG Oral Tablet 24 Hr Take 1 tablet (50 mg total) by mouth daily. SOCIAL HISTORY  Social History     Socioeconomic History    Marital status:    Tobacco Use    Smoking status: Never    Smokeless tobacco: Never   Vaping Use    Vaping Use: Never used   Substance and Sexual Activity    Alcohol use: Yes     Alcohol/week: 2.0 standard drinks of alcohol     Types: 1 Glasses of wine, 1 Cans of beer per week     Comment: Weekend max 2    Drug use: No    Sexual activity: Yes     Partners: Male   Other Topics Concern    Caffeine Concern No    Exercise No   Social History Narrative    The patient does not use an assistive device. .      The patient does live in a home with stairs.         FAMILY HISTORY  Family History   Problem Relation Age of Onset    Heart Disorder Father         Heart valve disease    Cancer Mother 64        ovarian    Ovarian Cancer Mother 64    Breast Cancer Paternal Aunt         46s          PHYSICAL EXAM:     CONSTITUTIONAL: Overall appearance - Normal  HEENT: Normocephalic  EYES: Conjunctiva - Right: Normal, Left: Normal; EOMI  EARS: Inspection - Right: Normal, Left: Normal  NECK/THYROID: Inspection - Normal, Palpation - Normal, Thyroid gland - Normal, No adenopathy  RESPIRATORY: Inspection - Normal, Effort - Normal  CARDIOVASCULAR: Regular rhythm, No murmurs  ABDOMEN: Inspection - Normal, No abdominal tenderness  NEURO: Memory intact  PSYCH: Oriented to person, place, time, and situation, Appropriate mood and affect      Hand Physical Exam:     Right thumb MP tenderness  IP active range 20    X-ray independently interpreted: IP arthritis    ASSESSMENT/PLAN:     TRIGGER DIGIT right thumb      We discussed what a TRIGGER DIGIT is, including treatment options. Questions were answered and the patient wishes to proceed with treatment. I would recommend steroid injection. This has an approximately 50% chance of symptom improvement. If there is little or no relief with the injection, surgery may be necessary. There may be recurrence after injection, necessitating another injection or surgery. The entire digit may be numb for one to two hours after the injection. It may take several days for the injection to take effect. If symptoms persist or triggering is still bothersome, the patient will make an appointment.       She is on Eliquis and needs to be off of it for 3 days preinjection    We will be in contact with Dr. Medhat Beasley, then schedule injection           7/13/2023  Jamison De La Torre MD         +++++++++++++++++++++++++++++++++++++++++      MEDICAL DECISION MAKING    PROBLEMS      MODERATE    (number / complexity)          Undiagnosed new illness with uncertain prognosis, progression    DATA         STRAIGHTFORWARD    (amount / complexity)           MANAGEMENT RISK  MODERATE    (complications/ morbidity)       Steroid injection                  MDM LEVEL    MODERATE

## 2023-07-17 ENCOUNTER — PATIENT MESSAGE (OUTPATIENT)
Dept: SURGERY | Facility: CLINIC | Age: 63
End: 2023-07-17

## 2023-07-17 NOTE — TELEPHONE ENCOUNTER
From: Taiwo Montero  To: Maria Fernanda Lee MD  Sent: 7/17/2023 1:42 PM CDT  Subject: Appt. Hi there! Sorry to bother you, but I talked to Dr. Gamal Landry' s office& they faxed over the release last Friday of going ahead with the injection of cortisone by stopping my Eliquis for 3 days. Gabbi Barton I have a wedding next week & would love for someone to call me back to schedule the Appointment Asap. Gabbi Barton Thank you in advance. ..

## 2023-07-17 NOTE — TELEPHONE ENCOUNTER
Spoke with pt made aware received anticoagulant clearance from Dr. Asif mccurdy to hold Eliquis 3 days prior to steroid injection to right thumb. Pt instructed to hold Eliquis starting 7/28 3 days prior to scheduled injection appointment 7/31/23. Dr. Effie Rangel notified.

## 2023-07-19 ENCOUNTER — OFFICE VISIT (OUTPATIENT)
Dept: INTERNAL MEDICINE CLINIC | Facility: CLINIC | Age: 63
End: 2023-07-19

## 2023-07-19 VITALS
SYSTOLIC BLOOD PRESSURE: 108 MMHG | RESPIRATION RATE: 18 BRPM | HEIGHT: 63.25 IN | DIASTOLIC BLOOD PRESSURE: 62 MMHG | BODY MASS INDEX: 23.62 KG/M2 | WEIGHT: 135 LBS | HEART RATE: 82 BPM | TEMPERATURE: 97 F

## 2023-07-19 DIAGNOSIS — Z00.00 ROUTINE PHYSICAL EXAMINATION: Primary | ICD-10-CM

## 2023-07-19 DIAGNOSIS — G43.409 FAMILIAL HEMIPLEGIC MIGRAINE: ICD-10-CM

## 2023-07-19 DIAGNOSIS — I42.0 DILATED CARDIOMYOPATHY (HCC): ICD-10-CM

## 2023-07-19 DIAGNOSIS — I48.91 ATRIAL FIBRILLATION, UNSPECIFIED TYPE (HCC): ICD-10-CM

## 2023-07-19 PROCEDURE — 3074F SYST BP LT 130 MM HG: CPT | Performed by: INTERNAL MEDICINE

## 2023-07-19 PROCEDURE — 99396 PREV VISIT EST AGE 40-64: CPT | Performed by: INTERNAL MEDICINE

## 2023-07-19 PROCEDURE — 3008F BODY MASS INDEX DOCD: CPT | Performed by: INTERNAL MEDICINE

## 2023-07-19 PROCEDURE — 3078F DIAST BP <80 MM HG: CPT | Performed by: INTERNAL MEDICINE

## 2023-07-19 RX ORDER — PREGABALIN 25 MG/1
25 CAPSULE ORAL 2 TIMES DAILY
Qty: 60 CAPSULE | Refills: 5 | Status: SHIPPED | OUTPATIENT
Start: 2023-07-19

## 2023-07-31 ENCOUNTER — OFFICE VISIT (OUTPATIENT)
Dept: SURGERY | Facility: CLINIC | Age: 63
End: 2023-07-31

## 2023-07-31 VITALS — HEART RATE: 77 BPM | DIASTOLIC BLOOD PRESSURE: 73 MMHG | SYSTOLIC BLOOD PRESSURE: 115 MMHG | RESPIRATION RATE: 18 BRPM

## 2023-07-31 DIAGNOSIS — M65.311 TRIGGER THUMB OF RIGHT HAND: Primary | ICD-10-CM

## 2023-07-31 RX ORDER — BETAMETHASONE SODIUM PHOSPHATE AND BETAMETHASONE ACETATE 3; 3 MG/ML; MG/ML
6 INJECTION, SUSPENSION INTRA-ARTICULAR; INTRALESIONAL; INTRAMUSCULAR; SOFT TISSUE ONCE
Status: COMPLETED | OUTPATIENT
Start: 2023-07-31 | End: 2023-07-31

## 2023-07-31 RX ADMIN — BETAMETHASONE SODIUM PHOSPHATE AND BETAMETHASONE ACETATE 6 MG: 3; 3 INJECTION, SUSPENSION INTRA-ARTICULAR; INTRALESIONAL; INTRAMUSCULAR; SOFT TISSUE at 15:52:00

## 2023-07-31 NOTE — PROGRESS NOTES
After evaluation by Dr. Rawleigh Boeck, orders verified for 2cc celestone injection to right thumb  1cc/6mg Betamethasone and 1cc/10mg 1% Lidocaine drawn up into one syringe for injection  Consent obtained and witnessed, and time-out performed by RN. Pt's vitals and pain score pre-and post-injection recorded in vitals section. Pt reclined in exam chair and armboard placed into position for injection  Pt tolerated procedure well and band-aid applied. Pt exam chair returned to sitting position and pt left office in satisfactory condition. Pt will call in one month if pain continues or worsens. Pt instructed to call the office w/any further questions and/or concerns.

## 2023-07-31 NOTE — PROGRESS NOTES
Here for injection trigger right thumb  Last dose Eliquis 7/27/23  Complains of pain 8/10    Off Eliquis four days, 7/27    INJECTION PROCEDURE NOTE     Procedure performed by: Abdoul Ang MD    Date: 7/31/2023    Procedural time-out required/obtained? Yes    PROCEDURE DETAILS:  Informed consent was obtained. Questions answered and the patient wishes to proceed with treatment. The areas were cleaned using aseptic technique. Location: right thumb  Injected with: Celestone 6 mg/Lidocaine 1% injection (2cc total)  Estimated blood loss: None  The patient tolerated the procedure well. Plan: Follow up as directed.

## 2023-10-12 ENCOUNTER — OFFICE VISIT (OUTPATIENT)
Dept: NEUROLOGY | Facility: CLINIC | Age: 63
End: 2023-10-12
Payer: COMMERCIAL

## 2023-10-12 ENCOUNTER — LAB ENCOUNTER (OUTPATIENT)
Dept: LAB | Facility: HOSPITAL | Age: 63
End: 2023-10-12
Attending: Other
Payer: COMMERCIAL

## 2023-10-12 VITALS — HEART RATE: 73 BPM | DIASTOLIC BLOOD PRESSURE: 71 MMHG | SYSTOLIC BLOOD PRESSURE: 135 MMHG

## 2023-10-12 DIAGNOSIS — R20.0 SENSORY LOSS: ICD-10-CM

## 2023-10-12 DIAGNOSIS — M79.2 NEUROPATHIC PAIN OF RIGHT LOWER EXTREMITY: ICD-10-CM

## 2023-10-12 PROCEDURE — 99417 PROLNG OP E/M EACH 15 MIN: CPT | Performed by: OTHER

## 2023-10-12 PROCEDURE — 99215 OFFICE O/P EST HI 40 MIN: CPT | Performed by: OTHER

## 2023-10-12 PROCEDURE — 84207 ASSAY OF VITAMIN B-6: CPT | Performed by: OTHER

## 2023-10-12 PROCEDURE — 3075F SYST BP GE 130 - 139MM HG: CPT | Performed by: OTHER

## 2023-10-12 PROCEDURE — 3078F DIAST BP <80 MM HG: CPT | Performed by: OTHER

## 2023-10-12 RX ORDER — ALPHA LIPOIC ACID 150 MG
1 CAPSULE ORAL DAILY
Qty: 180 CAPSULE | Refills: 0 | Status: SHIPPED | OUTPATIENT
Start: 2023-10-12 | End: 2024-04-09

## 2023-10-17 LAB — VITAMIN B6: 18.1 UG/L

## 2023-11-03 ENCOUNTER — TELEPHONE (OUTPATIENT)
Dept: NEUROLOGY | Facility: CLINIC | Age: 63
End: 2023-11-03

## 2023-11-03 ENCOUNTER — PATIENT MESSAGE (OUTPATIENT)
Dept: NEUROLOGY | Facility: CLINIC | Age: 63
End: 2023-11-03

## 2023-11-03 NOTE — TELEPHONE ENCOUNTER
Patient called to say only one of her MRIs' was approved and she said   \"Yoly\" told her to call her directly if she had any trouble getting these  MRIs done. I suggested she try calling her Insurance but she refused and said only Saman Beverly could help her.

## 2023-11-03 NOTE — TELEPHONE ENCOUNTER
Phone call returned to the pt. Pt would like to know why her MRI of the Abdomen is not approved and her insurance company advised her that she will need additional information for the MRI.

## 2023-11-03 NOTE — TELEPHONE ENCOUNTER
From: Chelly Talavera  To: Randal Chen  Sent: 11/3/2023 8:22 AM CDT  Subject: Skin punch Biopsy, Mri    Fyi. Darron Ruiz I tried scheduling Skin punch Biopsy . .they have no record or order. Darron Ruiz Also, I'm not approved for abdomen. ..which on my order it says, Not Abdomen. ...it says lumbrocal? It's so confusing. .. Also, I never received my Vitamin B6 results? ? Please advise

## 2023-11-16 ENCOUNTER — APPOINTMENT (OUTPATIENT)
Dept: URBAN - METROPOLITAN AREA CLINIC 244 | Age: 63
Setting detail: DERMATOLOGY
End: 2023-11-16

## 2023-11-16 DIAGNOSIS — D485 NEOPLASM OF UNCERTAIN BEHAVIOR OF SKIN: ICD-10-CM

## 2023-11-16 DIAGNOSIS — G90.09 OTHER IDIOPATHIC PERIPHERAL AUTONOMIC NEUROPATHY: ICD-10-CM

## 2023-11-16 PROBLEM — D48.5 NEOPLASM OF UNCERTAIN BEHAVIOR OF SKIN: Status: ACTIVE | Noted: 2023-11-16

## 2023-11-16 PROCEDURE — OTHER BIOPSY BY PUNCH METHOD: OTHER

## 2023-11-16 PROCEDURE — OTHER ADDITIONAL NOTES: OTHER

## 2023-11-16 PROCEDURE — 99202 OFFICE O/P NEW SF 15 MIN: CPT

## 2023-11-16 PROCEDURE — OTHER COUNSELING: OTHER

## 2023-11-16 ASSESSMENT — LOCATION DETAILED DESCRIPTION DERM
LOCATION DETAILED: LEFT ANKLE
LOCATION DETAILED: LEFT LATERAL DISTAL PRETIBIAL REGION
LOCATION DETAILED: LEFT ANTERIOR MEDIAL MALLEOLUS
LOCATION DETAILED: LEFT MEDIAL DISTAL PRETIBIAL REGION
LOCATION DETAILED: RIGHT ANKLE

## 2023-11-16 ASSESSMENT — LOCATION ZONE DERM: LOCATION ZONE: LEG

## 2023-11-16 ASSESSMENT — LOCATION SIMPLE DESCRIPTION DERM
LOCATION SIMPLE: LEFT PRETIBIAL REGION
LOCATION SIMPLE: LEFT ANKLE
LOCATION SIMPLE: RIGHT ANKLE

## 2023-11-16 NOTE — PROCEDURE: ADDITIONAL NOTES
Render Risk Assessment In Note?: no
Additional Notes: Requested to obtain biopsy for small nerve fiber neuropathy.  Will have to contact a university - will try [facility] - to get proper media for biopsy and shipping instructions, insurance approval, etc
Detail Level: Simple
Additional Notes: Biopsies performed but placed in wrong media so never sent to the lab.  Will hold/stop billing for the biopsies and have patient back for biopsy(s) when we have the proper media for the issue

## 2023-11-30 ENCOUNTER — APPOINTMENT (OUTPATIENT)
Dept: URBAN - METROPOLITAN AREA CLINIC 244 | Age: 63
Setting detail: DERMATOLOGY
End: 2023-11-30

## 2023-11-30 DIAGNOSIS — Z48.02 ENCOUNTER FOR REMOVAL OF SUTURES: ICD-10-CM

## 2023-11-30 PROCEDURE — OTHER SUTURE REMOVAL (GLOBAL PERIOD): OTHER

## 2023-11-30 PROCEDURE — 99024 POSTOP FOLLOW-UP VISIT: CPT

## 2023-11-30 PROCEDURE — OTHER ADDITIONAL NOTES: OTHER

## 2023-11-30 ASSESSMENT — LOCATION ZONE DERM: LOCATION ZONE: LEG

## 2023-11-30 ASSESSMENT — LOCATION SIMPLE DESCRIPTION DERM
LOCATION SIMPLE: LEFT PRETIBIAL REGION
LOCATION SIMPLE: LEFT ANKLE

## 2023-11-30 ASSESSMENT — LOCATION DETAILED DESCRIPTION DERM
LOCATION DETAILED: LEFT MEDIAL DISTAL PRETIBIAL REGION
LOCATION DETAILED: LEFT LATERAL DISTAL PRETIBIAL REGION
LOCATION DETAILED: LEFT ANTERIOR MEDIAL MALLEOLUS

## 2023-11-30 NOTE — PROCEDURE: SUTURE REMOVAL (GLOBAL PERIOD)
Detail Level: Detailed
Add 68270 Cpt? (Important Note: In 2017 The Use Of 10566 Is Being Tracked By Cms To Determine Future Global Period Reimbursement For Global Periods): yes

## 2023-11-30 NOTE — PROCEDURE: ADDITIONAL NOTES
Render Risk Assessment In Note?: no
Detail Level: Simple
Additional Notes: Informed pt we need a specific type of biopsy kit that needs to be ordered and delivered to us.\\nWhen we received the biopsy kit, will call patient to come back into the office to have three biopsies redone so it can be sent same-day. Since the biopsies are gonna go to [facility] that is out of state, the patient’s insurance will be covered differently but for the next appointment for the three biopsies will be at no charge per Dr. Cat.

## 2023-12-05 ENCOUNTER — TELEPHONE (OUTPATIENT)
Dept: NEUROLOGY | Facility: CLINIC | Age: 63
End: 2023-12-05

## 2023-12-05 DIAGNOSIS — R20.0 SENSORY LOSS: ICD-10-CM

## 2023-12-05 DIAGNOSIS — M79.2 NEUROPATHIC PAIN OF RIGHT LOWER EXTREMITY: Primary | ICD-10-CM

## 2023-12-05 NOTE — TELEPHONE ENCOUNTER
Phone call received from [facility]. Technician states that they would prefer to have an MRI of the pelvis order to look at the lumbosacral plexus. Order verified with [facility] technician and Dr. Matilde Tracy. Order faxed to [facility]. Done.

## 2023-12-07 ENCOUNTER — PATIENT MESSAGE (OUTPATIENT)
Dept: INTERNAL MEDICINE CLINIC | Facility: CLINIC | Age: 63
End: 2023-12-07

## 2023-12-08 NOTE — TELEPHONE ENCOUNTER
Dr. Gisel Thorpe, see results from MRI Pelvis. IMPRESSION:   1. Normal-appearing lumbosacral plexus. No nerve root impingement or mass. 2.Uterine mass likely representing a fibroid measuring 9.2 x 8.3 x 8.6 cm. Interpreting Radiologist:     Mahogany Aguero M.D.    Electronically Signed: 12/05/2023 02:49 PM

## 2023-12-08 NOTE — TELEPHONE ENCOUNTER
From: Nori Due  To: Foreign Ojeda  Sent: 12/7/2023 11:13 AM CST  Subject: 9.8cm uterine mass    Please look at my chart & let me know my next step for a specialist or doctor referral please. Petey Sommer MRI tests done for Dr. Sajan Franks. Petey Sommer Thank you

## 2023-12-22 ENCOUNTER — OFFICE VISIT (OUTPATIENT)
Dept: NEUROLOGY | Facility: CLINIC | Age: 63
End: 2023-12-22
Payer: COMMERCIAL

## 2023-12-22 ENCOUNTER — LAB ENCOUNTER (OUTPATIENT)
Dept: LAB | Facility: HOSPITAL | Age: 63
End: 2023-12-22
Attending: Other
Payer: COMMERCIAL

## 2023-12-22 VITALS
DIASTOLIC BLOOD PRESSURE: 75 MMHG | WEIGHT: 135 LBS | BODY MASS INDEX: 23.92 KG/M2 | HEART RATE: 99 BPM | HEIGHT: 63 IN | SYSTOLIC BLOOD PRESSURE: 120 MMHG

## 2023-12-22 DIAGNOSIS — M79.2 NEUROPATHIC PAIN OF RIGHT LOWER EXTREMITY: Primary | ICD-10-CM

## 2023-12-22 DIAGNOSIS — R51.9 NEW ONSET OF HEADACHES AFTER AGE 50: ICD-10-CM

## 2023-12-22 LAB
CRP SERPL-MCNC: <0.4 MG/DL (ref ?–1)
ERYTHROCYTE [SEDIMENTATION RATE] IN BLOOD: 7 MM/HR

## 2023-12-22 PROCEDURE — 3074F SYST BP LT 130 MM HG: CPT | Performed by: OTHER

## 2023-12-22 PROCEDURE — 86140 C-REACTIVE PROTEIN: CPT | Performed by: OTHER

## 2023-12-22 PROCEDURE — 3008F BODY MASS INDEX DOCD: CPT | Performed by: OTHER

## 2023-12-22 PROCEDURE — 85652 RBC SED RATE AUTOMATED: CPT | Performed by: OTHER

## 2023-12-22 PROCEDURE — 3078F DIAST BP <80 MM HG: CPT | Performed by: OTHER

## 2023-12-22 PROCEDURE — 99215 OFFICE O/P EST HI 40 MIN: CPT | Performed by: OTHER

## 2023-12-22 RX ORDER — DULOXETIN HYDROCHLORIDE 30 MG/1
CAPSULE, DELAYED RELEASE ORAL
Qty: 120 CAPSULE | Refills: 0 | Status: SHIPPED | OUTPATIENT
Start: 2023-12-22 | End: 2024-03-21

## 2024-01-03 ENCOUNTER — OFFICE VISIT (OUTPATIENT)
Dept: OBGYN CLINIC | Facility: CLINIC | Age: 64
End: 2024-01-03

## 2024-01-03 VITALS
HEIGHT: 63 IN | BODY MASS INDEX: 23.04 KG/M2 | SYSTOLIC BLOOD PRESSURE: 106 MMHG | WEIGHT: 130 LBS | HEART RATE: 75 BPM | DIASTOLIC BLOOD PRESSURE: 74 MMHG

## 2024-01-03 DIAGNOSIS — D25.2 SUBSEROUS LEIOMYOMA OF UTERUS: Primary | ICD-10-CM

## 2024-01-03 DIAGNOSIS — Z12.4 ENCOUNTER FOR SCREENING FOR CERVICAL CANCER: ICD-10-CM

## 2024-01-03 DIAGNOSIS — Z78.0 MENOPAUSE: ICD-10-CM

## 2024-01-03 PROCEDURE — 3078F DIAST BP <80 MM HG: CPT | Performed by: OBSTETRICS & GYNECOLOGY

## 2024-01-03 PROCEDURE — 3008F BODY MASS INDEX DOCD: CPT | Performed by: OBSTETRICS & GYNECOLOGY

## 2024-01-03 PROCEDURE — 99203 OFFICE O/P NEW LOW 30 MIN: CPT | Performed by: OBSTETRICS & GYNECOLOGY

## 2024-01-03 PROCEDURE — 3074F SYST BP LT 130 MM HG: CPT | Performed by: OBSTETRICS & GYNECOLOGY

## 2024-01-03 RX ORDER — LOSARTAN POTASSIUM 50 MG/1
50 TABLET ORAL DAILY
COMMUNITY
Start: 2023-12-17

## 2024-01-03 NOTE — PROGRESS NOTES
HPI:  The patient is a 64 yo F here for discussion regarding MRI.  Pt had MRI for low back pain.  MRI results below.  Of note, known uterine fibroid . Last US available on chart in ---fibroid 7.2 x 6 x 7cm. Pt denies PMB or pelvic pain with IC.  No abn dc.  Family h/o ovarian CA        Exam: MRI PELVIS WW/O CONTRAST   CPT Code(s): 00380,GDV75,GDV75 - MRI PELVIS W/O  and  W/DYE,Gadavist 7.5 ML Vial,Gadavist 7.5 ML Vial     MRI  PELVIS WITH AND WITHOUT CONTRAST     HISTORY: Neuralgia and neuritis, unspecified     COMPARISON: None currently available.     TECHNIQUE: Routine MRI was performed on a wide bore ultra high field 3.0 T Siemens Skyra MR scanner. 5 mL Gadavist     FINDINGS:   Uterine mass measuring 9.2 x 8.3 x 8.6 cm. The ovaries are normal in appearance. Urinary bladder normal contour and wall thickness. No free fluid in the pelvis. Visualized bowel has a normal appearance.     The lumbosacral plexus has a normal appearance. No nerve root impingement or mass.     Low-grade hip joint degenerative arthropathy. Mild SI joint arthropathy. Normal marrow signal. Bilateral symmetric normal-appearing musculature.     IMPRESSION:   1.Normal-appearing lumbosacral plexus. No nerve root impingement or mass.   2.Uterine mass likely representing a fibroid measuring 9.2 x 8.3 x 8.6 cm.     Interpreting Radiologist:     Dontae Luna M.D.   Electronically Signed: 2023 02:49 PM     Reviewed medical and surgical history below       OBSTETRICS HISTORY:  OB History    Para Term  AB Living   0 0 0 0 0 0   SAB IAB Ectopic Multiple Live Births   0 0 0 0 0       GYNE HISTORY:  History   Sexual Activity    Sexual activity: Yes    Partners: Male            MEDICAL HISTORY:  Past Medical History:   Diagnosis Date    A-fib (HCC)     Cardiomyopathy (HCC)     EF 45%    Heart failure (HCC)     Shoulder fracture, left     Surgical repair; ok since        SURGICAL HISTORY:  Past Surgical History:   Procedure  Laterality Date    BUNIONECTOMY Bilateral     OTHER SURGICAL HISTORY Right     shoulder and humerous fractures repaired    OTHER SURGICAL HISTORY Bilateral     ear pinning    OTHER SURGICAL HISTORY Left     Ear stapectomy,failed       SOCIAL HISTORY:  Social History     Socioeconomic History    Marital status:      Spouse name: Not on file    Number of children: Not on file    Years of education: Not on file    Highest education level: Not on file   Occupational History    Not on file   Tobacco Use    Smoking status: Never    Smokeless tobacco: Never   Vaping Use    Vaping Use: Never used   Substance and Sexual Activity    Alcohol use: Yes     Alcohol/week: 2.0 standard drinks of alcohol     Types: 1 Glasses of wine, 1 Cans of beer per week     Comment: Weekend max 2    Drug use: No    Sexual activity: Yes     Partners: Male   Other Topics Concern    Caffeine Concern No    Exercise No    Seat Belt Not Asked    Special Diet Not Asked    Stress Concern Not Asked    Weight Concern Not Asked    Left Handed Not Asked    Right Handed Yes    Currently spends a great deal of time in the sun Not Asked    Past Sunlamp Treatments for Acne Not Asked    History of tanning Not Asked    Hx of Spending Great Deal of Time in Sun Not Asked    Bad sunburns in the past Not Asked    Tanning Salons in the Past Not Asked    Hx of Radiation Treatments Not Asked    Regular use of sun block Not Asked   Social History Narrative    The patient does not use an assistive device..      The patient does live in a home with stairs.     Social Determinants of Health     Financial Resource Strain: Not on file   Food Insecurity: Not on file   Transportation Needs: Not on file   Physical Activity: Not on file   Stress: Not on file   Social Connections: Not on file   Housing Stability: Not on file       FAMILY HISTORY:  Family History   Problem Relation Age of Onset    Heart Disorder Father         Heart valve disease    Cancer Mother 61         ovarian    Ovarian Cancer Mother 61    Breast Cancer Paternal Aunt         50s       MEDICATIONS:    Current Outpatient Medications:     losartan 50 MG Oral Tab, Take 1 tablet (50 mg total) by mouth daily., Disp: , Rfl:     DULoxetine 30 MG Oral Cap DR Particles, Take 1 capsule (30 mg total) by mouth daily for 60 days, THEN 2 capsules (60 mg total) daily., Disp: 120 capsule, Rfl: 0    Lipoic Acid 150 MG Oral Cap, Take 1 capsule by mouth daily., Disp: 180 capsule, Rfl: 0    ELIQUIS 5 MG Oral Tab, TK 1 T PO  BID, Disp: , Rfl: 5    Metoprolol Succinate ER 50 MG Oral Tablet 24 Hr, Take 1 tablet (50 mg total) by mouth daily., Disp: , Rfl:     ALLERGIES:    Allergies   Allergen Reactions    Hctz [Hydrochlorothiazide] SHORTNESS OF BREATH    Pcn [Penicillins] UNKNOWN and SHORTNESS OF BREATH     DYSPNEA  Since infancy  Couldn't breathe    Lisinopril SHORTNESS OF BREATH         Review of Systems:  Constitutional:  Denies fevers and chills   Cardiovascular:  denies chest pain or palpitations  Respiratory:  denies shortness of breath  Gastrointestinal:  denies heartburn, abdominal pain, diarrhea or constipation  Genitourinary:  denies dysuria, incontinence, abnormal vaginal discharge, vaginal itching  Musculoskeletal:  denies back pain.  Skin/Breast:  Denies any breast pain, lumps, or discharge.   Neurological:  denies headaches, extremity weakness  Psychiatric: denies depression or anxiety.      Vitals:    01/03/24 0915   BP: 106/74   Pulse: 75       PHYSICAL EXAM:   Constitutional: well developed, well nourished  Head/Face: normocephalic  Abdomen:  soft, nontender, nondistended, no masses  Psychiatric: Appropriate mood and affect    Pelvic Exam:  External Genitalia: normal appearance, hair distribution, and no lesions  Urethral Meatus:  normal in size, location, without lesions and prolapse  Bladder:  No fullness, masses or tenderness  Vagina:  Normal appearance without lesions, no abnormal discharge  Cervix:  Normal without  tenderness on motion  Uterus: 14 wk sized non tender uterus  Adnexa: normal without masses or tenderness  Perineum: normal    Assessment/Plan:    Kirstin was seen today for gyn exam.    Diagnoses and all orders for this visit:    Subserous leiomyoma of uterus  -     Cancel: US PELVIS W EV (CPT=76856/71802); Future  -     US PELVIS W EV (CPT=76856/92670); Future    Menopause  -     Cancel: US PELVIS W EV (CPT=76856/60236); Future  -     US PELVIS W EV (CPT=76856/19648); Future    Encounter for screening for cervical cancer      64 yo F who presents to discuss MRI report.  In review, fibroid 9.2cm which is grown from 2015, although imaging modality is different.  Pt is asymptomatic, however we did discuss it is not normal for fibroids to grow in PMF.  Plan for US so we can compare fibroid size in similar imaging modalities.  I did explain that she may need GYN/ONC consult to discuss if hysterectomy would be warranted given slight growth over 8 years.  Once imaging resulted, will contact with recs. All questions answered; pt voices understanding

## 2024-01-04 LAB — HPV I/H RISK 1 DNA SPEC QL NAA+PROBE: NEGATIVE

## 2024-01-11 NOTE — H&P
The above referenced H&P was reviewed by Julian Macdonald MD on 1/18/2024, the patient was examined and no significant changes have occurred in the patient's condition since the H&P was performed.  Risks and benefits were discussed, proceed with procedure as planned.

## 2024-01-16 ENCOUNTER — LAB ENCOUNTER (OUTPATIENT)
Dept: LAB | Facility: HOSPITAL | Age: 64
End: 2024-01-16
Attending: NURSE PRACTITIONER
Payer: COMMERCIAL

## 2024-01-16 ENCOUNTER — HOSPITAL ENCOUNTER (OUTPATIENT)
Dept: GENERAL RADIOLOGY | Facility: HOSPITAL | Age: 64
Discharge: HOME | End: 2024-01-16
Attending: NURSE PRACTITIONER
Payer: COMMERCIAL

## 2024-01-16 ENCOUNTER — HOSPITAL ENCOUNTER (OUTPATIENT)
Dept: ULTRASOUND IMAGING | Age: 64
Discharge: HOME | End: 2024-01-16
Attending: OBSTETRICS & GYNECOLOGY
Payer: COMMERCIAL

## 2024-01-16 DIAGNOSIS — I10 HYPERTENSION: ICD-10-CM

## 2024-01-16 DIAGNOSIS — Z01.818 PREOP TESTING: Primary | ICD-10-CM

## 2024-01-16 DIAGNOSIS — D25.2 SUBSEROUS LEIOMYOMA OF UTERUS: ICD-10-CM

## 2024-01-16 DIAGNOSIS — I42.8 OTHER CARDIOMYOPATHY (HCC): ICD-10-CM

## 2024-01-16 DIAGNOSIS — I48.20 CHRONIC ATRIAL FIBRILLATION (HCC): ICD-10-CM

## 2024-01-16 DIAGNOSIS — Z78.0 MENOPAUSE: ICD-10-CM

## 2024-01-16 DIAGNOSIS — I48.20 ATRIAL FIBRILLATION, CHRONIC (HCC): ICD-10-CM

## 2024-01-16 DIAGNOSIS — I50.23 CHF NYHA CLASS II, ACUTE ON CHRONIC, SYSTOLIC (HCC): ICD-10-CM

## 2024-01-16 DIAGNOSIS — Z79.01 LONG TERM CURRENT USE OF ANTICOAGULANT: ICD-10-CM

## 2024-01-16 DIAGNOSIS — I10 PRIMARY HYPERTENSION: ICD-10-CM

## 2024-01-16 DIAGNOSIS — Z01.818 PRE-OP TESTING: ICD-10-CM

## 2024-01-16 LAB
ALBUMIN SERPL-MCNC: 4.5 G/DL (ref 3.2–4.8)
ALBUMIN/GLOB SERPL: 1.5 {RATIO} (ref 1–2)
ALP LIVER SERPL-CCNC: 82 U/L
ALT SERPL-CCNC: 15 U/L
ANION GAP SERPL CALC-SCNC: 6 MMOL/L (ref 0–18)
AST SERPL-CCNC: 22 U/L (ref ?–34)
BASOPHILS # BLD AUTO: 0.02 X10(3) UL (ref 0–0.2)
BASOPHILS NFR BLD AUTO: 0.3 %
BILIRUB SERPL-MCNC: 1.3 MG/DL (ref 0.2–1.1)
BUN BLD-MCNC: 15 MG/DL (ref 9–23)
BUN/CREAT SERPL: 18.8 (ref 10–20)
CALCIUM BLD-MCNC: 9.5 MG/DL (ref 8.7–10.4)
CHLORIDE SERPL-SCNC: 104 MMOL/L (ref 98–112)
CO2 SERPL-SCNC: 28 MMOL/L (ref 21–32)
CREAT BLD-MCNC: 0.8 MG/DL
DEPRECATED RDW RBC AUTO: 44.4 FL (ref 35.1–46.3)
EGFRCR SERPLBLD CKD-EPI 2021: 84 ML/MIN/1.73M2 (ref 60–?)
EOSINOPHIL # BLD AUTO: 0.16 X10(3) UL (ref 0–0.7)
EOSINOPHIL NFR BLD AUTO: 2.2 %
ERYTHROCYTE [DISTWIDTH] IN BLOOD BY AUTOMATED COUNT: 12.8 % (ref 11–15)
FASTING STATUS PATIENT QL REPORTED: YES
GLOBULIN PLAS-MCNC: 3.1 G/DL (ref 2.8–4.4)
GLUCOSE BLD-MCNC: 99 MG/DL (ref 70–99)
HCT VFR BLD AUTO: 47.9 %
HGB BLD-MCNC: 15.1 G/DL
IMM GRANULOCYTES # BLD AUTO: 0.01 X10(3) UL (ref 0–1)
IMM GRANULOCYTES NFR BLD: 0.1 %
LYMPHOCYTES # BLD AUTO: 3.02 X10(3) UL (ref 1–4)
LYMPHOCYTES NFR BLD AUTO: 41 %
MCH RBC QN AUTO: 29.7 PG (ref 26–34)
MCHC RBC AUTO-ENTMCNC: 31.5 G/DL (ref 31–37)
MCV RBC AUTO: 94.3 FL
MONOCYTES # BLD AUTO: 0.45 X10(3) UL (ref 0.1–1)
MONOCYTES NFR BLD AUTO: 6.1 %
NEUTROPHILS # BLD AUTO: 3.7 X10 (3) UL (ref 1.5–7.7)
NEUTROPHILS # BLD AUTO: 3.7 X10(3) UL (ref 1.5–7.7)
NEUTROPHILS NFR BLD AUTO: 50.3 %
OSMOLALITY SERPL CALC.SUM OF ELEC: 287 MOSM/KG (ref 275–295)
PLATELET # BLD AUTO: 233 10(3)UL (ref 150–450)
POTASSIUM SERPL-SCNC: 4 MMOL/L (ref 3.5–5.1)
PROT SERPL-MCNC: 7.6 G/DL (ref 5.7–8.2)
RBC # BLD AUTO: 5.08 X10(6)UL
SODIUM SERPL-SCNC: 138 MMOL/L (ref 136–145)
WBC # BLD AUTO: 7.4 X10(3) UL (ref 4–11)

## 2024-01-16 PROCEDURE — 36415 COLL VENOUS BLD VENIPUNCTURE: CPT

## 2024-01-16 PROCEDURE — 71046 X-RAY EXAM CHEST 2 VIEWS: CPT | Performed by: NURSE PRACTITIONER

## 2024-01-16 PROCEDURE — 85025 COMPLETE CBC W/AUTO DIFF WBC: CPT

## 2024-01-16 PROCEDURE — 76856 US EXAM PELVIC COMPLETE: CPT | Performed by: OBSTETRICS & GYNECOLOGY

## 2024-01-16 PROCEDURE — 80053 COMPREHEN METABOLIC PANEL: CPT

## 2024-01-16 PROCEDURE — 76830 TRANSVAGINAL US NON-OB: CPT | Performed by: OBSTETRICS & GYNECOLOGY

## 2024-01-18 ENCOUNTER — HOSPITAL ENCOUNTER (OUTPATIENT)
Dept: INTERVENTIONAL RADIOLOGY/VASCULAR | Facility: HOSPITAL | Age: 64
Discharge: HOME | End: 2024-01-18
Attending: INTERNAL MEDICINE | Admitting: INTERNAL MEDICINE
Payer: COMMERCIAL

## 2024-01-18 VITALS
TEMPERATURE: 98 F | OXYGEN SATURATION: 97 % | RESPIRATION RATE: 20 BRPM | HEART RATE: 69 BPM | WEIGHT: 129 LBS | HEIGHT: 63.25 IN | BODY MASS INDEX: 22.57 KG/M2 | DIASTOLIC BLOOD PRESSURE: 84 MMHG | SYSTOLIC BLOOD PRESSURE: 104 MMHG

## 2024-01-18 DIAGNOSIS — I42.9 CARDIOMYOPATHY (HCC): ICD-10-CM

## 2024-01-18 DIAGNOSIS — R94.39 ABNORMAL STRESS TEST: ICD-10-CM

## 2024-01-18 LAB — ISTAT ACTIVATED CLOTTING TIME: 217 SECONDS (ref 125–137)

## 2024-01-18 PROCEDURE — 85347 COAGULATION TIME ACTIVATED: CPT

## 2024-01-18 PROCEDURE — 027034Z DILATION OF CORONARY ARTERY, ONE ARTERY WITH DRUG-ELUTING INTRALUMINAL DEVICE, PERCUTANEOUS APPROACH: ICD-10-PCS | Performed by: INTERNAL MEDICINE

## 2024-01-18 PROCEDURE — 93454 CORONARY ARTERY ANGIO S&I: CPT | Performed by: INTERNAL MEDICINE

## 2024-01-18 PROCEDURE — 93799 UNLISTED CV SVC/PROCEDURE: CPT | Performed by: INTERNAL MEDICINE

## 2024-01-18 PROCEDURE — 99152 MOD SED SAME PHYS/QHP 5/>YRS: CPT | Performed by: INTERNAL MEDICINE

## 2024-01-18 PROCEDURE — 4A033BC MEASUREMENT OF ARTERIAL PRESSURE, CORONARY, PERCUTANEOUS APPROACH: ICD-10-PCS | Performed by: INTERNAL MEDICINE

## 2024-01-18 PROCEDURE — 36415 COLL VENOUS BLD VENIPUNCTURE: CPT

## 2024-01-18 PROCEDURE — B2111ZZ FLUOROSCOPY OF MULTIPLE CORONARY ARTERIES USING LOW OSMOLAR CONTRAST: ICD-10-PCS | Performed by: INTERNAL MEDICINE

## 2024-01-18 RX ORDER — ASPIRIN 81 MG/1
81 TABLET ORAL DAILY
Status: DISCONTINUED | OUTPATIENT
Start: 2024-01-19 | End: 2024-01-18

## 2024-01-18 RX ORDER — SODIUM CHLORIDE 9 MG/ML
INJECTION, SOLUTION INTRAVENOUS CONTINUOUS
Status: ACTIVE | OUTPATIENT
Start: 2024-01-18 | End: 2024-01-18

## 2024-01-18 RX ORDER — ASPIRIN 81 MG/1
324 TABLET, CHEWABLE ORAL ONCE
Status: COMPLETED | OUTPATIENT
Start: 2024-01-18 | End: 2024-01-18

## 2024-01-18 RX ORDER — ATORVASTATIN CALCIUM 40 MG/1
40 TABLET, FILM COATED ORAL NIGHTLY
Qty: 90 TABLET | Refills: 3 | Status: SHIPPED | OUTPATIENT
Start: 2024-01-18

## 2024-01-18 RX ORDER — SODIUM CHLORIDE 9 MG/ML
3 INJECTION, SOLUTION INTRAVENOUS
Status: COMPLETED | OUTPATIENT
Start: 2024-01-18 | End: 2024-01-18

## 2024-01-18 RX ORDER — CLOPIDOGREL BISULFATE 75 MG/1
75 TABLET ORAL DAILY
Status: DISCONTINUED | OUTPATIENT
Start: 2024-01-19 | End: 2024-01-18

## 2024-01-18 RX ORDER — NITROGLYCERIN 20 MG/100ML
INJECTION INTRAVENOUS
Status: COMPLETED
Start: 2024-01-18 | End: 2024-01-18

## 2024-01-18 RX ORDER — VERAPAMIL HYDROCHLORIDE 2.5 MG/ML
INJECTION, SOLUTION INTRAVENOUS
Status: COMPLETED
Start: 2024-01-18 | End: 2024-01-18

## 2024-01-18 RX ORDER — LIDOCAINE HYDROCHLORIDE 20 MG/ML
INJECTION, SOLUTION EPIDURAL; INFILTRATION; INTRACAUDAL; PERINEURAL
Status: COMPLETED
Start: 2024-01-18 | End: 2024-01-18

## 2024-01-18 RX ORDER — CLOPIDOGREL BISULFATE 75 MG/1
TABLET ORAL
Status: COMPLETED
Start: 2024-01-18 | End: 2024-01-18

## 2024-01-18 RX ORDER — ASPIRIN 81 MG/1
TABLET, CHEWABLE ORAL
Status: COMPLETED
Start: 2024-01-18 | End: 2024-01-18

## 2024-01-18 RX ORDER — HEPARIN SODIUM 1000 [USP'U]/ML
INJECTION, SOLUTION INTRAVENOUS; SUBCUTANEOUS
Status: COMPLETED
Start: 2024-01-18 | End: 2024-01-18

## 2024-01-18 RX ORDER — CLOPIDOGREL BISULFATE 75 MG/1
75 TABLET ORAL DAILY
Qty: 90 TABLET | Refills: 3 | Status: SHIPPED | OUTPATIENT
Start: 2024-01-19

## 2024-01-18 RX ORDER — MIDAZOLAM HYDROCHLORIDE 1 MG/ML
INJECTION INTRAMUSCULAR; INTRAVENOUS
Status: COMPLETED
Start: 2024-01-18 | End: 2024-01-18

## 2024-01-18 RX ADMIN — ASPIRIN 324 MG: 81 TABLET, CHEWABLE ORAL at 09:30:00

## 2024-01-18 RX ADMIN — SODIUM CHLORIDE 3 ML/KG/HR: 9 INJECTION, SOLUTION INTRAVENOUS at 09:15:00

## 2024-01-18 NOTE — DIETARY NOTE
NUTRITION EDUCATION NOTE     Received consult for cardiac nutrition education. Verbally reviewed basic cardiac diet restrictions. Provided with Eating Heart-Healthy and NCM handout to reinforce. Pt states she already follows a very heart healthy diet at home. Eats lots of fruits and veggies. Notes she does have family history of heart disease. Receptive to instruction. Expect good compliance.        Elisa Bravo RD, LDN  Clinical Dietitian  P: [phone]

## 2024-01-18 NOTE — DISCHARGE INSTRUCTIONS
HOME CARE INSTRUCTIONS FOLLOWING CORONARY ANGIOGRAPHY, ANGIOPLASTY (PTCA/PTA) OR INSERTION OF STENT IN THE CORONARY    Activity  DO NOT drive after the procedure.  You may resume driving late the following day according to the nurse or physician's instructions  Avoid drinking alcohol for the next 24 hours  Do not make any critical decisions or sign any legal documents for the next 24 hours  Do not lift, pull, or push anything over 10 pounds for 1 week  Avoid wrist flexion, extension, and fine motor activities (i.e. Texting, typing, using a computer mouse, etc.) for 24 hours.  Plan on resting and relaxing tonight and tomorrow  Resume your normal activity after 48 hours, or as instructed by your physician    Special Instructions  Drink plenty of fluids during the next 24 hours to \"flush\" the contrast from your system    Site Care: right wrist    Leave bandage in place for 24 hours. Then, gently wash with soap and water. Do no put any other bandage, ointment, powders, or creams to the site.  For 5 days after the procedure, make sure the wrist is not submerged in water or any liquid.  For local swelling: apply ice  If bleeding occurs, elevate the hand above the heart and apply local pressure      What is Normal?  A small lump at the procedure site associated with mild tenderness when touched  The procedure site may be bruised or discolored  There may be a small amount of drainage on the bandage    When you should NOTIFY YOUR PHYSICIAN  May call answering service at [phone] for any problems or concerns    Bleeding can occur at the procedure site - both on the outside of the skin and/or beneath the surface of the skin  Swelling or a large lump at the procedure site can occur, which may be accompanied by moderate to severe pain    If either of the above occurs, (right groin) lie down flat.  Have someone apply pressure to the procedure site with both hands, as instructed by the nurse.  Hold pressure for 20 minutes and  the bleeding should stop.  Notify your physician of the occurrence  If the bleeding does not stop, call 911 and continue to apply pressure    If either of the above occurs, (right wrist) apply pressure to the procedure site with 2-3 fingers, as instructed by the nurse.  Hold pressure for 20 minutes and the bleeding should stop.  Notify your physician of the occurrence  If the bleeding does not stop, call 911 and continue to apply pressure    If you experience signs of a fever, temperature > 101°, chills, infection (redness, swelling, thick yellow drainage, or a foul odor from the procedure site)  If you notice any numbness, tingling, or loss of feeling to your fingers or hand, if wrist access was utilized    You Received a Stent:    You will remain on an antiplatelet drug and/or aspirin.  Antiplatelet medications are usually taken for six months to one year and should not be stopped unless your cardiologist directs you to do so.  These medications help to prevent blockage at the stent site.  If another physician or dentist asks you to stop your antiplatelet medication, you need to consult your cardiologist first.  Together, your cardiologist and other physician can discuss the risks that may be involved if you are not taking the antiplatelet medication   If an MRI is necessary, it may be done 4-6 weeks after your procedure.  Verify this with your cardiologist  Keep your stent card with you at all times!  If you need an MRI in the future, your stent card will need to be shown to the technologist before performing the MRI.  A duplicate card CANNOT be reproduced.    Other  You may resume your present diet, unless otherwise specified by your physician.  You may resume all of your medications as prescribed, unless otherwise directed by your physician.  A list of your medications was provided to you at discharge.  Continue the walking program initiated in the hospital and progress your walking as directed.  Or, gradually  resume your previous aerobic exercise schedule as tolerated.  Please call your physician’s office for a follow-up appointment.  You should be seen in 7-10 days.

## 2024-01-18 NOTE — IVS NOTE
Hand-Off     Procedure hand off report given to Katalina PIERRE.   Pt's vital signs are stable.   Pt denies any pain or discomfort  Procedural site dry and intact, no bleeding or swelling noted  Activity restriction and bedrest status reviewed.  Stent card given to pt.   Discharge teaching completed with pt and family, denies any questions at this time.

## 2024-01-18 NOTE — CARDIAC REHAB
Cardiac Rehab Phase I    Activity:  Distance   Assistance needed   Patient tolerated activity .    Education:  Handouts provided and reviewed: C[facility] Your [facility] Booklet.       Diet: Healthy Cardiac diet reviewed.    Disease Process: Disease process reviewed.    Reviewed the following      RISK FACTORS: Reviewed      SMOKING CESSATION: Reviewed      HOME EXERCISE ACTIVITY: Reviewed      OUTPATIENT CARDIAC REHAB: Referred to Cardiac Rehabilitation

## 2024-01-19 ENCOUNTER — TELEPHONE (OUTPATIENT)
Dept: OBGYN CLINIC | Facility: CLINIC | Age: 64
End: 2024-01-19

## 2024-01-19 DIAGNOSIS — Z78.0 POST-MENOPAUSAL: ICD-10-CM

## 2024-01-19 DIAGNOSIS — D21.9 FIBROID: Primary | ICD-10-CM

## 2024-01-19 NOTE — IVS NOTE
DISCHARGE NOTE     Pt is able to sit up and ambulate without difficulty.   Pt voided and tolerated fluids and food.   Procedural site remains dry and intact with good circulation, motion, and sensation.   No signs and symptoms of bleeding/hematoma noted.   IV access removed  Instruction provided, patient/family verbalizes understanding.   Dr. Macdonald spoke with patient/family post procedure.     Pt discharge via wheelchair to [facility]     Follow up Appointment: 01/25 with Dr. Borrero     New Prescription: Atorvastatin 40mg at bedtime and plavix 75mg daily - plavix covered at no charge and ready to be picked up. Atorvastatin they are still working on. Resume eliquis antony am.

## 2024-01-19 NOTE — TELEPHONE ENCOUNTER
Referral placed     Faxed referral, SILVERIO OV from 1/3 and ultrasound results from 1/16 ordered by SILVERIO to  office.    Awaiting fax confirmation, once received will send info to pt to call his office.

## 2024-01-19 NOTE — PROCEDURES
[facility] Cardiac Cath Procedure Note  Kirstin Sanders Patient Status:  Outpatient    3/6/1960 MRN [id number]   Location [facility] INTERVENTIONAL SUITES Attending No att. providers found   Hosp Day # 0 PCP Eleazar Morales MD       Cardiologist: Julian Macdonald MD  Primary Proceduralist: Julian Macdonald MD  Procedure Performed: COR and iFR of proximal LAD/PCI of proximal LAD with AMEENA x 1  Date of Procedure: 2024     Pre procedure diagnosis: Abnormal stress test, dyspnea on exertion  Post procedure diagnosis: As above    Summary of Case: After written informed consent was obtained from the patient, patient was brought to the cardiac catheterization laboratory.  Patient was prepped and draped in the usual sterile fashion. Lidocaine 1% was used to infiltrate the right wrist for local anesthesia and a 5-6 Urdu Slender introducer sheath was inserted into the right radial artery.      Selective coronary angiography performed with TIG catheter for RCA and for LCA.  Angiography performed in standard projections.        Post procedure findings:  1) Left Ventriculography at 30 degrees ANTONIO: Not performed, echo available  2) Hemodynamics: Not obtained  3) Coronaries:  Left main: No significant disease  LAD: Proximal LAD has long tapered 70% stenosis, mid and the distal LAD is free of any significant disease  Circumflex: No significant disease  RCA: No significant disease    Physiologic evaluation of proximal LAD    Guide Catheter: EBU 3  Wire: Volcano iFR wire   Wire advanced outside the guide catheter, wire zeroed and advanced through lesion.  iFR measurement taken, 0.84 (significant)     Specimen sent to: No specimen collected  Estimated blood loss: 10 cc  Closure: TR band      Lesion #1 proximal LAD  Lesion Characteristics-minimal torturous, minimal calcified.  Type non-C lesion.  Pre-intervention stenosis 70%, Post intervention stenosis 0%.  Pre JW 3, Post JW 3.     Guide  Catheter: EBU 3  Wire: iFR wire  Stent: 3 mm x 16 mm Synergy XD drug-eluting stent at 18 OWEN      IV was maintained by RN and moderate conscious sedation of versed and fentanyl was given.  Patient was assessed and monitoring of oxygen, heart rate and blood pressure by nurse and myself during the exam from 10:54 AM to 11:12 AM.      Julian Macdonald MD  01/18/24

## 2024-01-19 NOTE — TELEPHONE ENCOUNTER
Please notify pt that the fibroid has grown in comparison to her 2015 US with is consistent with her MRI findings.  Given growth in post menpausal female, she needs to see Dr Mcmahon to discuss potential need for hysterectomy as her fibroid should not be growing in menopause.      RNs, please notify of findings and then let Monse know you spoke to pt so she can contact with info/referral.    Monse, please see above and coordinate referral for growing fibroid in post menopausal female.  I'd like her to see Dr Mcmahon at [facility]

## 2024-02-07 PROCEDURE — 87086 URINE CULTURE/COLONY COUNT: CPT | Performed by: CLINICAL MEDICAL LABORATORY

## 2024-02-08 ENCOUNTER — LAB REQUISITION (OUTPATIENT)
Dept: LAB | Age: 64
End: 2024-02-08

## 2024-02-08 DIAGNOSIS — R35.0 FREQUENCY OF MICTURITION: ICD-10-CM

## 2024-02-08 LAB — BACTERIA UR CULT: NO GROWTH

## 2024-02-09 ENCOUNTER — TELEPHONE (OUTPATIENT)
Dept: INTERNAL MEDICINE CLINIC | Facility: CLINIC | Age: 64
End: 2024-02-09

## 2024-02-09 ENCOUNTER — TELEPHONE (OUTPATIENT)
Dept: GASTROENTEROLOGY | Facility: CLINIC | Age: 64
End: 2024-02-09

## 2024-02-09 ENCOUNTER — PATIENT MESSAGE (OUTPATIENT)
Dept: ADMINISTRATIVE | Age: 64
End: 2024-02-09

## 2024-02-09 DIAGNOSIS — R49.9 UNSPECIFIED VOICE AND RESONANCE DISORDER: Primary | ICD-10-CM

## 2024-02-09 DIAGNOSIS — Z12.11 COLON CANCER SCREENING: Primary | ICD-10-CM

## 2024-02-09 NOTE — TELEPHONE ENCOUNTER
Spoke with patient,  verified.   Gave her Dr Morales's instructions and referral information.  Sent copy via [place] per her request.  Transferred to hospitals to schedule pre op physical.  ;

## 2024-02-09 NOTE — TELEPHONE ENCOUNTER
Estella requesting patient to be seen sooner than next avail for Colon prior to hysterectomy.  Please call patient [phone].  For additional questions please call.  Thank you.

## 2024-02-09 NOTE — TELEPHONE ENCOUNTER
Per patient she is going to have a Hysterectomy surgery on 03/14/2024 and would like to come and see Dr Morales for Pre-op and no available appointment except \"res24\", can we offer those appointment, Please advise, Thank you.    Please reply to pool: [facility]

## 2024-02-09 NOTE — TELEPHONE ENCOUNTER
Future Appointments   Date Time Provider Department Center   2/13/2024 11:30 AM Bette Thorpe APRN [id number]   2/16/2024 11:40 AM Eleazar Morales MD [facility] [place]

## 2024-02-09 NOTE — TELEPHONE ENCOUNTER
This RN called GI office to request GI triage nurse to assist patient in scheduling appointment to evaluate for pre operative colonoscopy.   said she would send message to GI nurse, and request them to call patient.  This RN informed patient that I called GI on her behalf.  She was very appreciative.

## 2024-02-09 NOTE — TELEPHONE ENCOUNTER
Ranulfo Amos ([facility]),      Spoke with patient, name/ verified.    Stated she needs preop colonoscopy (has large uterine mass) prior to her scheduled hysterectomy on 3/14/24 w/ Dr. Mcmahon.    Dr. Morales's office reached out for a sooner appt and I offered appt below which pt accepted, date, time, location verified.     Thank you.     Your appointments       Date & Time Appointment Department (Center)    2024 11:30 AM CST Consult with Bette Thorpe APRN [facility] ([facility])

## 2024-02-09 NOTE — TELEPHONE ENCOUNTER
Call patient.  Referral placed.  She can make an appointment to see any of the members of the GI department for initial evaluation.

## 2024-02-09 NOTE — TELEPHONE ENCOUNTER
Per patient she would like a recommendation where she can go for her colonoscopy.  Per patient she know that she has a PPO.  Please advise.

## 2024-02-13 ENCOUNTER — OFFICE VISIT (OUTPATIENT)
Facility: CLINIC | Age: 64
End: 2024-02-13
Payer: COMMERCIAL

## 2024-02-13 ENCOUNTER — TELEPHONE (OUTPATIENT)
Facility: CLINIC | Age: 64
End: 2024-02-13

## 2024-02-13 VITALS
SYSTOLIC BLOOD PRESSURE: 119 MMHG | BODY MASS INDEX: 23 KG/M2 | WEIGHT: 131 LBS | HEART RATE: 108 BPM | DIASTOLIC BLOOD PRESSURE: 66 MMHG

## 2024-02-13 DIAGNOSIS — Z12.11 SCREENING FOR COLON CANCER: Primary | ICD-10-CM

## 2024-02-13 DIAGNOSIS — Z12.11 COLON CANCER SCREENING: Primary | ICD-10-CM

## 2024-02-13 PROCEDURE — 3074F SYST BP LT 130 MM HG: CPT

## 2024-02-13 PROCEDURE — 3078F DIAST BP <80 MM HG: CPT

## 2024-02-13 PROCEDURE — S0285 CNSLT BEFORE SCREEN COLONOSC: HCPCS

## 2024-02-13 RX ORDER — SODIUM, POTASSIUM,MAG SULFATES 17.5-3.13G
SOLUTION, RECONSTITUTED, ORAL ORAL
Qty: 1 EACH | Refills: 0 | Status: SHIPPED | OUTPATIENT
Start: 2024-02-13

## 2024-02-13 NOTE — PATIENT INSTRUCTIONS
1. Schedule colonoscopy with Dr. Irving, Dr. Jeff or Dr. Trimble or first available  Diagnosis: CRC screen   Sedation: MAC or IV  Prep: split dose suprep    *HOLD apixaban (Eliquis) for 28 hours prior to procedure and clopidogrel (Plavix) for 5 days prior to procedure    *GI RNs please reach out to cardiology, Dr. Macdonald, for approval to hold above meds and cardiac clearance for procedure (just had cardiac stent January 18th)    2.  bowel prep from pharmacy   You can pick the bowel prep up now and store in a cool, dry place in your home until your scheduled bowel prep start date.    3. MEDICATION CHANGES PRIOR TO PROCEDURE       A. 7 days BEFORE colonoscopy: HOLD Iron (ferrous sulfate/ ferrous gluconate) pills, herbal supplements, multivitamins, or weight loss/diet medications (i.e.Phentermine/Vyvanse)     B. 5 days BEFORE colonoscopy HOLD coumadin or plavix     C. 48 hours BEFORE colonoscopy HOLD xarelto or eliquis (with PCP approval)     D. 1 day BEFORE and day OF colonoscopy: HOLD diabetic medications  (insulin management per endocrine or PCP)     E. Continue ALL other medications as usual      F. DO NOT TAKE: Any form of alcohol, recreational drugs and any forms of erectile dysfunction medications 24 hours prior to procedure.    4. Read all bowel prep instructions carefully. Bowel prep instructions can also be found online at:  www.[facility].org/giprep     5. AVOID seeds, nuts, popcorn, raw fruits and vegetables for 5 days before procedure    6. If you start any NEW medication after your visit today, please notify us. Certain medications (like iron or weight loss medications) will need to be held before the procedure, or the procedure cannot be performed safely.

## 2024-02-13 NOTE — TELEPHONE ENCOUNTER
PPD Prior Auth:    Patient is scheduled for Colonoscopy 69712 on 2/26/2024. Please obtain prior authorization if needed.    Thanks!   Tranexamic Acid Counseling:  Patient advised of the small risk of bleeding problems with tranexamic acid. They were also instructed to call if they developed any nausea, vomiting or diarrhea. All of the patient's questions and concerns were addressed.

## 2024-02-13 NOTE — TELEPHONE ENCOUNTER
Bette-    I can put her on a wait list, I checked Dr. Trimble, Dr. Jeff and Dr. Irving's schedules and there are no openings for those days.    I will also keep an eye out for cancellations.    Thanks

## 2024-02-13 NOTE — H&P
[facility] - Gastroenterology                                                                                                  Clinic History and Physical     Chief Complaint   Patient presents with    Colonoscopy Screening     No history.     Abdominal Pain              Requesting physician or provider: Eleazar Morales MD    HPI:   Kirstin Sanders is a 63 year old year-old female with medical history including a fib on apixaban (Eliquis), cardiomyopathy, heart failure. Surgical hx of cardiac stent January 18th on clopidogrel (Plavix). She presents for colon cancer screening evaluation, told to get this done before uterine mass removal and hysterectomy scheduled for 3/14/24.    Patient is here today as a referral from her PCP for evaluation prior to undergoing colonoscopy for CRC screening. Patient denies any GI symptoms of nausea, vomiting, heartburn, dyspepsia, dysphagia, hematemesis, abdominal pain, change in bowel habits, constipation, diarrhea, hematochezia, or melena.  Additionally there is no weight loss and no reported chest pain or shortness of breath.  -does have intermittent suprapubic pain and has urinary frequency     Pt is due for CRC screening. We discussed the available screening options for CRC such as FIT and cologuard. They are electing to pursue colonoscopy at this time.     Last colonoscopy: none   Last EGD: none     NSAIDS: none   Tobacco: none   Alcohol: 2 drinks weekly  Marijuana: none   Illicit drugs:none     FH GI malignancy: none     No history of adverse reaction to sedation  No PAOLA  YES anticoagulants - clopidogrel (Plavix) & apixaban (Eliquis)   No pacemaker/defibrillator  No pain medications and/or sleep aides    Wt Readings from Last 6 Encounters:   02/13/24 131 lb (59.4 kg)   01/12/24 129 lb (58.5 kg)   01/03/24 130 lb (59 kg)   12/22/23 135 lb (61.2 kg)   07/19/23 135 lb (61.2 kg)   12/27/22 136 lb (61.7 kg)          History, Medications, Allergies, ROS:      Past  Medical History:   Diagnosis Date    A-fib (HCC)     Cardiomyopathy (HCC)     EF 45%    Heart failure (HCC)     Shoulder fracture, left 2014    Surgical repair; ok since       Past Surgical History:   Procedure Laterality Date    BUNIONECTOMY Bilateral     OTHER SURGICAL HISTORY Right     shoulder and humerous fractures repaired    OTHER SURGICAL HISTORY Bilateral     ear pinning    OTHER SURGICAL HISTORY Left     Ear stapectomy,failed    PT W/ CORONARY ARTERY STENT  01/18/2024      Family Hx:   Family History   Problem Relation Age of Onset    Heart Disorder Father         Heart valve disease    Cancer Mother 61        ovarian    Ovarian Cancer Mother 61    Breast Cancer Paternal Aunt         50s      Social History:   Social History     Socioeconomic History    Marital status:    Tobacco Use    Smoking status: Never    Smokeless tobacco: Never   Vaping Use    Vaping Use: Never used   Substance and Sexual Activity    Alcohol use: Yes     Alcohol/week: 2.0 standard drinks of alcohol     Types: 1 Glasses of wine, 1 Cans of beer per week     Comment: Weekend max 2    Drug use: No    Sexual activity: Yes     Partners: Male   Other Topics Concern    Caffeine Concern No    Exercise No    Right Handed Yes   Social History Narrative    The patient does not use an assistive device..      The patient does live in a home with stairs.        Medications (Active prior to today's visit):  Current Outpatient Medications   Medication Sig Dispense Refill    Na Sulfate-K Sulfate-Mg Sulf (SUPREP BOWEL PREP KIT) 17.5-3.13-1.6 GM/177ML Oral Solution Take as directed by GI clinic. Okay to substitute for generic. 1 each 0    clopidogrel 75 MG Oral Tab Take 1 tablet (75 mg total) by mouth daily. 90 tablet 3    atorvastatin 40 MG Oral Tab Take 1 tablet (40 mg total) by mouth nightly. 90 tablet 3    losartan 50 MG Oral Tab Take 1 tablet (50 mg total) by mouth nightly.      ELIQUIS 5 MG Oral Tab TK 1 T PO  BID  5    Metoprolol  Succinate ER 50 MG Oral Tablet 24 Hr Take 1 tablet (50 mg total) by mouth nightly.      DULoxetine 30 MG Oral Cap DR Particles Take 1 capsule (30 mg total) by mouth daily for 60 days, THEN 2 capsules (60 mg total) daily. 120 capsule 0       Allergies:  Allergies   Allergen Reactions    Hctz [Hydrochlorothiazide] SHORTNESS OF BREATH    Pcn [Penicillins] UNKNOWN and SHORTNESS OF BREATH     DYSPNEA  Since infancy  Couldn't breathe    Lisinopril SHORTNESS OF BREATH       ROS:   CONSTITUTIONAL: negative for fevers, chills, sweats  EYES Negative for scleral icterus or redness, and diplopia  HEENT: Negative for hoarseness  RESPIRATORY: Negative for cough and severe shortness of breath  CARDIOVASCULAR: Negative for crushing sub-sternal chest pain  GASTROINTESTINAL: See HPI  GENITOURINARY: Negative for dysuria  MUSCULOSKELETAL: Negative for arthralgias and myalgias  SKIN: Negative for jaundice, rash or pruritus  NEUROLOGICAL: Negative for dizziness and headaches  BEHAVIOR/PSYCH: Negative for psychotic behavior      PHYSICAL EXAM:   Blood pressure 119/66, pulse 108, weight 131 lb (59.4 kg).    GEN: Alert, no acute distress, well-nourished   HEENT: anicteric sclera, neck supple, trachea midline, MMM, no palpable or tender neck or supraclavicular lymph nodes  CV: RRR, the extremities are warm and well perfused   LUNGS: No increased work of breathing, CTAB  ABDOMEN: Soft, symmetrical, non-tender without distention or guarding. No scars or lesions. Aorta is without bruit or visible pulsation. Umbilicus is midline without herniation. Normoactive bowel sounds are present, No masses, hepatomegaly or splenomegaly noted.  MSK: No erythema, no warmth, no swelling of joints  SKIN: No jaundice, no erythema, no rashes, no lesions  HEMATOLOGIC: No bleeding, no bruising  NEURO: Alert and interactive, OROZCO  PSYCH: appropriate mood & affect    Labs/Imaging:     Patient's labs and imaging were reviewed and discussed with patient today.     .  ASSESSMENT/PLAN:   Kirstin Sanders is a 63 year old year-old female with medical history including a fib on apixaban (Eliquis), cardiomyopathy, heart failure. Surgical hx of cardiac stent January 18th on clopidogrel (Plavix). She presents for colon cancer screening evaluation, told to get this done before uterine mass removal and hysterectomy scheduled for 3/14/24.    # Average Risk screening: patient is considered average risk for colon cancer (No family hx of colon cancer) and it is appropriate to proceed with screening colonoscopy. Patient is currently asymptomatic and denies diarrhea, hematochezia, thin-stools or weight loss. We discussed risks/benefits/alternatives to procedure, including CT colonography and stool testing, they want to proceed with colonoscopy.  -No  anemia noted on lab work Jan 2024.    Recommend:   Schedule colonoscopy with Dr. Irving, Dr. Jeff or Dr. Trimble   Diagnosis: CRC screen   Sedation: MAC or IV  Prep: split dose suprep    *HOLD apixaban (Eliquis) for 28 hours prior to procedure and clopidogrel (Plavix) for 5 days prior to procedure    *GI RNs please reach out to cardiology, Dr. Macdonald, for approval to hold above meds and cardiac clearance for procedure (just had cardiac stent January 18th)    -Diabetes meds: N/A     Colonoscopy consent: I have discussed the risks, benefits, and alternatives to colonoscopy with the patient [who demonstrated understanding], including but not limited to the risks of bleeding, infection, pain, as well as the risks of anesthesia and perforation all leading to prolonged hospitalization, surgical intervention. I also specifically mentioned the miss rate of colonoscopy of 5-10% in the best of all circumstances. All questions were answered to the patient’s satisfaction. The patient elected to proceed with colonoscopy with intervention [i.e. polypectomy, etc.] as indicated.    Orders This Visit:  No orders of the defined types were  placed in this encounter.      Meds This Visit:  Requested Prescriptions     Signed Prescriptions Disp Refills    Na Sulfate-K Sulfate-Mg Sulf (SUPREP BOWEL PREP KIT) 17.5-3.13-1.6 GM/177ML Oral Solution 1 each 0     Sig: Take as directed by GI clinic. Okay to substitute for generic.       Imaging & Referrals:  None       WALTER Pollard    [facility] Gastroenterology  2/13/2024      The dictation was partially prepared using Dragon Medical voice recognition software. As a result, errors may occur. When identified, these errors have been corrected. While every attempt is made to correct errors during dictation, discrepancies may still exist.

## 2024-02-13 NOTE — TELEPHONE ENCOUNTER
Scheduled for: Colonoscopy [address]   Provider Name:     Date:  2/26/2024  Location:  [facility] [place]  Sedation:  MAC  Time:  7:30 am, (pt is aware to arrive at 6:30 am)  Prep: Suprep   Meds/Allergies Reconciled?: Bette/WALTER reviewed.   Diagnosis with codes:  Screening for Colon Cancer Z12.11  Was patient informed to call insurance with codes (Y/N):  Yes  Referral sent?:  Referral was sent at the time of electronic surgical scheduling.  EM or [facility] notified?: I sent an electronic request to Endo Scheduling and received a confirmation today.  Medication Orders: HOLD apixaban (Eliquis) for 28 hours prior to procedure and clopidogrel (Plavix) for 5 days prior to procedure     *GI RNs please reach out to cardiology, Dr. Macdonald, for approval to hold above meds and cardiac clearance for procedure (just had cardiac stent January 18th)   Misc Orders: N/A      Further instructions given by staff: I discussed the prep instructions with the patient which she verbally understood. Provided patient with prep instructions and cancellation policy at the time of office visit.

## 2024-02-13 NOTE — TELEPHONE ENCOUNTER
GI Schedulers -   This patient is currently scheduled for colonoscopy with Dr. Jeff on 2/26/24.  Patient will need to hold anticoagulation prior to hysterectomy on 3/14/24. Dr Jeff advised moving her colonoscopy to 1-2 days before hysterectomy to reduce the time she will be off anticoagulation/antiplatelet therapy.   Are we able to place patient on wait list for ANY provider to complete CRC screen colonoscopy on 3/12 or 3/13?    Schedule colonoscopy with Dr. Irving, Dr. Jeff or Dr. Trimble   Diagnosis: CRC screen   Sedation: MAC or IV  Prep: split dose suprep     *HOLD apixaban (Eliquis) for 28 hours prior to procedure and clopidogrel (Plavix) for 5 days prior to procedure  *GI RNs please reach out to cardiology, Dr. Macdonald, for approval to hold above meds and cardiac clearance for procedure (just had cardiac stent January 18th)    Thank you,   WALTER Pollard

## 2024-02-14 NOTE — TELEPHONE ENCOUNTER
I received orders from [facility].    1)PCI w/ LDL stent 1/18/2024. May NOT hold plavix.  2)no hx of CVA, TIA, may hold Eliquis for 2 days w/ small risk.    Letter sent for scanning    Received a call back from Tala ([facility] RN).    She stated their office is trying to reach out to the patient, she needs to be seen by cardiologist for cardiac clearance.

## 2024-02-14 NOTE — TELEPHONE ENCOUNTER
I spoke to Sarah PIERRE from [facility] to follow up request for cardiac, plavix and eliquis clearance.    She is aware the patient will have hysterectomy in mid March 2024.    She stated she will route message to APN.

## 2024-02-14 NOTE — TELEPHONE ENCOUNTER
Request for cardiac clearance, hold plavix for 5 days and eliquis for 2 days faxed to [facility].    Successful fax transmission received 17:57

## 2024-02-15 NOTE — TELEPHONE ENCOUNTER
Thank you. Noted cardiac reccs below. Patient is currently on wait list to move colonoscopy date to same week of hysterectomy.     \"  I received orders from [facility].     1)PCI w/ LDL stent 1/18/2024. May NOT hold plavix.  2)no hx of CVA, TIA, may hold Eliquis for 2 days w/ small risk.     Letter sent for scanning     Received a call back from Tala ([facility] RN).     She stated their office is trying to reach out to the patient, she needs to be seen by cardiologist for cardiac clearance.   \"    WALTER Pollard

## 2024-02-15 NOTE — PAT NURSING NOTE
Pt on eliquis and plavix   Per TE below :    Telephone Encounter  Bette Thorpe APRN (APN)  Nurse Practitioner Family  Thank you. Noted cardiac reccs below. Patient is currently on wait list to move colonoscopy date to same week of hysterectomy.      \"  I received orders from [facility].     1)PCI w/ LDL stent 1/18/2024. May NOT hold plavix.  2)no hx of CVA, TIA, may hold Eliquis for 2 days w/ small risk.     Letter sent for scanning     Received a call back from Tala ([facility] RN).     She stated their office is trying to reach out to the patient, she needs to be seen by cardiologist for cardiac clearance.   \"     WALTER Pollard          Pt called and informed of above.

## 2024-02-16 ENCOUNTER — OFFICE VISIT (OUTPATIENT)
Dept: INTERNAL MEDICINE CLINIC | Facility: CLINIC | Age: 64
End: 2024-02-16

## 2024-02-16 VITALS
HEIGHT: 63 IN | BODY MASS INDEX: 23.21 KG/M2 | SYSTOLIC BLOOD PRESSURE: 129 MMHG | OXYGEN SATURATION: 97 % | WEIGHT: 131 LBS | DIASTOLIC BLOOD PRESSURE: 75 MMHG | HEART RATE: 85 BPM

## 2024-02-16 DIAGNOSIS — I42.0 DILATED CARDIOMYOPATHY (HCC): ICD-10-CM

## 2024-02-16 DIAGNOSIS — I25.10 CORONARY ARTERY DISEASE INVOLVING NATIVE CORONARY ARTERY OF NATIVE HEART WITHOUT ANGINA PECTORIS: ICD-10-CM

## 2024-02-16 DIAGNOSIS — I48.91 ATRIAL FIBRILLATION, UNSPECIFIED TYPE (HCC): ICD-10-CM

## 2024-02-16 DIAGNOSIS — N85.8 UTERINE MASS: ICD-10-CM

## 2024-02-16 DIAGNOSIS — Z01.818 PRE-OP EXAMINATION: Primary | ICD-10-CM

## 2024-02-16 PROCEDURE — 3078F DIAST BP <80 MM HG: CPT | Performed by: INTERNAL MEDICINE

## 2024-02-16 PROCEDURE — 3008F BODY MASS INDEX DOCD: CPT | Performed by: INTERNAL MEDICINE

## 2024-02-16 PROCEDURE — 3074F SYST BP LT 130 MM HG: CPT | Performed by: INTERNAL MEDICINE

## 2024-02-16 PROCEDURE — 99214 OFFICE O/P EST MOD 30 MIN: CPT | Performed by: INTERNAL MEDICINE

## 2024-02-16 NOTE — PROGRESS NOTES
HPI:    Patient ID: Kirstin Sanders is a 63 year old female.    HPI  Patient is here for preoperative evaluation.  She is scheduled to undergo surgery on March 14 with Dr Elizabeth for full hysterectomy. Imaging showed enlarging fibroid. Surgeon is concerned about possibility of cancer. .    I last saw her in the office in July of last year for general physical exam.  History of atrial fibrillation, dilated cardiomyopathy, hyperlipidemia, lumbar disc disease, migraines.  Current medications reviewed.    Pt had a stent placed in LAD for 70% blockage on Jan 18. SHe was feeling tired and short of breath. SHe feels fine now. She tries to keep a positive mental attitude. Some discomfort with urinating. She walks the dog for 8-10,000 steps.     Cardiology advised ok to stop the ELiquis for 2 days but not the Plavix. Blood work will be done next week. Colonoscopy is planned for Feb 22.      Patient Active Problem List   Diagnosis    Atrial fibrillation (HCC)    Dilated cardiomyopathy (HCC)    Hypercholesteremia    Otosclerosis of left ear    Loss of perception for temperature    Dysphagia    New onset of headaches after age 52    Neuropathic pain of right lower extremity          HISTORY:  Past Medical History:   Diagnosis Date    A-fib (HCC)     Cardiomyopathy (HCC)     EF 45%    Heart failure (HCC)     High blood pressure     High cholesterol     Shoulder fracture, left 2014    Surgical repair; ok since       Past Surgical History:   Procedure Laterality Date    BUNIONECTOMY Bilateral     OTHER SURGICAL HISTORY Right     shoulder and humerous fractures repaired    OTHER SURGICAL HISTORY Bilateral     ear pinning    OTHER SURGICAL HISTORY Left     Ear stapectomy,failed    PT W/ CORONARY ARTERY STENT  01/18/2024      Family History   Problem Relation Age of Onset    Heart Disorder Father         Heart valve disease    Cancer Mother 61        ovarian    Ovarian Cancer Mother 61    Breast Cancer Paternal Aunt          50s      Social History     Socioeconomic History    Marital status:    Tobacco Use    Smoking status: Never    Smokeless tobacco: Never   Vaping Use    Vaping Use: Never used   Substance and Sexual Activity    Alcohol use: Yes     Alcohol/week: 2.0 standard drinks of alcohol     Types: 1 Glasses of wine, 1 Cans of beer per week     Comment: Weekend max 2    Drug use: No    Sexual activity: Yes     Partners: Male   Other Topics Concern    Caffeine Concern No    Exercise No    Right Handed Yes   Social History Narrative    The patient does not use an assistive device..      The patient does live in a home with stairs.          Review of Systems   Constitutional:  Negative for chills and fever.   Gastrointestinal:  Negative for abdominal pain, diarrhea and nausea.   Genitourinary:  Negative for dysuria and hematuria.   Neurological:  Negative for weakness and numbness.             Current Outpatient Medications   Medication Sig Dispense Refill    Na Sulfate-K Sulfate-Mg Sulf (SUPREP BOWEL PREP KIT) 17.5-3.13-1.6 GM/177ML Oral Solution Take as directed by GI clinic. Okay to substitute for generic. 1 each 0    clopidogrel 75 MG Oral Tab Take 1 tablet (75 mg total) by mouth daily. 90 tablet 3    atorvastatin 40 MG Oral Tab Take 1 tablet (40 mg total) by mouth nightly. 90 tablet 3    losartan 50 MG Oral Tab Take 1 tablet (50 mg total) by mouth nightly.      ELIQUIS 5 MG Oral Tab TK 1 T PO  BID  5    Metoprolol Succinate ER 50 MG Oral Tablet 24 Hr Take 1 tablet (50 mg total) by mouth nightly.       Allergies:  Allergies   Allergen Reactions    Hctz [Hydrochlorothiazide] SHORTNESS OF BREATH    Pcn [Penicillins] UNKNOWN and SHORTNESS OF BREATH     DYSPNEA  Since infancy  Couldn't breathe    Lisinopril SHORTNESS OF BREATH        PHYSICAL EXAM:   /75   Pulse 85   Ht 5' 3\" (1.6 m)   Wt 131 lb (59.4 kg)   SpO2 97%   BMI 23.21 kg/m²      Physical Exam  Constitutional:       Appearance: Normal appearance. She is  well-developed.   HENT:      Nose: Nose normal.      Mouth/Throat:      Pharynx: No oropharyngeal exudate or posterior oropharyngeal erythema.   Eyes:      Conjunctiva/sclera: Conjunctivae normal.   Neck:      Vascular: No carotid bruit.   Cardiovascular:      Rate and Rhythm: Normal rate. Rhythm irregular.      Pulses: Normal pulses.      Heart sounds: Normal heart sounds. No murmur heard.     No friction rub. No gallop.   Pulmonary:      Effort: Pulmonary effort is normal.      Breath sounds: Normal breath sounds. No wheezing or rales.   Abdominal:      General: Bowel sounds are normal.      Palpations: Abdomen is soft. There is no mass.      Tenderness: There is no abdominal tenderness.   Musculoskeletal:         General: No tenderness.      Cervical back: Neck supple.      Right lower leg: No edema.      Left lower leg: No edema.   Lymphadenopathy:      Cervical: No cervical adenopathy.   Skin:     General: Skin is warm and dry.      Findings: No rash.   Neurological:      General: No focal deficit present.      Mental Status: She is alert.   Psychiatric:         Mood and Affect: Mood normal.         Behavior: Behavior normal.         Thought Content: Thought content normal.          Wt Readings from Last 6 Encounters:   02/16/24 131 lb (59.4 kg)   02/15/24 131 lb (59.4 kg)   02/13/24 131 lb (59.4 kg)   01/12/24 129 lb (58.5 kg)   01/03/24 130 lb (59 kg)   12/22/23 135 lb (61.2 kg)             ASSESSMENT/PLAN:   1. Pre-op examination  Patient is scheduled to undergo total hysterectomy for removal of suspicious uterine mass that is enlarging in a postmenopausal female.  History of cardiac very recent placement of issues with atrial fibrillation and cardiomyopathy.  Now with stent.  Patient will not be able to stop Plavix prior to the procedure.  She is been seen by cardiology.  Clearance and official recommendations on medications will come from cardiology.  Otherwise she appears medically fit.  We are awaiting  the preoperative blood test.  If those are normal, the patient is cleared to proceed with surgery.    2. Uterine mass  Scheduled surgery as above.    3. Coronary artery disease involving native coronary artery of native heart without angina pectoris  Patient with recent discovery of stenosis in LAD.  Status post stenting.  She is doing well.  Follow-up with cardiology.  Continue current treatment.    4. Atrial fibrillation, unspecified type (HCC)  Currently atrial fibrillation.  Ventricular rate controlled.  Continue current treatment including Plavix and Eliquis.    5. Dilated cardiomyopathy (HCC)  No evidence of volume overload at this time.  Continue current treatment.         Meds This Visit:  Requested Prescriptions      No prescriptions requested or ordered in this encounter       Imaging & Referrals:  None         Eleazar Morales MD

## 2024-02-20 ENCOUNTER — TELEPHONE (OUTPATIENT)
Dept: OBGYN CLINIC | Facility: CLINIC | Age: 64
End: 2024-02-20

## 2024-02-20 ENCOUNTER — TELEPHONE (OUTPATIENT)
Facility: CLINIC | Age: 64
End: 2024-02-20

## 2024-02-20 NOTE — TELEPHONE ENCOUNTER
Is cardiology recommending Lovenox or is holding the apixaban the day before and the morning of the procedure low risk and what is being recommended.

## 2024-02-20 NOTE — TELEPHONE ENCOUNTER
Ranulfo Amos     Should we cancel colonoscopy on 2/26/2024?    The patient just called. See TE from 2/20/2024.    As per GI , no openings in March prior to her surgery on 3/14/2024.    Thank you

## 2024-02-20 NOTE — TELEPHONE ENCOUNTER
Ranulfo ABAD    I called and spoke to the patient regarding getting lovenox injections which would be given only in the abdomen.    She stated she has no other options.    I spoke to Sarah PIERRE from [facility].    I informed her that the patient would like to pursue the bridging w/ lovenox.    As per Sarah, Dr Borrero only addressed to hold eliquis for 2 days, not plavix.    She will consult to Dr Borrero regarding Plavix.    She knows we only have until tomorrow to instruct the patient to hold plavix if ok w/ cardiologist.    Thank you.

## 2024-02-20 NOTE — TELEPHONE ENCOUNTER
Pt is scheduled 2/26/24 for CLN.  Pt states that Dr Borrero will not authorize stopping Eloquis and Plavix.  He informed her to request a bridge.  Please call

## 2024-02-20 NOTE — TELEPHONE ENCOUNTER
POLLO Jeff. Update: Patient told lovenox bridging may be an option. Livia is reaching out to [facility] to see if this can be initiated since colonoscopy is scheduled on 2/26. Patient is wait listed for 3/11-3/13 but no openings yet.     If unable to coordinate lovenox bridge with [facility] by Thursday may need to cancel 2/26 CLN and waitlist for colonoscopy with yourself or other provider any date before March 14th.     Bette Thorpe, APRN

## 2024-02-21 NOTE — TELEPHONE ENCOUNTER
I spoke to Sarah from [facility] to let her know that the patient can continue taking plavix.  She was appreciative of the call.      From Dr Borrero's notes on 2/14/2024  ASSESSMENT    Preoperative cardiac risk assessment  -Cardiac tests reviewed, as detailed below  -Pt is asymptomatic, without symptoms/signs of active ischemia or heart failure  -The pt is at low CV risk for the procedure. All cardiac medications should be continued perioperatively, except as follows: hold Eliquis x 2 days. If surgeon requires a longer hold, she will need to be bridged with Lovenox.    Coronary artery disease  -S/p cardiac cath and LAD PCI, 1/18/24  -Symptoms of fatigue and dyspnea have resolved  -Continue Plavix. No aspirin since on Eliquis.  -Continue Toprol XL  -Phase 2 Cardiac RehabChronic systolic CHF  -Echo 11/29/23: EF 40-45%  -Likely ischemic CM  -Now NYHA Class 1 status after LAD PCI  -Continue current meds  -Echo scheduled in April, and see me after thatAtrial fibrillation, permanent -Rate-controlled -Continue Toprol-XL Hypertension -BP at goal  -Continue current medsHyperlipidemia  -Newly on Lipitor 40 mg nightly  -Recheck fasting lipids prior to April visit  Venous insufficiency  -She sees [facility] for her venous insufficiency, had treatments 2/2022  Long-term use of anticoagulant  -No bleeding problems  -Continue Eliquis  -Semiannual blood work to monitor

## 2024-02-21 NOTE — TELEPHONE ENCOUNTER
Called and spoke to the patient, /name verified.    I instructed the patient to continue taking plavix, stop eliquis for 2 days starting 2024 () and 2024.    The patient verbalized understanding by stating the dates to hold eliquis.

## 2024-02-21 NOTE — TELEPHONE ENCOUNTER
I only need the Eliquis held the day before and the morning of the procedure.  I would therefore not use bridging for this short period of time.  Plavix will likely need to be continued from a cardiac standpoint.  The colonoscopy can be performed on the Plavix.

## 2024-02-21 NOTE — TELEPHONE ENCOUNTER
Dr Zendejas    As per [facility] RN, Dr Borrero only approved of holding Eliquis for 2 days and if longer need to bridge w/ lovenox but he did not indicate in his notes that we can hold plavix for 5 days.    The [facility] RN sent a message today verifying if we can hold plavix for 5 days.    She knows I will call back tomorrow to follow up.    Thank you

## 2024-02-26 ENCOUNTER — ANESTHESIA (OUTPATIENT)
Dept: ENDOSCOPY | Facility: HOSPITAL | Age: 64
End: 2024-02-26
Payer: COMMERCIAL

## 2024-02-26 ENCOUNTER — HOSPITAL ENCOUNTER (OUTPATIENT)
Facility: HOSPITAL | Age: 64
Setting detail: HOSPITAL OUTPATIENT SURGERY
Discharge: HOME | End: 2024-02-26
Attending: INTERNAL MEDICINE | Admitting: INTERNAL MEDICINE
Payer: COMMERCIAL

## 2024-02-26 ENCOUNTER — ANESTHESIA EVENT (OUTPATIENT)
Dept: ENDOSCOPY | Facility: HOSPITAL | Age: 64
End: 2024-02-26
Payer: COMMERCIAL

## 2024-02-26 VITALS
OXYGEN SATURATION: 100 % | DIASTOLIC BLOOD PRESSURE: 82 MMHG | WEIGHT: 131 LBS | SYSTOLIC BLOOD PRESSURE: 108 MMHG | BODY MASS INDEX: 23.21 KG/M2 | HEIGHT: 63 IN | HEART RATE: 85 BPM | RESPIRATION RATE: 15 BRPM

## 2024-02-26 DIAGNOSIS — Z12.11 SCREENING FOR COLON CANCER: ICD-10-CM

## 2024-02-26 PROCEDURE — 0DBL8ZZ EXCISION OF TRANSVERSE COLON, VIA NATURAL OR ARTIFICIAL OPENING ENDOSCOPIC: ICD-10-PCS | Performed by: INTERNAL MEDICINE

## 2024-02-26 PROCEDURE — 45385 COLONOSCOPY W/LESION REMOVAL: CPT | Performed by: INTERNAL MEDICINE

## 2024-02-26 DEVICE — CLIP HEMSTAS W11XL230CM 2.8MM WRK CHN W/ SMRT: Type: IMPLANTABLE DEVICE | Status: FUNCTIONAL

## 2024-02-26 RX ORDER — SODIUM CHLORIDE, SODIUM LACTATE, POTASSIUM CHLORIDE, CALCIUM CHLORIDE 600; 310; 30; 20 MG/100ML; MG/100ML; MG/100ML; MG/100ML
INJECTION, SOLUTION INTRAVENOUS CONTINUOUS
Status: DISCONTINUED | OUTPATIENT
Start: 2024-02-26 | End: 2024-02-26

## 2024-02-26 RX ORDER — LIDOCAINE HYDROCHLORIDE 10 MG/ML
INJECTION, SOLUTION EPIDURAL; INFILTRATION; INTRACAUDAL; PERINEURAL AS NEEDED
Status: DISCONTINUED | OUTPATIENT
Start: 2024-02-26 | End: 2024-02-26 | Stop reason: SURG

## 2024-02-26 RX ADMIN — SODIUM CHLORIDE, SODIUM LACTATE, POTASSIUM CHLORIDE, CALCIUM CHLORIDE: 600; 310; 30; 20 INJECTION, SOLUTION INTRAVENOUS at 07:34:00

## 2024-02-26 RX ADMIN — LIDOCAINE HYDROCHLORIDE 50 MG: 10 INJECTION, SOLUTION EPIDURAL; INFILTRATION; INTRACAUDAL; PERINEURAL at 07:34:00

## 2024-02-26 NOTE — ANESTHESIA POSTPROCEDURE EVALUATION
Patient: Kirstin Sanders    Procedure Summary       Date: 02/26/24 Room / Location: [facility] ENDOSCOPY 01 / [facility] ENDOSCOPY    Anesthesia Start: 0731 Anesthesia Stop:     Procedure: COLONOSCOPY with polypectomy, clip x 3 Diagnosis:       Screening for colon cancer      (colon polyp, diverticulosis)    Surgeons: Aashish Jeff MD Anesthesiologist: Natty Moreno CRNA    Anesthesia Type: general, MAC ASA Status: 2            Anesthesia Type: general, MAC    Vitals Value Taken Time   /87 02/26/24 0811   Temp 98.6 02/26/24 0811   Pulse 8813 02/26/24 0811   Resp 14 02/26/24 0811   SpO2 97 02/26/24 0811       [facility] AN Post Evaluation:   Patient Evaluated in PACU  Patient Participation: complete - patient participated  Level of Consciousness: awake and alert  Pain Score: 0  Pain Management: adequate  Airway Patency:patent  Dental exam unchanged from preop  Yes    Cardiovascular Status: acceptable  Respiratory Status: acceptable  Postoperative Hydration acceptable      NATTY MOREON CRNA  2/26/2024 8:11 AM

## 2024-02-26 NOTE — H&P
History & Physical Examination    Patient Name: Kirstin Sanders  MRN: [id number]  CSN: [id number]  YOB: 1960    Diagnosis: Colorectal cancer screening      Medications Prior to Admission   Medication Sig Dispense Refill Last Dose    clopidogrel 75 MG Oral Tab Take 1 tablet (75 mg total) by mouth daily. 90 tablet 3 2/25/2024    atorvastatin 40 MG Oral Tab Take 1 tablet (40 mg total) by mouth nightly. 90 tablet 3 2/25/2024    losartan 50 MG Oral Tab Take 1 tablet (50 mg total) by mouth nightly.   2/25/2024    ELIQUIS 5 MG Oral Tab TK 1 T PO  BID  5 2/24/2024    Metoprolol Succinate ER 50 MG Oral Tablet 24 Hr Take 0.5 tablets (25 mg total) by mouth nightly.   2/25/2024    Na Sulfate-K Sulfate-Mg Sulf (SUPREP BOWEL PREP KIT) 17.5-3.13-1.6 GM/177ML Oral Solution Take as directed by GI clinic. Okay to substitute for generic. 1 each 0      Current Facility-Administered Medications   Medication Dose Route Frequency    lactated ringers infusion   Intravenous Continuous       Allergies:   Allergies   Allergen Reactions    Hctz [Hydrochlorothiazide] SHORTNESS OF BREATH    Pcn [Penicillins] UNKNOWN and SHORTNESS OF BREATH     DYSPNEA  Since infancy  Couldn't breathe    Lisinopril SHORTNESS OF BREATH       Past Medical History:   Diagnosis Date    A-fib (HCC)     Cardiomyopathy (HCC)     EF 45%    Heart failure (HCC)     High blood pressure     High cholesterol     Shoulder fracture, left 2014    Surgical repair; ok since      Past Surgical History:   Procedure Laterality Date    BUNIONECTOMY Bilateral     OTHER SURGICAL HISTORY Right     shoulder and humerous fractures repaired    OTHER SURGICAL HISTORY Bilateral     ear pinning    OTHER SURGICAL HISTORY Left     Ear stapectomy,failed    PT W/ CORONARY ARTERY STENT  01/18/2024     Family History   Problem Relation Age of Onset    Heart Disorder Father         Heart valve disease    Cancer Mother 61        ovarian    Ovarian Cancer Mother 61    Breast Cancer  Paternal Aunt         50s     Social History     Tobacco Use    Smoking status: Never    Smokeless tobacco: Never   Substance Use Topics    Alcohol use: Yes     Alcohol/week: 2.0 standard drinks of alcohol     Types: 1 Glasses of wine, 1 Cans of beer per week     Comment: Weekend max 2       SYSTEM Check if Review is Normal Check if Physical Exam is Normal If not normal, please explain:   HEENT [X ] [ X]    NECK  [X ] [ X]    HEART [X ] [ X]    LUNGS [X ] [ X]    ABDOMEN [X ] [ X]    EXTREMITIES [X ] [ X]    OTHER        [ x ] I have discussed the risks and benefits and alternatives with the patient/family.  They understand and agree to proceed with plan of care.  [ x ] I have reviewed the History and Physical done within the last 30 days.  Any changes noted above.    Aashish Jeff MD  2/26/2024  7:28 AM

## 2024-02-26 NOTE — DISCHARGE INSTRUCTIONS
Home Care Instructions for Colonoscopy  with Sedation    Diet:  - Resume your regular diet as tolerated unless otherwise instructed.  - Start with light meals to minimize bloating.  - Do not drink alcohol today.    Medication:  - If you have questions about resuming your normal medications, please contact your Primary Care Physician.    Activities:  - Take it easy today. Do not return to work today.  - Do not drive today.  - Do not operate any machinery today (including kitchen equipment).    Colonoscopy:  - You may notice some rectal \"spotting\" (a little blood on the toilet tissue) for a day or two after the exam. This is normal.  - If you experience any rectal bleeding (not spotting), persistent tenderness or sharp severe abdominal pains, oral temperature over 100 degrees Fahrenheit, light-headedness or dizziness, or any other problems, contact your doctor.    **If unable to reach your doctor, please go to the [facility] Emergency Room**    - Your referring physician will receive a full report of your examination.  - If you do not hear from your doctor's office within two weeks of your biopsy, please call them for your results.    You may be able to see your laboratory results in [email or web address] between 4 and 7 business days.  In some cases, your physician may not have viewed the results before they are released to [email or web address].  If you have questions regarding your results contact the physician who ordered the test/exam by phone or via [email or web address] by choosing \"Ask a Medical Question.\"

## 2024-02-26 NOTE — OPERATIVE REPORT
[facility] Endoscopy Report      Date of Procedure:  02/26/24      Preoperative Diagnosis:  Colorectal cancer screening      Postoperative Diagnosis:  1.  Transverse colon polyp  2.  Sigmoid colon diverticulosis      Procedure:    Colonoscopy with polypectomy      Surgeon:  Aashish Jeff M.D.      Anesthesia:  Monitored anesthesia care  Cecal withdrawal time: 22 minutes  EBL:  Insignificant      Brief History:  This is a 63 year old female who presents for a screening colonoscopy.  She has had no lower gastrointestinal tract symptoms or signs.  She has an enlarged uterus and is scheduled for a hysterectomy.  Colonoscopy is being performed to exclude colonic pathology that would alter the surgical approach.  Of note the patient is on systemic anticoagulation with apixaban which was held yesterday and this morning and clopidogrel for recent stent placement.  The latter cannot be held in light of stent placement in January.        Technique:  After informed consent, the patient was placed in the left lateral recumbent position.  Digital rectal examination revealed no palpable intraluminal abnormalities.  An Olympus variable stiffness 190 series HD pediatric colonoscope was inserted into the rectum and advanced under direct vision by following the lumen to the terminal ileum.  The colon was examined upon withdrawal in the left lateral recumbent position.      Findings:  The preparation of the colon was good.  The terminal ileum was examined for 5 cm and visually normal.  The ileocecal valve was well preserved. The visualized colonic mucosa from the cecum to the anal verge was normal with an intact vascular pattern.  In the proximal to mid transverse colon there was a 6-7 mm sessile polyp which was cold snare excised and retrieved.  The site was secured with #3 hemostatic clips.  No ongoing bleeding.  There was at least #1 early diverticulum seen in the sigmoid colon without current signs of  complication.  There were no other colonic polyps, extrinsic compression, mass lesions, vascular anomalies or signs of inflammation seen.  Retroflexion in the rectum revealed no abnormalities.  The procedure was well tolerated without immediate complication.      Impression:  1.  Small transverse colon polyp  2.  Uncomplicated sigmoid colon diverticulosis    Recommendations:  1.  Standard postprocedural instructions given.  2.  Resume apixaban tonight/tomorrow morning.  Cautiously continue clopidogrel.  3.  Follow-up biopsy results.  Polyp histology to determine recommendations regarding follow-up.          Aashish Jeff MD  2/26/2024

## 2024-02-26 NOTE — ANESTHESIA PREPROCEDURE EVALUATION
Anesthesia PreOp Note    HPI:     Kirstin Sanders is a 63 year old female who presents for preoperative consultation requested by: Aashish Jeff MD    Date of Surgery: 2/26/2024    Procedure(s):  COLONOSCOPY  Indication: Screening for colon cancer    Relevant Problems   No relevant active problems       NPO:  Last Liquid Consumption Date: 02/26/24  Last Liquid Consumption Time: 0200  Last Solid Consumption Date: 02/25/24  Last Solid Consumption Time: 1000  Last Liquid Consumption Date: 02/26/24          History Review:  Patient Active Problem List    Diagnosis Date Noted    New onset of headaches after age 52 12/22/2023    Neuropathic pain of right lower extremity 12/22/2023    Loss of perception for temperature 11/04/2021    Dysphagia 11/04/2021    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    Dilated cardiomyopathy (HCC) 07/25/2017    Hypercholesteremia 07/25/2017    Atrial fibrillation (HCC) 06/18/2017       Past Medical History:   Diagnosis Date    A-fib (HCC)     Cardiomyopathy (HCC)     EF 45%    Heart failure (HCC)     High blood pressure     High cholesterol     Shoulder fracture, left 2014    Surgical repair; ok since        Past Surgical History:   Procedure Laterality Date    BUNIONECTOMY Bilateral     OTHER SURGICAL HISTORY Right     shoulder and humerous fractures repaired    OTHER SURGICAL HISTORY Bilateral     ear pinning    OTHER SURGICAL HISTORY Left     Ear stapectomy,failed    PT W/ CORONARY ARTERY STENT  01/18/2024       Medications Prior to Admission   Medication Sig Dispense Refill Last Dose    clopidogrel 75 MG Oral Tab Take 1 tablet (75 mg total) by mouth daily. 90 tablet 3 2/25/2024    atorvastatin 40 MG Oral Tab Take 1 tablet (40 mg total) by mouth nightly. 90 tablet 3 2/25/2024    losartan 50 MG Oral Tab Take 1 tablet (50 mg total) by mouth nightly.   2/25/2024    ELIQUIS 5 MG Oral Tab TK 1 T PO  BID  5 2/24/2024    Metoprolol Succinate ER 50 MG Oral Tablet 24 Hr Take 0.5 tablets  (25 mg total) by mouth nightly.   2/25/2024    Na Sulfate-K Sulfate-Mg Sulf (SUPREP BOWEL PREP KIT) 17.5-3.13-1.6 GM/177ML Oral Solution Take as directed by GI clinic. Okay to substitute for generic. 1 each 0      Current Facility-Administered Medications Ordered in Epic   Medication Dose Route Frequency Provider Last Rate Last Admin    lactated ringers infusion   Intravenous Continuous Aashish Jeff MD         No current [facility]-ordered outpatient medications on file.       Allergies   Allergen Reactions    Hctz [Hydrochlorothiazide] SHORTNESS OF BREATH    Pcn [Penicillins] UNKNOWN and SHORTNESS OF BREATH     DYSPNEA  Since infancy  Couldn't breathe    Lisinopril SHORTNESS OF BREATH       Family History   Problem Relation Age of Onset    Heart Disorder Father         Heart valve disease    Cancer Mother 61        ovarian    Ovarian Cancer Mother 61    Breast Cancer Paternal Aunt         50s     Social History     Socioeconomic History    Marital status:    Tobacco Use    Smoking status: Never    Smokeless tobacco: Never   Vaping Use    Vaping Use: Never used   Substance and Sexual Activity    Alcohol use: Yes     Alcohol/week: 2.0 standard drinks of alcohol     Types: 1 Glasses of wine, 1 Cans of beer per week     Comment: Weekend max 2    Drug use: No    Sexual activity: Yes     Partners: Male   Other Topics Concern    Caffeine Concern No    Exercise No    Right Handed Yes       Available pre-op labs reviewed.  Lab Results   Component Value Date    WBC 7.4 01/16/2024    RBC 5.08 01/16/2024    HGB 15.1 01/16/2024    HCT 47.9 01/16/2024    MCV 94.3 01/16/2024    MCH 29.7 01/16/2024    MCHC 31.5 01/16/2024    RDW 12.8 01/16/2024    .0 01/16/2024     Lab Results   Component Value Date     01/16/2024    K 4.0 01/16/2024     01/16/2024    CO2 28.0 01/16/2024    BUN 15 01/16/2024    CREATSERUM 0.80 01/16/2024    GLU 99 01/16/2024    CA 9.5 01/16/2024          Vital Signs:  Body mass index  is 23.21 kg/m².   height is 1.6 m (5' 3\") and weight is 59.4 kg (131 lb). Her blood pressure is 124/75 and her pulse is 91. Her respiration is 18 and oxygen saturation is 96%.   Vitals:    02/15/24 1346 02/26/24 0656   BP:  124/75   Pulse:  91   Resp:  18   SpO2:  96%   Weight: 59.4 kg (131 lb)    Height: 1.6 m (5' 3\")         Anesthesia Evaluation     Patient summary reviewed and Nursing notes reviewed    Airway   Mallampati: II  Dental - Dentition appears grossly intact     Pulmonary - negative ROS and normal exam   Cardiovascular - normal exam  (+) hypertension, valvular problems/murmurs, CAD, CABG/stent, dysrhythmias    Neuro/Psych - negative ROS     GI/Hepatic/Renal    (+) bowel prep    Endo/Other    (+) arthritis  Abdominal  - normal exam                 Anesthesia Plan:   ASA:  4  Plan:   General and MAC  Informed Consent Plan and Risks Discussed With:  Patient  Discussed plan with:  CRNA and surgeon      I have informed Kirstin Sanders and/or legal guardian or family member of the nature of the anesthetic plan, benefits, risks including possible dental damage if relevant, major complications, and any alternative forms of anesthetic management.   All of the patient's questions were answered to the best of my ability. The patient desires the anesthetic management as planned.  NATTY MORENO CRNA  2/26/2024 7:20 AM  Present on Admission:  **None**

## 2024-02-28 ENCOUNTER — TELEPHONE (OUTPATIENT)
Facility: CLINIC | Age: 64
End: 2024-02-28

## 2024-02-28 NOTE — TELEPHONE ENCOUNTER
Result Notes     Aashish Jeff MD  2/26/2024  7:28 PM CST Back to Top      I spoke to Kirstin.  She is feeling well.  She had a solitary tubular adenoma removed.  8 mm of tissue was removed.  I discussed the significance.  Uncomplicated diverticulosis was present.  I have recommended a high-fiber diet for diverticulosis and a surveillance colonoscopy in 5 years.  She notes episodic dysphagia and near syncope when swallowing certain items such as hot coffee.  I have asked her to follow-up with us if this continues and once she recovers from her upcoming gynecologic surgery.     GI RNs: Please enter colonoscopy recall for 5 years.

## 2024-02-28 NOTE — TELEPHONE ENCOUNTER
Health maintenance updated.     Last colonoscopy done 2/26/2024 by Dr Jeff    Recall placed into Pt Outreach, next due on 2/2029 per Dr Jeff.

## 2024-03-01 ENCOUNTER — LAB ENCOUNTER (OUTPATIENT)
Dept: LAB | Age: 64
End: 2024-03-01
Attending: OBSTETRICS & GYNECOLOGY
Payer: COMMERCIAL

## 2024-03-01 ENCOUNTER — HOSPITAL ENCOUNTER (OUTPATIENT)
Dept: GENERAL RADIOLOGY | Age: 64
Discharge: HOME | End: 2024-03-01
Attending: OBSTETRICS & GYNECOLOGY
Payer: COMMERCIAL

## 2024-03-01 DIAGNOSIS — Z01.818 PRE-OP TESTING: Primary | ICD-10-CM

## 2024-03-01 DIAGNOSIS — Z01.818 PREOP TESTING: ICD-10-CM

## 2024-03-01 LAB
ALBUMIN SERPL-MCNC: 4.5 G/DL (ref 3.2–4.8)
ALBUMIN/GLOB SERPL: 1.4 {RATIO} (ref 1–2)
ALP LIVER SERPL-CCNC: 108 U/L
ALT SERPL-CCNC: 17 U/L
ANION GAP SERPL CALC-SCNC: 5 MMOL/L (ref 0–18)
AST SERPL-CCNC: 25 U/L (ref ?–34)
BASOPHILS # BLD AUTO: 0.02 X10(3) UL (ref 0–0.2)
BASOPHILS NFR BLD AUTO: 0.3 %
BILIRUB SERPL-MCNC: 1.5 MG/DL (ref 0.2–1.1)
BUN BLD-MCNC: 14 MG/DL (ref 9–23)
BUN/CREAT SERPL: 18.2 (ref 10–20)
CALCIUM BLD-MCNC: 9.7 MG/DL (ref 8.7–10.4)
CHLORIDE SERPL-SCNC: 107 MMOL/L (ref 98–112)
CO2 SERPL-SCNC: 28 MMOL/L (ref 21–32)
CREAT BLD-MCNC: 0.77 MG/DL
DEPRECATED RDW RBC AUTO: 43.8 FL (ref 35.1–46.3)
EGFRCR SERPLBLD CKD-EPI 2021: 88 ML/MIN/1.73M2 (ref 60–?)
EOSINOPHIL # BLD AUTO: 0.25 X10(3) UL (ref 0–0.7)
EOSINOPHIL NFR BLD AUTO: 3.7 %
ERYTHROCYTE [DISTWIDTH] IN BLOOD BY AUTOMATED COUNT: 12.9 % (ref 11–15)
FASTING STATUS PATIENT QL REPORTED: YES
GLOBULIN PLAS-MCNC: 3.3 G/DL (ref 2.8–4.4)
GLUCOSE BLD-MCNC: 103 MG/DL (ref 70–99)
HCT VFR BLD AUTO: 45.5 %
HGB BLD-MCNC: 15.2 G/DL
IMM GRANULOCYTES # BLD AUTO: 0.01 X10(3) UL (ref 0–1)
IMM GRANULOCYTES NFR BLD: 0.1 %
LYMPHOCYTES # BLD AUTO: 2.77 X10(3) UL (ref 1–4)
LYMPHOCYTES NFR BLD AUTO: 40.6 %
MCH RBC QN AUTO: 30.8 PG (ref 26–34)
MCHC RBC AUTO-ENTMCNC: 33.4 G/DL (ref 31–37)
MCV RBC AUTO: 92.3 FL
MONOCYTES # BLD AUTO: 0.45 X10(3) UL (ref 0.1–1)
MONOCYTES NFR BLD AUTO: 6.6 %
NEUTROPHILS # BLD AUTO: 3.32 X10 (3) UL (ref 1.5–7.7)
NEUTROPHILS # BLD AUTO: 3.32 X10(3) UL (ref 1.5–7.7)
NEUTROPHILS NFR BLD AUTO: 48.7 %
OSMOLALITY SERPL CALC.SUM OF ELEC: 291 MOSM/KG (ref 275–295)
PLATELET # BLD AUTO: 252 10(3)UL (ref 150–450)
POTASSIUM SERPL-SCNC: 4.4 MMOL/L (ref 3.5–5.1)
PROT SERPL-MCNC: 7.8 G/DL (ref 5.7–8.2)
RBC # BLD AUTO: 4.93 X10(6)UL
SODIUM SERPL-SCNC: 140 MMOL/L (ref 136–145)
WBC # BLD AUTO: 6.8 X10(3) UL (ref 4–11)

## 2024-03-01 PROCEDURE — 85025 COMPLETE CBC W/AUTO DIFF WBC: CPT

## 2024-03-01 PROCEDURE — 36415 COLL VENOUS BLD VENIPUNCTURE: CPT

## 2024-03-01 PROCEDURE — 71046 X-RAY EXAM CHEST 2 VIEWS: CPT | Performed by: OBSTETRICS & GYNECOLOGY

## 2024-03-01 PROCEDURE — 80053 COMPREHEN METABOLIC PANEL: CPT

## 2024-03-07 ENCOUNTER — TELEPHONE (OUTPATIENT)
Dept: INTERNAL MEDICINE CLINIC | Facility: CLINIC | Age: 64
End: 2024-03-07

## 2024-03-07 NOTE — TELEPHONE ENCOUNTER
Called Dr Mcmahon office , spoke with , call transferred to surgical coordinator  but no answer     Pre-op exam faxed to [phone]  per right fax     Called patient and informed that fax was sent,  the patient will reach out to  Dr Mcmahon office tomorrow to ensure fax was received

## 2024-03-07 NOTE — TELEPHONE ENCOUNTER
There are no medical contraindications to proceeding with the planned surgery.    Please refer to my note on February 16 for medical clearance.  Subsequent testing showed a normal CMP and CBC.  Chest x-ray showed moderate cardiomegaly which had not changed.    She was seen by Dr. Macdonald of cardiology on February 23.  He also gave clearance for both the colonoscopy and the hysterectomy.  The assessment and plan from the note of Dr. Macdonald from February 23 is as follows:  \"ASSESSMENT    Continue current medication  Do not stop Plavix for colonoscopy  Okay to hold Plavix for 5 days prior to the hysterectomy due to risk of cancer. Risk of stent thrombosis discussed with the patient at length.  Restart Plavix as soon as possible after the hysterectomy  Okay to proceed with hysterectomy surgery without further testing  Follow-up with Dr. Borrero as scheduled  Follow-up with me as needed\"

## 2024-03-07 NOTE — TELEPHONE ENCOUNTER
Patient called stating that she completed a pre-op appt on 02/16/2024 and she is scheduled for surgery with Phan Melvin on 03/14/2024. She states his office informed her that they have reached out multiple times requesting that her medical clearance is faxed to their office... I do not see anything on her chart about the request to have the clearance sent. Per patient can her clearance be sent to  office ASAP.    She would like a callback when this is completed.

## 2024-03-12 ENCOUNTER — LAB ENCOUNTER (OUTPATIENT)
Dept: LAB | Age: 64
End: 2024-03-12
Attending: OBSTETRICS & GYNECOLOGY
Payer: COMMERCIAL

## 2024-03-12 DIAGNOSIS — Z01.818 PRE-OP TESTING: ICD-10-CM

## 2024-03-12 LAB
ANTIBODY SCREEN: NEGATIVE
RH BLOOD TYPE: POSITIVE
RH BLOOD TYPE: POSITIVE

## 2024-03-12 PROCEDURE — 86850 RBC ANTIBODY SCREEN: CPT

## 2024-03-12 PROCEDURE — 86900 BLOOD TYPING SEROLOGIC ABO: CPT

## 2024-03-12 PROCEDURE — 36415 COLL VENOUS BLD VENIPUNCTURE: CPT

## 2024-03-12 PROCEDURE — 86901 BLOOD TYPING SEROLOGIC RH(D): CPT

## 2024-04-11 ENCOUNTER — LAB ENCOUNTER (OUTPATIENT)
Dept: LAB | Age: 64
End: 2024-04-11
Attending: OBSTETRICS & GYNECOLOGY
Payer: COMMERCIAL

## 2024-04-11 ENCOUNTER — ANESTHESIA EVENT (OUTPATIENT)
Dept: SURGERY | Facility: HOSPITAL | Age: 64
End: 2024-04-11
Payer: COMMERCIAL

## 2024-04-11 DIAGNOSIS — Z01.818 PRE-OP TESTING: ICD-10-CM

## 2024-04-11 LAB
ANTIBODY SCREEN: NEGATIVE
RH BLOOD TYPE: POSITIVE

## 2024-04-11 PROCEDURE — 86850 RBC ANTIBODY SCREEN: CPT

## 2024-04-11 PROCEDURE — 86900 BLOOD TYPING SEROLOGIC ABO: CPT

## 2024-04-11 PROCEDURE — 36415 COLL VENOUS BLD VENIPUNCTURE: CPT

## 2024-04-11 PROCEDURE — 86901 BLOOD TYPING SEROLOGIC RH(D): CPT

## 2024-04-12 ENCOUNTER — ANESTHESIA (OUTPATIENT)
Dept: SURGERY | Facility: HOSPITAL | Age: 64
End: 2024-04-12
Payer: COMMERCIAL

## 2024-04-12 ENCOUNTER — HOSPITAL ENCOUNTER (INPATIENT)
Facility: HOSPITAL | Age: 64
LOS: 2 days | Discharge: HOME | End: 2024-04-14
Attending: OBSTETRICS & GYNECOLOGY | Admitting: OBSTETRICS & GYNECOLOGY
Payer: COMMERCIAL

## 2024-04-12 DIAGNOSIS — Z01.818 PRE-OP TESTING: Primary | ICD-10-CM

## 2024-04-12 DIAGNOSIS — G89.18 POST-OP PAIN: ICD-10-CM

## 2024-04-12 PROBLEM — I10 ESSENTIAL HYPERTENSION: Status: ACTIVE | Noted: 2024-04-12

## 2024-04-12 PROBLEM — D21.9 FIBROID TUMOR: Status: ACTIVE | Noted: 2024-04-12

## 2024-04-12 PROCEDURE — 0UT70ZZ RESECTION OF BILATERAL FALLOPIAN TUBES, OPEN APPROACH: ICD-10-PCS | Performed by: OBSTETRICS & GYNECOLOGY

## 2024-04-12 PROCEDURE — 3E0T3BZ INTRODUCTION OF ANESTHETIC AGENT INTO PERIPHERAL NERVES AND PLEXI, PERCUTANEOUS APPROACH: ICD-10-PCS | Performed by: STUDENT IN AN ORGANIZED HEALTH CARE EDUCATION/TRAINING PROGRAM

## 2024-04-12 PROCEDURE — 99222 1ST HOSP IP/OBS MODERATE 55: CPT | Performed by: HOSPITALIST

## 2024-04-12 PROCEDURE — 0UT20ZZ RESECTION OF BILATERAL OVARIES, OPEN APPROACH: ICD-10-PCS | Performed by: OBSTETRICS & GYNECOLOGY

## 2024-04-12 PROCEDURE — 0UT90ZZ RESECTION OF UTERUS, OPEN APPROACH: ICD-10-PCS | Performed by: OBSTETRICS & GYNECOLOGY

## 2024-04-12 RX ORDER — ONDANSETRON 2 MG/ML
INJECTION INTRAMUSCULAR; INTRAVENOUS AS NEEDED
Status: DISCONTINUED | OUTPATIENT
Start: 2024-04-12 | End: 2024-04-12 | Stop reason: SURG

## 2024-04-12 RX ORDER — HYDROMORPHONE HYDROCHLORIDE 1 MG/ML
0.2 INJECTION, SOLUTION INTRAMUSCULAR; INTRAVENOUS; SUBCUTANEOUS EVERY 5 MIN PRN
Status: DISCONTINUED | OUTPATIENT
Start: 2024-04-12 | End: 2024-04-12 | Stop reason: HOSPADM

## 2024-04-12 RX ORDER — PHENYLEPHRINE HCL 10 MG/ML
VIAL (ML) INJECTION AS NEEDED
Status: DISCONTINUED | OUTPATIENT
Start: 2024-04-12 | End: 2024-04-12 | Stop reason: SURG

## 2024-04-12 RX ORDER — SODIUM CHLORIDE, SODIUM LACTATE, POTASSIUM CHLORIDE, CALCIUM CHLORIDE 600; 310; 30; 20 MG/100ML; MG/100ML; MG/100ML; MG/100ML
INJECTION, SOLUTION INTRAVENOUS CONTINUOUS
Status: DISCONTINUED | OUTPATIENT
Start: 2024-04-12 | End: 2024-04-14

## 2024-04-12 RX ORDER — ONDANSETRON 4 MG/1
4 TABLET, FILM COATED ORAL EVERY 8 HOURS PRN
Status: DISCONTINUED | OUTPATIENT
Start: 2024-04-12 | End: 2024-04-14

## 2024-04-12 RX ORDER — ACETAMINOPHEN 500 MG
1000 TABLET ORAL ONCE
Status: COMPLETED | OUTPATIENT
Start: 2024-04-12 | End: 2024-04-12

## 2024-04-12 RX ORDER — NALOXONE HYDROCHLORIDE 0.4 MG/ML
INJECTION, SOLUTION INTRAMUSCULAR; INTRAVENOUS; SUBCUTANEOUS AS NEEDED
Status: DISCONTINUED | OUTPATIENT
Start: 2024-04-12 | End: 2024-04-12 | Stop reason: SURG

## 2024-04-12 RX ORDER — KETOROLAC TROMETHAMINE 30 MG/ML
INJECTION, SOLUTION INTRAMUSCULAR; INTRAVENOUS AS NEEDED
Status: DISCONTINUED | OUTPATIENT
Start: 2024-04-12 | End: 2024-04-12 | Stop reason: SURG

## 2024-04-12 RX ORDER — DEXAMETHASONE SODIUM PHOSPHATE 4 MG/ML
VIAL (ML) INJECTION AS NEEDED
Status: DISCONTINUED | OUTPATIENT
Start: 2024-04-12 | End: 2024-04-12 | Stop reason: SURG

## 2024-04-12 RX ORDER — HYDROCODONE BITARTRATE AND ACETAMINOPHEN 5; 325 MG/1; MG/1
1 TABLET ORAL EVERY 6 HOURS PRN
Status: DISCONTINUED | OUTPATIENT
Start: 2024-04-12 | End: 2024-04-14

## 2024-04-12 RX ORDER — ONDANSETRON 2 MG/ML
4 INJECTION INTRAMUSCULAR; INTRAVENOUS EVERY 8 HOURS PRN
Status: DISCONTINUED | OUTPATIENT
Start: 2024-04-12 | End: 2024-04-14

## 2024-04-12 RX ORDER — NALOXONE HYDROCHLORIDE 0.4 MG/ML
0.08 INJECTION, SOLUTION INTRAMUSCULAR; INTRAVENOUS; SUBCUTANEOUS AS NEEDED
Status: DISCONTINUED | OUTPATIENT
Start: 2024-04-12 | End: 2024-04-12 | Stop reason: HOSPADM

## 2024-04-12 RX ORDER — GLYCOPYRROLATE 0.2 MG/ML
INJECTION, SOLUTION INTRAMUSCULAR; INTRAVENOUS AS NEEDED
Status: DISCONTINUED | OUTPATIENT
Start: 2024-04-12 | End: 2024-04-12 | Stop reason: SURG

## 2024-04-12 RX ORDER — SODIUM CHLORIDE 9 MG/ML
INJECTION, SOLUTION INTRAVENOUS CONTINUOUS PRN
Status: DISCONTINUED | OUTPATIENT
Start: 2024-04-12 | End: 2024-04-12 | Stop reason: SURG

## 2024-04-12 RX ORDER — HYDROMORPHONE HYDROCHLORIDE 1 MG/ML
0.6 INJECTION, SOLUTION INTRAMUSCULAR; INTRAVENOUS; SUBCUTANEOUS EVERY 5 MIN PRN
Status: DISCONTINUED | OUTPATIENT
Start: 2024-04-12 | End: 2024-04-12 | Stop reason: HOSPADM

## 2024-04-12 RX ORDER — ENOXAPARIN SODIUM 100 MG/ML
40 INJECTION SUBCUTANEOUS DAILY
Status: DISCONTINUED | OUTPATIENT
Start: 2024-04-13 | End: 2024-04-14

## 2024-04-12 RX ORDER — HYDROMORPHONE HYDROCHLORIDE 1 MG/ML
0.4 INJECTION, SOLUTION INTRAMUSCULAR; INTRAVENOUS; SUBCUTANEOUS EVERY 5 MIN PRN
Status: DISCONTINUED | OUTPATIENT
Start: 2024-04-12 | End: 2024-04-12 | Stop reason: HOSPADM

## 2024-04-12 RX ORDER — ROCURONIUM BROMIDE 10 MG/ML
INJECTION, SOLUTION INTRAVENOUS AS NEEDED
Status: DISCONTINUED | OUTPATIENT
Start: 2024-04-12 | End: 2024-04-12 | Stop reason: SURG

## 2024-04-12 RX ORDER — LOSARTAN POTASSIUM 50 MG/1
50 TABLET ORAL NIGHTLY
Status: CANCELLED | OUTPATIENT
Start: 2024-04-12

## 2024-04-12 RX ORDER — ATORVASTATIN CALCIUM 40 MG/1
40 TABLET, FILM COATED ORAL NIGHTLY
Status: CANCELLED | OUTPATIENT
Start: 2024-04-12

## 2024-04-12 RX ORDER — EPHEDRINE SULFATE 50 MG/ML
INJECTION, SOLUTION INTRAVENOUS AS NEEDED
Status: DISCONTINUED | OUTPATIENT
Start: 2024-04-12 | End: 2024-04-12 | Stop reason: SURG

## 2024-04-12 RX ORDER — IBUPROFEN 600 MG/1
600 TABLET ORAL EVERY 8 HOURS PRN
Qty: 60 TABLET | Refills: 0 | Status: SHIPPED | OUTPATIENT
Start: 2024-04-12

## 2024-04-12 RX ORDER — DOCUSATE SODIUM 100 MG/1
100 CAPSULE, LIQUID FILLED ORAL 2 TIMES DAILY PRN
Qty: 60 CAPSULE | Refills: 0 | Status: SHIPPED | OUTPATIENT
Start: 2024-04-12

## 2024-04-12 RX ORDER — ROPIVACAINE HYDROCHLORIDE 5 MG/ML
INJECTION, SOLUTION EPIDURAL; INFILTRATION; PERINEURAL
Status: COMPLETED | OUTPATIENT
Start: 2024-04-12 | End: 2024-04-12

## 2024-04-12 RX ORDER — MORPHINE SULFATE 4 MG/ML
4 INJECTION, SOLUTION INTRAMUSCULAR; INTRAVENOUS EVERY 2 HOUR PRN
Status: DISCONTINUED | OUTPATIENT
Start: 2024-04-12 | End: 2024-04-14

## 2024-04-12 RX ORDER — LIDOCAINE HYDROCHLORIDE 10 MG/ML
INJECTION, SOLUTION EPIDURAL; INFILTRATION; INTRACAUDAL; PERINEURAL AS NEEDED
Status: DISCONTINUED | OUTPATIENT
Start: 2024-04-12 | End: 2024-04-12 | Stop reason: SURG

## 2024-04-12 RX ORDER — MORPHINE SULFATE 2 MG/ML
1 INJECTION, SOLUTION INTRAMUSCULAR; INTRAVENOUS EVERY 2 HOUR PRN
Status: DISCONTINUED | OUTPATIENT
Start: 2024-04-12 | End: 2024-04-14

## 2024-04-12 RX ORDER — CEFAZOLIN SODIUM/WATER 2 G/20 ML
2 SYRINGE (ML) INTRAVENOUS ONCE
Status: COMPLETED | OUTPATIENT
Start: 2024-04-12 | End: 2024-04-12

## 2024-04-12 RX ORDER — METRONIDAZOLE 500 MG/100ML
INJECTION, SOLUTION INTRAVENOUS AS NEEDED
Status: DISCONTINUED | OUTPATIENT
Start: 2024-04-12 | End: 2024-04-12 | Stop reason: SURG

## 2024-04-12 RX ORDER — HYDROCODONE BITARTRATE AND ACETAMINOPHEN 5; 325 MG/1; MG/1
1 TABLET ORAL EVERY 6 HOURS PRN
Qty: 20 TABLET | Refills: 0 | Status: SHIPPED | OUTPATIENT
Start: 2024-04-12 | End: 2024-04-14

## 2024-04-12 RX ORDER — MIDAZOLAM HYDROCHLORIDE 1 MG/ML
INJECTION INTRAMUSCULAR; INTRAVENOUS AS NEEDED
Status: DISCONTINUED | OUTPATIENT
Start: 2024-04-12 | End: 2024-04-12 | Stop reason: SURG

## 2024-04-12 RX ORDER — SODIUM CHLORIDE, SODIUM LACTATE, POTASSIUM CHLORIDE, CALCIUM CHLORIDE 600; 310; 30; 20 MG/100ML; MG/100ML; MG/100ML; MG/100ML
INJECTION, SOLUTION INTRAVENOUS CONTINUOUS
Status: DISCONTINUED | OUTPATIENT
Start: 2024-04-12 | End: 2024-04-12 | Stop reason: HOSPADM

## 2024-04-12 RX ORDER — MORPHINE SULFATE 2 MG/ML
2 INJECTION, SOLUTION INTRAMUSCULAR; INTRAVENOUS EVERY 2 HOUR PRN
Status: DISCONTINUED | OUTPATIENT
Start: 2024-04-12 | End: 2024-04-14

## 2024-04-12 RX ORDER — METOPROLOL SUCCINATE 25 MG/1
25 TABLET, EXTENDED RELEASE ORAL NIGHTLY
Status: CANCELLED | OUTPATIENT
Start: 2024-04-12

## 2024-04-12 RX ORDER — HYDROMORPHONE HYDROCHLORIDE 1 MG/ML
INJECTION, SOLUTION INTRAMUSCULAR; INTRAVENOUS; SUBCUTANEOUS AS NEEDED
Status: DISCONTINUED | OUTPATIENT
Start: 2024-04-12 | End: 2024-04-12 | Stop reason: SURG

## 2024-04-12 RX ADMIN — ROPIVACAINE HYDROCHLORIDE 10 ML: 5 INJECTION, SOLUTION EPIDURAL; INFILTRATION; PERINEURAL at 17:44:00

## 2024-04-12 RX ADMIN — GLYCOPYRROLATE 0.2 MG: 0.2 INJECTION, SOLUTION INTRAMUSCULAR; INTRAVENOUS at 16:13:00

## 2024-04-12 RX ADMIN — NALOXONE HYDROCHLORIDE 0.08 MG: 0.4 INJECTION, SOLUTION INTRAMUSCULAR; INTRAVENOUS; SUBCUTANEOUS at 18:13:00

## 2024-04-12 RX ADMIN — ROCURONIUM BROMIDE 50 MG: 10 INJECTION, SOLUTION INTRAVENOUS at 15:32:00

## 2024-04-12 RX ADMIN — EPHEDRINE SULFATE 5 MG: 50 INJECTION, SOLUTION INTRAVENOUS at 16:13:00

## 2024-04-12 RX ADMIN — ROCURONIUM BROMIDE 10 MG: 10 INJECTION, SOLUTION INTRAVENOUS at 16:52:00

## 2024-04-12 RX ADMIN — LIDOCAINE HYDROCHLORIDE 50 MG: 10 INJECTION, SOLUTION EPIDURAL; INFILTRATION; INTRACAUDAL; PERINEURAL at 15:32:00

## 2024-04-12 RX ADMIN — MIDAZOLAM HYDROCHLORIDE 2 MG: 1 INJECTION INTRAMUSCULAR; INTRAVENOUS at 15:28:00

## 2024-04-12 RX ADMIN — NALOXONE HYDROCHLORIDE 0.02 MG: 0.4 INJECTION, SOLUTION INTRAMUSCULAR; INTRAVENOUS; SUBCUTANEOUS at 18:10:00

## 2024-04-12 RX ADMIN — METRONIDAZOLE 500 MG: 500 INJECTION, SOLUTION INTRAVENOUS at 15:37:00

## 2024-04-12 RX ADMIN — EPHEDRINE SULFATE 5 MG: 50 INJECTION, SOLUTION INTRAVENOUS at 16:45:00

## 2024-04-12 RX ADMIN — PHENYLEPHRINE HCL 100 MCG: 10 MG/ML VIAL (ML) INJECTION at 16:11:00

## 2024-04-12 RX ADMIN — ONDANSETRON 4 MG: 2 INJECTION INTRAMUSCULAR; INTRAVENOUS at 17:10:00

## 2024-04-12 RX ADMIN — NALOXONE HYDROCHLORIDE 0.04 MG: 0.4 INJECTION, SOLUTION INTRAMUSCULAR; INTRAVENOUS; SUBCUTANEOUS at 18:12:00

## 2024-04-12 RX ADMIN — KETOROLAC TROMETHAMINE 15 MG: 30 INJECTION, SOLUTION INTRAMUSCULAR; INTRAVENOUS at 17:15:00

## 2024-04-12 RX ADMIN — SODIUM CHLORIDE, SODIUM LACTATE, POTASSIUM CHLORIDE, CALCIUM CHLORIDE: 600; 310; 30; 20 INJECTION, SOLUTION INTRAVENOUS at 17:51:00

## 2024-04-12 RX ADMIN — HYDROMORPHONE HYDROCHLORIDE 0.5 MG: 1 INJECTION, SOLUTION INTRAMUSCULAR; INTRAVENOUS; SUBCUTANEOUS at 16:26:00

## 2024-04-12 RX ADMIN — CEFAZOLIN SODIUM/WATER 2 G: 2 G/20 ML SYRINGE (ML) INTRAVENOUS at 15:37:00

## 2024-04-12 RX ADMIN — ROPIVACAINE HYDROCHLORIDE 10 ML: 5 INJECTION, SOLUTION EPIDURAL; INFILTRATION; PERINEURAL at 17:48:00

## 2024-04-12 RX ADMIN — SODIUM CHLORIDE: 9 INJECTION, SOLUTION INTRAVENOUS at 15:36:00

## 2024-04-12 RX ADMIN — DEXAMETHASONE SODIUM PHOSPHATE 4 MG: 4 MG/ML VIAL (ML) INJECTION at 15:43:00

## 2024-04-12 NOTE — ANESTHESIA PREPROCEDURE EVALUATION
Anesthesia PreOp Note    HPI:     Kirstin Sanders is a 64 year old female who presents for preoperative consultation requested by: Phan Mcmahon DO    Date of Surgery: 4/12/2024    Procedure(s):  Exploratory laparotomy with Pfannenstiel incision, total abdominal hysterectomy, bilateral salpingo oophorectomy, possible pelvic and para-aortic lymph node dissection, possible staging, possible bilateral ureterolysis  Indication: Uterine mass    Relevant Problems   No relevant active problems       NPO:  Last Liquid Consumption Date: 04/11/24 (4 sips of water)  Last Liquid Consumption Time: 0800  Last Solid Consumption Date: 04/11/24  Last Solid Consumption Time: 1800  Last Liquid Consumption Date: 04/11/24 (4 sips of water)          History Review:  Patient Active Problem List    Diagnosis Date Noted    New onset of headaches after age 52 12/22/2023    Neuropathic pain of right lower extremity 12/22/2023    Loss of perception for temperature 11/04/2021    Dysphagia 11/04/2021    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    Dilated cardiomyopathy (HCC) 07/25/2017    Hypercholesteremia 07/25/2017    Atrial fibrillation (HCC) 06/18/2017       Past Medical History:    A-fib (HCC)    Cardiomyopathy (HCC)    EF 45%    Heart failure (HCC)    High blood pressure    High cholesterol    Shoulder fracture, left    Surgical repair; ok since     Visual impairment    contacts       Past Surgical History:   Procedure Laterality Date    Bunionectomy Bilateral     Cath bare metal stent (bms)      Colonoscopy N/A 02/26/2024    with polypectomy, clip x 3    Colonoscopy N/A 02/26/2024    Procedure: COLONOSCOPY with polypectomy, clip x 3;  Surgeon: Aashish Jeff MD;  Location: [facility] ENDOSCOPY    Other surgical history Right     shoulder and humerous fractures repaired    Other surgical history Bilateral     ear pinning    Other surgical history Left     Ear stapectomy,failed    Pt w/ coronary artery stent  01/18/2024        Medications Prior to Admission   Medication Sig Dispense Refill Last Dose    clopidogrel 75 MG Oral Tab Take 1 tablet (75 mg total) by mouth daily. 90 tablet 3 4/7/2024    atorvastatin 40 MG Oral Tab Take 1 tablet (40 mg total) by mouth nightly. 90 tablet 3 4/11/2024 at 2200    losartan 50 MG Oral Tab Take 1 tablet (50 mg total) by mouth nightly.   4/10/2024    ELIQUIS 5 MG Oral Tab TK 1 T PO  BID  5 4/8/2024    Metoprolol Succinate ER 50 MG Oral Tablet 24 Hr Take 0.5 tablets (25 mg total) by mouth nightly.   4/11/2024 at 2200     Current Facility-Administered Medications Ordered in Epic   Medication Dose Route Frequency Provider Last Rate Last Admin    lactated ringers infusion   Intravenous Continuous Phan Mcmahon DO 20 mL/hr at 04/12/24 1203 New Bag at 04/12/24 1203    metoprolol tartrate (Lopressor) tab 25 mg  25 mg Oral Once PRN Phan Mcmahon DO        ceFAZolin (Ancef) 2 g in 20mL IV syringe premix  2 g Intravenous Once Phan Mcmahon, DO         No current [facility]-ordered outpatient medications on file.       Allergies   Allergen Reactions    Hctz [Hydrochlorothiazide] SHORTNESS OF BREATH    Lisinopril SHORTNESS OF BREATH    Pcn [Penicillins] UNKNOWN and SHORTNESS OF BREATH     DYSPNEA  Since infancy  Couldn't breathe. Patient is unsure if any skin blistering or organ involvement       Family History   Problem Relation Age of Onset    Heart Disorder Father         Heart valve disease    Cancer Mother 61        ovarian    Ovarian Cancer Mother 61    Breast Cancer Paternal Aunt         50s     Social History     Socioeconomic History    Marital status:    Tobacco Use    Smoking status: Never    Smokeless tobacco: Never   Vaping Use    Vaping status: Never Used   Substance and Sexual Activity    Alcohol use: Yes     Alcohol/week: 2.0 standard drinks of alcohol     Types: 1 Glasses of wine, 1 Cans of beer per week     Comment: Weekend max 2    Drug use: No    Sexual activity: Yes      Partners: Male   Other Topics Concern    Caffeine Concern No    Exercise No    Right Handed Yes       Available pre-op labs reviewed.  Lab Results   Component Value Date    WBC 6.8 03/01/2024    RBC 4.93 03/01/2024    HGB 15.2 03/01/2024    HCT 45.5 03/01/2024    MCV 92.3 03/01/2024    MCH 30.8 03/01/2024    MCHC 33.4 03/01/2024    RDW 12.9 03/01/2024    .0 03/01/2024     Lab Results   Component Value Date     03/01/2024    K 4.4 03/01/2024     03/01/2024    CO2 28.0 03/01/2024    BUN 14 03/01/2024    CREATSERUM 0.77 03/01/2024     (H) 03/01/2024    CA 9.7 03/01/2024          Vital Signs:  Body mass index is 23.03 kg/m².   height is 1.6 m (5' 3\") and weight is 59 kg (130 lb). Her oral temperature is 97.5 °F (36.4 °C). Her blood pressure is 126/78 and her pulse is 104. Her respiration is 18 and oxygen saturation is 98%.   Vitals:    03/08/24 1007 04/08/24 1717 04/12/24 1159   BP:   126/78   Pulse:   104   Resp:   18   Temp:   97.5 °F (36.4 °C)   TempSrc:   Oral   SpO2:   98%   Weight: 59 kg (130 lb) 59.4 kg (131 lb) 59 kg (130 lb)   Height: 1.6 m (5' 3\") 1.6 m (5' 3\") 1.6 m (5' 3\")        Anesthesia Evaluation     Patient summary reviewed and Nursing notes reviewed    Airway   Mallampati: II  Dental      Pulmonary     breath sounds clear to auscultation  Cardiovascular   (+) hypertension, dysrhythmias    Rhythm: irregular  Rate: normal    Neuro/Psych      GI/Hepatic/Renal      Endo/Other    Abdominal                  Anesthesia Plan:   ASA:  3  Plan:   General  Airway:  ETT  Post-op Pain Management: TAP block      I have informed Kirstin Ludwignisreen Sanders and/or legal guardian or family member of the nature of the anesthetic plan, benefits, risks including possible dental damage if relevant, major complications, and any alternative forms of anesthetic management.   All of the patient's questions were answered to the best of my ability. The patient desires the anesthetic management as  planned.  Olesya Christina MD  4/12/2024 1:21 PM  Present on Admission:  **None**

## 2024-04-12 NOTE — OPERATIVE REPORT
[facility] OPERATING ROOM  Operative Note     Kirstin Sanders Location: OR   CSN [id number] MRN [id number]   Admission Date 4/12/2024 Operation Date 4/12/2024   Attending Physician Phan Rueda DO Operating Physician PHAN RUEDA DO      Preoperative Diagnosis: Uterine mass     Postoperative Diagnosis: Uterine mass     Procedure Performed:   Exploratory laparotomy with Pfannenstiel incision, total abdominal hysterectomy, bilateral salpingo oophorectomy       Primary Surgeon: PHAN RUEDA DO      Assistant: Jamilah Saenz     Surgical Findings: Patient had a uterine fibroid that continued to increase in size over the past several years.  Patient was beginning to have pelvic pressure, pelvic pain, dyspareunia, increased frequency of urination.  On exam it measured approximately 14 x 12 x 12 cm.  It was mobile.  Intraoperatively there was no evidence of malignancy with her ovaries atrophic.  The surgery was completely uneventful and the frozen section did come back as benign.     Anesthesia: General     Complications: None     Implants: * No implants in log *     Specimen: Pelvic washings, uterus cervix tubes and ovaries     Drains: None     Condition: Stable     Estimated Blood Loss: 50    Urine Output:: 200 cc    Fluids: 1500     Summary of Case:  Patient was taken back to the operating room and placed in a lithotomy position.  She then prepared and draped in the normal sterile fashion in dorsal lithotomy position.  Next a Garner catheter was placed under sterile conditions.  Next a Pfannenstiel skin incision was made approximately 2 cm above the umbilicus measuring approximately 15 cm.  The incision was extended down to the fascia using the cautery.  The fascial incision was then created and extended laterally to near the ASIS.  The fascia was then tented up and the rectus muscle was then dissected off of the rectus muscle superiorly and inferiorly.  Next the rectus muscle  midline  and the peritoneum was identified and entered bluntly.  Peritoneal incision was extended superiorly and inferiorly with good visualization of the bladder.       Next the Bookwalter was then used as our self-retaining retractor and I made every effort to use as little pressure as possible to minimize risk of femoral nerve palsy.  The bowels were then packed with moist laparotomy sponges.  Next 2 curved Ace were placed on the cornea of the uterus and the uterus tented up and pulled out of the pelvis.  The IP ligaments were identified and a defect was created lateral to her IP ligament and the pararectal space was then opened.  The round ligament was then isolated cauterized and transected with more naun in the body.  Next the ureters were identified and I created a defect in the medial leaf of the broad ligament between the IP ligament and the ureter.  The IP ligament was then cauterized in multiple areas and transected with more naun in the body.  Next the right angle was then used to create the bladder flap using monopolar cautery as well as sharp and blunt dissection.  The vesicouterine space was then created and extended all the way down to the cervicovaginal junction.  The ureters were then we identified and felt to be out of harm's way.  2 curved Z-Clamp's were placed around the uterine vessels at the level of the internal cervical os, clamped transected and ligated.  The cardinal ligaments were then grasped medial to the uterine vessels clamped transected and ligated.  This occurred all the way down to the cervicovaginal junction at which time with the bladder being fully out of harm's way, 2 curved Z-Clamp's were placed around the proximal vagina.  The Mulu scissors were then used to create the colpotomy with the uterus cervix tubes and ovaries sent to pathology en bloc.  The vaginal cuff was then closed using an 0 Vicryl in a Rishi fashion as well as multiple figure-of-eight stitches.     I did copiously  irrigate and I felt hemostasis was assured.  I reidentified all my pedicles as well as the ureters and felt that there was definitely no damage.  Once the frozen section came back negative, I placed snow in all the retroperitoneal spaces including the pararectal and vesicovaginal space.  We observe for another 5 minutes and felt that hemostasis was assured.  All the labs were then removed along with the Bookwalter retractor.  The peritoneum was then closed using a 2-0 Vicryl in a running fashion.  The fascia was closed using a #1 looped PDS in a running fashion.  The subcutaneous fat was closed using a 3-0 Vicryl in an interrupted fashion.  The skin was closed using a 4-0 Monocryl in a running subcuticular fashion.  Dermabond was then placed over the skin.  This end dictation I was present scrubbed and are performed the entire procedure from skin to skin.         MIROSLAVA RUEDA,   4/12/2024  5:21 PM

## 2024-04-12 NOTE — ANESTHESIA PROCEDURE NOTES
Peripheral Block    Date/Time: 4/12/2024 5:44 PM    Performed by: James Giron MD  Authorized by: James Giron MD      General Information and Staff    Start Time:  4/12/2024 5:44 PM  End Time:  4/12/2024 5:49 PM  Anesthesiologist:  James Giron MD  Performed by:  Anesthesiologist  Patient Location:  PACU    Block Placement: Post Induction  Site Identification: real time ultrasound guided and image stored and retrievable      Reason for Block: post-op pain management    Preanesthetic Checklist: 2 patient identifers, IV checked, risks and benefits discussed, monitors and equipment checked, pre-op evaluation, timeout performed, anesthesia consent and sterile technique used      Procedure Details    Patient Position:  Supine  Prep: ChloraPrep    Monitoring:  Cardiac monitor, heart rate, continuous pulse ox and blood pressure cuff  Block Type:  TAP  Laterality:  Bilateral  Injection Technique:  Single-shot    Needle    Needle Type:   Needle Gauge:  21 G  Needle Length:  100 mm  Needle Localization:  Ultrasound guidance  Reason for Ultrasound Use: appropriate spread of the medication was noted in real time and no ultrasound evidence of intravascular and/or intraneural injection            Assessment    Injection Assessment:  Good spread noted, incremental injection, low pressure, local visualized surrounding nerve on ultrasound, negative aspiration for heme and no pain on injection  Paresthesia Pain:  None  Heart Rate Change: No        Medications  4/12/2024 5:44 PM  ropivacaine (NAROPIN) injection 0.5% - Infiltration   10 mL - 4/12/2024 5:44:00 PM   10 mL - 4/12/2024 5:48:00 PM    Additional Comments    20mL of 0.25% ropivacaine bilaterally for total of 40mL/100mg of ropivacaine

## 2024-04-12 NOTE — ANESTHESIA PROCEDURE NOTES
Airway  Date/Time: 4/12/2024 3:34 PM  Urgency: elective      General Information and Staff    Patient location during procedure: OR  Anesthesiologist: Olesya Christina MD  Performed: anesthesiologist   Performed by: Olesya Christina MD  Authorized by: Olesya Christina MD      Indications and Patient Condition  Indications for airway management: anesthesia  Sedation level: deep  Preoxygenated: yes  Patient position: sniffing  Mask difficulty assessment: 1 - vent by mask    Final Airway Details  Final airway type: endotracheal airway      Successful airway: ETT  Cuffed: yes   Successful intubation technique: direct laryngoscopy  Endotracheal tube insertion site: oral  ETT size (mm): 7.0    Placement verified by: capnometry   Measured from: lips  ETT to lips (cm): 21  Number of attempts at approach: 1  Number of other approaches attempted: 0

## 2024-04-12 NOTE — ANESTHESIA POSTPROCEDURE EVALUATION
Patient: Kirstin Sanders    Procedure Summary       Date: 04/12/24 Room / Location: [facility] MAIN OR 06 / [facility] MAIN OR    Anesthesia Start: 1528 Anesthesia Stop: 1823    Procedure: Exploratory laparotomy with Pfannenstiel incision, total abdominal hysterectomy, bilateral salpingo oophorectomy (Bilateral: Uterus) Diagnosis: (Uterine mass)    Surgeons: Phan Mcmahon DO Anesthesiologist: James Giron MD    Anesthesia Type: general ASA Status: 3            Anesthesia Type: general    Vitals Value Taken Time   /73 04/12/24 1822   Temp 97.1 04/12/24 1823   Pulse 66 04/12/24 1823   Resp 11 04/12/24 1823   SpO2 94 % 04/12/24 1823   Vitals shown include unfiled device data.    [facility] AN Post Evaluation:   Patient Evaluated in PACU  Patient Participation: waiting for patient participation  Level of Consciousness: sleepy but conscious  Pain Score: 0  Pain Management: adequate  Airway Patency:patent  Dental exam unchanged from preop  Yes (Patient very slow to wake from GA)    Nausea/Vomiting: none  Cardiovascular Status: acceptable  Respiratory Status: acceptable  Postoperative Hydration acceptable      James Giron MD  4/12/2024 6:23 PM

## 2024-04-12 NOTE — CONSULTS
DIAGNOSIS:  Uterine fibroid  CHIEF COMPLAINT:    Enlarging fibroid uterus    HISTORY OF PRESENT ILLNESS:    Kirstin Sanders is a very pleasant   63 year old female who presents for consultation secondary to enlarged fibroid uterus.    Patient had MRI secondary to lower back pain which showed uterine mass measuring 9.2 x 8.3 x 8.6 cm, likely representing a fibroid. Follow-up US showed an enlarged fibroid uterus measuring 12.6 x 8.1 x 8.4 cm, with obscured endometrium, and a dominant fibroid measuring 7.0 x 9.0 x 8.4 cm. Given growth of fibroid over the last 8 years, patient was referred to gyn onc by ob gyn Dr. Dontae Monaoc for further management. She presents today for her initial visit.    Today, patient reports that overall she is doing well. She is experiencing intermittent pelvic pain, but this is infrequent. She does feel like she's had increased urinary frequency over the last few months, feels as though \"something is pushing on her bladder. Denies nausea/vomiting/fever/chills, no abdominal or pelvic pain, no vaginal bleeding or discharge.  No significant swelling of bilateral lower extremities reported.  Bowel movements are regular, denies constipation/diarrhea.   Appetite is stable.  No other issues reported. She is sexually active with her , no concerns.    Patient does have a significant family history of cancer: mother with ovarian, paternal aunt and cousins with breast, maternal grandfather with prostate. Patient reports she did do BRCA testing and was negative. No history of abnormal paps, most recent in 2024. She has never had a colonoscopy, but did do cologuard 3 years ago. Of note, patient has a significant cardiac history (a. fib, stent placement, CHF) due to genetics.       PAST CANCER HISTORY:           PAST MEDICAL HISTORY:  Uterine fibroid  PAST SURGICAL HISTORY:           PAST OB-GYN HISTORY:           PAST SOCIAL HISTORY:    (Reviewed - no changes required)  Marital:       Smoking Status: Smoking Tobacco : none found; Smokeless Tobacco : none found; Vaping : none found  Alcohol Consumption: None  Substance Abuse: None   Work History:        PAST FAMILY HISTORY:           CURRENT MEDICATIONS:      No current medication    ALLERGIES:           REVIEW OF SYSTEMS:    A 14 point review of systems was performed with any pertinent positives noted in the HPI and otherwise negative.    VITAL SIGNS:    Blood pressure: 108/68, Pulse: 100, Temperature: 97.2 F, Respirations: , O2 sat: 96%, Pain Scale: , Height: 63.75 in, Weight: 132.2 lb, BSA: 1.64, BMI: 22.87 kg/m2    PHYSICAL EXAM:    GENERAL: Alert and oriented x's 3.  Well appearing female in NAD.    HEENT: Pupils are equal and reactive without scleral icterus.  Normal cephalic, atraumatic,   PERRLA. EOM's intact bilaterally.    NECK: Trachea is midline.  Supple, no LAD, no thyromegaly.    BACK: No CVA or spinous tenderness.  LUNGS: CTAB, no wheezing.    CARDIAC: RRR, no murmurs.    ABDOMEN: Soft NTND, BS x's 4.    :    External Genitalia: Normal external genitalia. Hair distribution, no lesion.    Urethral Meatus: No lesions.    Urethra: No masses no nodularity.    Bladder: Fullness, no masses, no tenderness, no scarring.    Vagina: Smooth vaginal mucosa.    Cervix: Normal appearing cervix.    Uterus: Uterus is enlarged but mobile. Was able to palpate large centralized mass.     Adnexa: Do not appreciate any pelvic masses or nodularity.     Parametria: No nodularity  RECTAL: Deferred.    EXTREMITIES: No clubbing, cyanosis, or edema bilaterally.    NEUROLOGIC: Cranial nerves are grossly intact bilaterally.    PSYCHIATRIC: Appropriate mood and affect.    ECOG:        LABS/PATHOLOGY:    None available for review today    DIAGNOSTIC STUDIES REVIEWED:    MRI Pelvis 12/5/2023:  CONCLUSION:  1. Normal appearing lumbosacral plexus. No nerve root impingement or mass.  2. Uterus mass likely representing a fibroid measuring 9.2 x 8.3 x 8.6 cm.      US Pelvis 1/16/2024:  CONCLUSION:  1. Enlarged fibroid type uterus measuring 12.6 x 8.1 x 8.4 cm. Endometrium is obscured, and there is a probable dominant fibroid involving the upper and midportion of the uterus measuring 7.0 x 9.0 x 8.4 cm. This is not optimally visualized ultrasound, and follow-up MRI scanning may help to further assess. Neither ovary could be identified. No large adnexal mass or free pelvic fluid is seen.     HEALTH MAINTENANCE:    Immunizations:      Screening:        ASSESSMENT AND PLAN:  Uterine mass: Patient had MRI secondary to lower back pain which showed uterine mass measuring 9.2 x 8.3 x 8.6 cm, likely representing a fibroid. Follow-up US showed an enlarged fibroid uterus measuring 12.6 x 8.1 x 8.4 cm, with obscured endometrium, and a dominant fibroid measuring 7.0 x 9.0 x 8.4 cm. Given growth of fibroid over the last 8 years, patient was referred to gyn onc by ob gyn Dr. Dontae Monaco for further management. Patient reports she did do BRCA testing and was negative. Today, patient overall is doing well. On exam, uterus is very enlarged and patient has also not had any children. Therefore, reviewed for an ex-lap RAHDA-BSO and any other indicated procedure. Will plan to do via pfannenstiel incision given that patient is thin. I did explain and showed anatomic pictures of the procedure and explained that I may consider sending the uterus for an intra-op frozen section and if there is deep invasion or this is a high grade malignancy, then I would do a formal lymphadenectomy which may increase her risk of having lymphedema and possibly nerve damage. Reviewed risks and potential complications of surgery to include but not limited to: bleeding, infection, re-operation, bowel/bladder injury, DVT/PE, wound dehiscence, and colostomy. I reviewed the risks/benefits, and alternatives to the procedure.  Pt verbally consented to plan and all questions were answered to their satisfaction. Informed  patient that she may be placed on prophylactic anticoagulation post-operatively. Prior to surgery, patient will undergo appropriate pre-operative testing and medical clearance prior to purposed surgery. She was able to meet with our surgical coordinator and is tentatively scheduled to undergo this procedure with myself in the near future.  At the end of my visit both the patient and her family members expressed understanding and agreement with the above stated plan and all their questions were answered to their satisfaction.    Urinary frequency: Likely secondary to mass, but did check UA and positive for blood and leukocytes. Will send for culture.  Family history of cancer: Mother with ovarian, paternal aunt and cousins with breast, maternal grandfather with prostate. Patient reports she did do BRCA testing and was negative. Did  that she should consider invitae testing in the future. Will also recommend that she complete a colonoscopy.   CHF: Patient has a significant cardiac history (a. fib, stent placement, CHF) due to genetics. Will need cardiac clearance prior to surgery.

## 2024-04-12 NOTE — DISCHARGE INSTRUCTIONS
DISCHARGE INSTRUCTIONS  Total Abdominal Hysterectomy and Removal of Ovaries  Please call the office ([phone]) within 48 hours of returning home to schedule  or confirm a postoperative visit with your physician.    ORDERED MEDICATIONS    PAIN  It is common for women to experience mild to moderate pain after they are discharged from the hospital. We expect that this pain should lessen with each day after surgery. You will receive a prescription for pain medicine at the time of your discharge. It is important to use pain medicationsas you need them, in order for you to be comfortable, to promote healing and to increase your daily activities.    Most commonly prescribed medications include:  Oxycodone: You may take this medication every 2-3 hours as needed for pain. You may also take Tylenol for break through pain along with this medication.  Norco: You may take this medication every 4-6 hours as needed for pain. DO NOT combine this medication with Tylenol.  Tylenol Regular Strength: 325mg every 6 hours as needed for pain. As you begin to feel better you can stop taking your prescription pain medication and take the Tylenol alone.  DO NOT take medications such as Motrin, aspirin or ibuprofen if you are taking other blood thinning medications (such as: Lovenox, Coumadin or Plavix).  Motrin: If you are not taking a prescribed blood thinning medication, you may take Motrin 400mg- 600mg every 6 hours as needed for pain.  You should not drive while taking narcotic pain medications.  Taking a warm shower or bath often helps pain as well. Just be sure to pat dry or air dry your incision before dressing.    Refilling your prescription medications:  Please keep in mind that prescription medications cannot be refilled after office hours or on the weekends. Please give the office 24-48 hours’ notice if you need a refill. Certain pain medications cannot be called into your pharmacy, these prescriptions will need to be picked  up at the office.    Call your doctor if: You are experiencing worsening pain or pain that is not relieved by  medications.    BLOOD CLOTS:  You may be sent home on injectable medication blood thinners (Fragmin, Lovenox or Arixtra) to prevent blood clots from forming in your legs or pelvis after surgery.  You will be instructed on how to inject this medication into your thigh on a daily basis.  Never inject this medication anywhere near your abdominal incision.  Blood clots can move to different places in your body, such as your heart or lungs.  DO NOT take medications such as Motrin, Coumadin, aspirin or ibuprofen while taking your injectable blood thinning medications.  It is possible for patients that are considered “high risk” for developing blood clots to go home with up to a 25 day supply of injectable blood thinning medication.  Most importantly be active. Walk around during the daytime every 2 hours. This will help prevent blood clots from forming.    Call your doctor if: You are experiencing pain and unequal swelling in your claves, shortness of breath or chest pain.    NAUSEA:  Patients often experience nausea after surgery. This is often related to anesthesia and slow return of bowel function.    Commonly prescribed medications for nausea include:  Compazine: Take every 6 hours as needed for nausea.  Zofran: Take every 6 hours as needed for nausea.  Small meals frequent meals, slow sips of fluids and saltine crackers can also help to relieve nausea.    Call your doctor if: You are experiencing nausea and vomiting that is uncontrolled by medication.    BOWEL AND BLADDER FUNCTION:  Abdominal surgery can cause changes in bowel patterns. Pain medication can also cause constipation, although this is not a reason to stop taking your prescribed pain medication. It will be helpful to follow the tips below to avoid constipation after surgery.    Colace stool softener: You will be given a prescription for this at  the time of your discharge. It is also available at your pharmacy without a prescription. Colace will NOT help you move your bowel. It will only help to keep your stool soft. It is important to take a stool softener as long as you are on pain medications. You may stop this medication if you are having loose stools.  For the first few days after surgery avoid high fiber foods as they are hard to digest. Once you are passing gas regularly you may add high fiber foods to your diet. Fresh fruits and bran cereals can help avoid constipation.  Prune juice and apricot nectar are also helpful to avoid constipation.  Drink plenty of fluids (at least 6-8 glasses of water or other non-caffeinated fluids daily).  Staying active will also promote good bowel activity. Walk around the house or take short walks outside.  If you are still unable to move your bowels you can use one of the following:  Dulcolax or Senna: are available without a prescription and can help stimulate your bowels.  Miralax: Can also be used daily until your bowel patterns have returned to normal.  Some women experience an increase in bowel motility after surgery- diarrhea.  Stop Colace stool softener.  Metamucil/ Citrucel: 1-2 teaspoons can be taken daily to slow down diarrhea.  If you have used Metamucil/ Citrucel for 2 days and diarrhea persists- please call the office.     Some women will get a urinary tract infection after surgery: The best way to avoid this is to drink plenty of fluids. Symptoms of a urinary tract infection include:  Pain with urination  Fever  Blood in your urine  Call your doctor if you are experiencing any of these symptoms.    Call the doctor if:  You have diarrhea that is not relieved by Metamucil/ Citrucel  You have constipation that persists beyond 3 days after discharge from the hospital  You experience any rectal bleeding or blood in your urine.    ACTIVITY:  May not resume driving until you can walk with ease and are no  longer taking narcotic pain medication.  Gradually resume your daily activities. Take rest periods throughout the day.  Avoid sitting for long periods of time.  Avoid strenuous activities, heavy housework (vacuuming) and heavy lifting (greater that 5-10 pounds). A good rule of judgement is: if it weighs more than a gallon of milk or requires more than one hand to  an object, DO NOT LIFT IT for at least 4 weeks.  Climbing stairs is OK- just take them one at a time until you regain your strength.  It is important to stay active to reduce the risk of developing blood clots in your legs and lungs. We suggest that you take short walks (in the house or outside) at least every 1-2 hours during your waking hours. If you notice spotting or vaginal bleeding after activity, rest until it resolves.    Call the doctor if you experience any of the following symptoms:  Persistent fatigue prohibiting you from your daily activities.  Shortness of breath  Chest pain  Swelling in one leg/foot more than the other  Calf pain    BLEEDING AND VAGINAL DISCHARGE:  DO NOT PLACE ANYTHING IN THE VAGINA. This includes intercourse, douching, tampons, etc. Your doctor will let you know when you can resume normal vaginal activity.  Expect slight spotting for up to 2 weeks after surgery. This may be pink, red or brownish. There should be no offensive odor.    Call the doctor for:  Any heavy bleeding or bright red blood from the vagina. Soaking through more than one pad in one hour or if you are using more than 3 mini pads per day.  Temperature above 101.0° F (38.3° C) on two occasions four hours apart.  Any foul smelling discharge.      WOUND CARE:  You may shower. Use water only on your incision site and pat your incision dry. Avoid tub baths until you see your physician for your postoperative visit.  Call the office at [phone] to schedule an appointment to have your staples removed 10-14 days after your surgery.  Once the staples are  removed, steri-strips (small pieces of paper tape) will be placed along the incision. The steri-strips should remain for approximately 7-10 days. You can gradually peel them off as they begin to curl up at the ends.  Once the staples and steri-strips are removed you may use Vitamin E oil or pure cocoa butter over the incision to promote less scarring.  If you have any drains or bladder tubes you should keep the area clean and dry. Do this by showering and blow drying the area after. Do not use any topical ointments. You may want to place a small dry dressing around the tube. If you have a visiting nurse, they will help you with this. If you do not have a visiting nurse the hospital staff will teach you how to do this yourself.  If you have a horizontal incision and no staples then you have sutures that will dissolve on their own.      Call the doctor for:  Temperature above 101.0° F (38.3° C) on two occasions 4 hours apart.  Chills.  Redness, swelling or warmth around the incision site that is increasing.  Any drainage from the incision that soaks a gauze pad.      FOLLOW UP:  We plan to see you in the office within 10-14 days of your surgery to remove the staples along your incision and to discuss the pathology results from your surgery.  If you are not given an office appointment at the time of your discharge from the hospital,please call the office to schedule a post-operative appointment with your doctor  at [phone].      CALLING THE DOCTOR:  During office hours (M-F; 8am- 4:30pm) call: [phone] and ask to speak with a Nurse or Physician Assistant.  After office hours: call [phone] to reach the on-call physician. Please reserve evening and weekend phone calls for urgent matters only.  If you are receiving home healthcare, call your home healthcare nurse, who will assess your condition and call the doctor if necessary.  For emergencies call: [phone] or go immediately to the nearest Emergency Room.

## 2024-04-13 LAB
ANION GAP SERPL CALC-SCNC: 7 MMOL/L (ref 0–18)
BASOPHILS # BLD AUTO: 0.01 X10(3) UL (ref 0–0.2)
BASOPHILS NFR BLD AUTO: 0.1 %
BUN BLD-MCNC: 13 MG/DL (ref 9–23)
BUN/CREAT SERPL: 18.1 (ref 10–20)
CALCIUM BLD-MCNC: 8.6 MG/DL (ref 8.7–10.4)
CHLORIDE SERPL-SCNC: 108 MMOL/L (ref 98–112)
CO2 SERPL-SCNC: 25 MMOL/L (ref 21–32)
CREAT BLD-MCNC: 0.72 MG/DL
DEPRECATED RDW RBC AUTO: 43.8 FL (ref 35.1–46.3)
EGFRCR SERPLBLD CKD-EPI 2021: 94 ML/MIN/1.73M2 (ref 60–?)
EOSINOPHIL # BLD AUTO: 0 X10(3) UL (ref 0–0.7)
EOSINOPHIL NFR BLD AUTO: 0 %
ERYTHROCYTE [DISTWIDTH] IN BLOOD BY AUTOMATED COUNT: 12.6 % (ref 11–15)
GLUCOSE BLD-MCNC: 146 MG/DL (ref 70–99)
HCT VFR BLD AUTO: 34.3 %
HGB BLD-MCNC: 11.3 G/DL
IMM GRANULOCYTES # BLD AUTO: 0.04 X10(3) UL (ref 0–1)
IMM GRANULOCYTES NFR BLD: 0.4 %
LYMPHOCYTES # BLD AUTO: 0.99 X10(3) UL (ref 1–4)
LYMPHOCYTES NFR BLD AUTO: 9 %
MCH RBC QN AUTO: 30.9 PG (ref 26–34)
MCHC RBC AUTO-ENTMCNC: 32.9 G/DL (ref 31–37)
MCV RBC AUTO: 93.7 FL
MONOCYTES # BLD AUTO: 0.48 X10(3) UL (ref 0.1–1)
MONOCYTES NFR BLD AUTO: 4.3 %
NEUTROPHILS # BLD AUTO: 9.54 X10 (3) UL (ref 1.5–7.7)
NEUTROPHILS # BLD AUTO: 9.54 X10(3) UL (ref 1.5–7.7)
NEUTROPHILS NFR BLD AUTO: 86.2 %
OSMOLALITY SERPL CALC.SUM OF ELEC: 293 MOSM/KG (ref 275–295)
PLATELET # BLD AUTO: 174 10(3)UL (ref 150–450)
POTASSIUM SERPL-SCNC: 4.6 MMOL/L (ref 3.5–5.1)
RBC # BLD AUTO: 3.66 X10(6)UL
SODIUM SERPL-SCNC: 140 MMOL/L (ref 136–145)
WBC # BLD AUTO: 11.1 X10(3) UL (ref 4–11)

## 2024-04-13 PROCEDURE — 99233 SBSQ HOSP IP/OBS HIGH 50: CPT | Performed by: HOSPITALIST

## 2024-04-13 RX ORDER — TRAMADOL HYDROCHLORIDE 50 MG/1
50 TABLET ORAL EVERY 6 HOURS PRN
Status: DISCONTINUED | OUTPATIENT
Start: 2024-04-13 | End: 2024-04-14

## 2024-04-13 RX ORDER — DOCUSATE SODIUM 100 MG/1
100 CAPSULE, LIQUID FILLED ORAL 2 TIMES DAILY PRN
Status: DISCONTINUED | OUTPATIENT
Start: 2024-04-13 | End: 2024-04-14

## 2024-04-13 NOTE — PROGRESS NOTES
[facility]  part of [facility]    Gynecologic Oncology Post-Op Progress Note    Kirsitn Sanders Patient Status:  Inpatient    3/6/1960 MRN [id number]   Location [facility] 4W/SW/SE Attending Rinku Guerrier MD   Hosp Day # 1 PCP Eleazar Morales MD     Subjective:  Patient is a 64 year old female  patient overall doing very well, ambulating a small amount, tolerating her clear liquid diet although she is feeling bloated.  She denies any vaginal bleeding.  States that her pain control is overall fairly good.  She denies nausea or vomiting.    Objective/Physical Exam:  General: Alert, orientated x3.  Cooperative.  No apparent distress.  Vital Signs:  Blood pressure 101/58, pulse 60, temperature 97.6 °F (36.4 °C), temperature source Axillary, resp. rate 16, height 5' 3\" (1.6 m), weight 130 lb (59 kg), SpO2 96%.    Abdomen:  Soft, non-distended, non-tender, with no rebound or guarding.  No peritoneal signs. No ascites.  Liver is within normal limits.  Spleen is not palpable.    Incision: Clean dry and intact with no erythema, bleeding.  Extremities:  No lower extremity edema noted.  Without clubbing or cyanosis.    Skin: Normal texture and turgor.  Neurologic: Cranial nerves are grossly intact.  Motor strength and sensory examination is grossly normal.  No focal neurologic deficit.    Labs:  Recent Labs   Lab 24  0558   RBC 3.66*   HGB 11.3*   HCT 34.3*   MCV 93.7   MCH 30.9   MCHC 32.9   RDW 12.6   NEPRELIM 9.54*   WBC 11.1*   .0       Recent Labs   Lab 24  0558   *   BUN 13   CREATSERUM 0.72   CA 8.6*      K 4.6      CO2 25.0       No results found for: \"\", \"CEA\", \"\"    Assessment/Plan:  Patient Active Problem List   Diagnosis    Atrial fibrillation (HCC)    Dilated cardiomyopathy (HCC)    Hypercholesteremia    Otosclerosis of left ear    Loss of perception for temperature    Dysphagia    New onset of headaches after age  50    Neuropathic pain of right lower extremity    Pre-op testing    Essential hypertension    Fibroid tumor       Patient is a 64 year old female  post-op day 1 from an  xlap/tahbso    1. Labs patient's hemoglobin is appropriate at 11.3, her white count is also appropriate.  2. GI - tolerating clears, no nausea or vomiting, will advance diet as tolerated  3. DVT prophylaxis - SCDs, ambulation, lovenox,  4.  - Cr stable, urine output adequate, D/C carrasco  5. Wound - clean, dry and intact  6. Onc: Preliminarily benign uterine fibroids  7. Disposition: I did review all postop precautions with the patient and her family and answered all their questions to their satisfaction  8. AFIB: Patient recently had a cardiac stent placement in 2024.  She was on Eliquis and Plavix.  Will give her prophylactic Lovenox today and the patient can restart her Eliquis  in the evening approximately 48 hours after her surgery.  She can restart her Plavix on Tuesday night approximately 48 hours after the restart of her Eliquis.  I did discuss this with the patient in great detail.  I will discuss with her cardiologist and will notify the patient if there is any changes.      Disposition: Patient can potentially go home tomorrow morning from a surgical perspective if her labs and vitals are stable.  Patient will need to be discharged by the hospitalist and potentially her cardiologist.    MIROSLAVA RUEDA,   2024  9:08 AM

## 2024-04-13 NOTE — PLAN OF CARE
Kirstin is alert/oriented. Vitals stable. Incisions clean/dry/intact. Tolerating diet, advanced to general. Ambulating with set up help. Pain control with morphine prn. Lovenox for DVT prophylaxis, eliquis to restart tomorrow per surgery. Voiding adequately. Passing gas. Bed low, locked, all safety measures in place.    Problem: Patient Centered Care  Goal: Patient preferences are identified and integrated in the patient's plan of care  Description: Interventions:  - What would you like us to know as we care for you?   - Provide timely, complete, and accurate information to patient/family  - Incorporate patient and family knowledge, values, beliefs, and cultural backgrounds into the planning and delivery of care  - Encourage patient/family to participate in care and decision-making at the level they choose  - Honor patient and family perspectives and choices  Outcome: Progressing     Problem: Patient/Family Goals  Goal: Patient/Family Long Term Goal  Description: Patient's Long Term Goal:     Interventions:  -   - See additional Care Plan goals for specific interventions  Outcome: Progressing  Goal: Patient/Family Short Term Goal  Description: Patient's Short Term Goal:     Interventions:   -   - See additional Care Plan goals for specific interventions  Outcome: Progressing     Problem: PAIN - ADULT  Goal: Verbalizes/displays adequate comfort level or patient's stated pain goal  Description: INTERVENTIONS:  - Encourage pt to monitor pain and request assistance  - Assess pain using appropriate pain scale  - Administer analgesics based on type and severity of pain and evaluate response  - Implement non-pharmacological measures as appropriate and evaluate response  - Consider cultural and social influences on pain and pain management  - Manage/alleviate anxiety  - Utilize distraction and/or relaxation techniques  - Monitor for opioid side effects  - Notify MD/LIP if interventions unsuccessful or patient reports new  pain  - Anticipate increased pain with activity and pre-medicate as appropriate  Outcome: Progressing     Problem: RISK FOR INFECTION - ADULT  Goal: Absence of fever/infection during anticipated neutropenic period  Description: INTERVENTIONS  - Monitor WBC  - Administer growth factors as ordered  - Implement neutropenic guidelines  Outcome: Progressing     Problem: SAFETY ADULT - FALL  Goal: Free from fall injury  Description: INTERVENTIONS:  - Assess pt frequently for physical needs  - Identify cognitive and physical deficits and behaviors that affect risk of falls.  - [place] fall precautions as indicated by assessment.  - Educate pt/family on patient safety including physical limitations  - Instruct pt to call for assistance with activity based on assessment  - Modify environment to reduce risk of injury  - Provide assistive devices as appropriate  - Consider OT/PT consult to assist with strengthening/mobility  - Encourage toileting schedule  Outcome: Progressing     Problem: DISCHARGE PLANNING  Goal: Discharge to home or other facility with appropriate resources  Description: INTERVENTIONS:  - Identify barriers to discharge w/pt and caregiver  - Include patient/family/discharge partner in discharge planning  - Arrange for needed discharge resources and transportation as appropriate  - Identify discharge learning needs (meds, wound care, etc)  - Arrange for interpreters to assist at discharge as needed  - Consider post-discharge preferences of patient/family/discharge partner  - Complete POLST form as appropriate  - Assess patient's ability to be responsible for managing their own health  - Refer to Case Management Department for coordinating discharge planning if the patient needs post-hospital services based on physician/LIP order or complex needs related to functional status, cognitive ability or social support system  Outcome: Progressing

## 2024-04-13 NOTE — PLAN OF CARE
Patient is alert and oriented x4. On room air. Stand by assist with ambulation. IVF infusing continuously. Remote tele in place. Post op for total abdominal hysterectomy and bilateral salpingo-oophorectomy; Transverse incision on lower abdomen; Clean, dry, and intact. Abdominal binder in place. IV morphine given prn for pain twice. Norco po given once. No acute changes noted throughout shift. Call light within reach; bed alarm on. Calls appropriately.    Problem: Patient Centered Care  Goal: Patient preferences are identified and integrated in the patient's plan of care  Description: Interventions:  - Provide timely, complete, and accurate information to patient/family  - Incorporate patient and family knowledge, values, beliefs, and cultural backgrounds into the planning and delivery of care  - Encourage patient/family to participate in care and decision-making at the level they choose  - Honor patient and family perspectives and choices  Outcome: Progressing

## 2024-04-13 NOTE — CONSULTS
[facility]    PATIENT'S NAME: LUH ARMAS   ATTENDING PHYSICIAN: Phan Mcmahon DO   CONSULTING PHYSICIAN: Reina Gonzalez MD   PATIENT ACCOUNT#:   [id number]    LOCATION:  [facility] PACU 3 [facility] 10  MEDICAL RECORD #:   [id number]       YOB: 1960  ADMISSION DATE:       04/12/2024      CONSULT DATE:  04/12/2024    REPORT OF CONSULTATION      REASON FOR ADMISSION:  Post total abdominal hysterectomy and bilateral salpingo-oophorectomy.    HISTORY OF PRESENT ILLNESS:  Patient is a 64-year-old  female who had enlarging uterine mass representing fibroids.  Patient was referred to Gynecologic Oncology because of enlargement of her fibroids.  She has been experiencing lower abdominal pain, and decision was made for total abdominal hysterectomy.  Scheduled for above-mentioned procedure by her gynecologic oncologist by her Dr. Phan Mcmahon.  Postoperatively transferred to PACU for further monitoring.      PAST MEDICAL HISTORY:  Dilated cardiomyopathy, ejection fraction around 45%; coronary artery disease, status post LAD PCI stent; paroxysmal atrial fibrillation, anticoagulated with Eliquis; hypertension; and hyperlipidemia.      PAST SURGICAL HISTORY:  Right humerus open reduction and internal fixation, bilateral bunionectomies.    MEDICATIONS:  Please see medication reconciliation list.     ALLERGIES:  No known drug allergies.  She has side effects to lisinopril, penicillin, and hydrochlorothiazide.     FAMILY HISTORY:  Father had heart disease.  Mother had ovarian cancer.      REVIEW OF SYSTEMS:  Currently no chest pain, no shortness of breath.  Some abdominal discomfort.  Other 12-point review of systems is negative.       PHYSICAL EXAMINATION:    GENERAL:  Alert and oriented to time, place and person.  No acute distress.   VITAL SIGNS:  Temperature 97.5; pulse 104, atrial fibrillation on monitor; respiratory rate 18; blood pressure 126/78; pulse ox 98% on room  air.  HEENT:  Atraumatic.  Oropharynx clear.  Moist mucous membranes.  Ears and nose normal.  Eyes:  Anicteric sclerae.    NECK:  Supple.  No lymphadenopathy.  Trachea midline.  Full range of motion.   LUNGS:  Clear to auscultation bilaterally.  Normal respiratory effort.    HEART:  Irregularly irregular rhythm.  S1 and S2 auscultated.  No murmur.    ABDOMEN:  Soft, nondistended.  No tenderness.  Lower abdominal transverse incision.  Hypoactive bowel sounds.   EXTREMITIES:  No peripheral edema, clubbing or cyanosis.    NEUROLOGIC:  Motor and sensory intact.     ASSESSMENT AND PLAN:    1.   Fibroid uterus with enlarging fibroids, status post exploratory laparotomy with Pfannensteil incision, total abdominal hysterectomy and bilateral salpingo-oophorectomy.  Pain control.  Monitor hemodynamic status.  DVT prophylaxis.  Full pathology report still pending.   2.   Atrial fibrillation.  Continue home medications and monitor.  Hold Eliquis until okay to resume by Gynecologic Oncology.   3.   Coronary artery disease.  Hold Plavix.  Continue when okay with Gynecologic Oncology.   4.   Hypertension.  Continue home medications and monitor.  5.   Hyperlipidemia.  Continue home medications.     Dictated By Reina Gonzalez MD  d: 04/12/2024 18:33:54  t: 04/12/2024 19:02:01  Job [id number]  FB/

## 2024-04-13 NOTE — PROGRESS NOTES
[facility]  part of [facility]    Progress Note    Kirstin Sanders Patient Status:  Inpatient    3/6/1960 MRN [id number]   Location [facility] 4W/SW/SE Attending Rinku Guerrier MD   Hosp Day # 1 PCP Eleazar Morales MD       Subjective:   Kirstin Sanders is a(n) 64 year old female was seen and examined  Lying comfortably  NAD  Pain controlled  No complaints     Objective:   Blood pressure 105/66, pulse 86, temperature 99 °F (37.2 °C), temperature source Oral, resp. rate 16, height 5' 3\" (1.6 m), weight 130 lb (59 kg), SpO2 98%.    GENERAL:  Alert and oriented to time, place and person.  No acute distress.   HEENT:  Atraumatic.  Oropharynx clear.  Moist mucous membranes.  Ears and nose normal.  Eyes:  Anicteric sclerae.    NECK:  Supple.  No lymphadenopathy.  Trachea midline.  Full range of motion.   LUNGS:  Clear to auscultation bilaterally.  Normal respiratory effort.    HEART:  Irregularly irregular rhythm.  S1 and S2 auscultated.  No murmur.    ABDOMEN:  Soft, nondistended.  No tenderness.  Lower abdominal transverse incision.  Hypoactive bowel sounds.   EXTREMITIES:  No peripheral edema, clubbing or cyanosis.    NEUROLOGIC:  Motor and sensory intact.     Current Inpatient Medications:     Current Facility-Administered Medications:     docusate sodium (Colace) cap 100 mg, 100 mg, Oral, BID PRN    lactated ringers infusion, , Intravenous, Continuous    ondansetron (Zofran) 4 MG/2ML injection 4 mg, 4 mg, Intravenous, Q8H PRN **OR** ondansetron (Zofran) tab 4 mg, 4 mg, Oral, Q8H PRN    lactated ringers infusion, , Intravenous, Continuous    enoxaparin (Lovenox) 40 MG/0.4ML SUBQ injection 40 mg, 40 mg, Subcutaneous, Daily    HYDROcodone-acetaminophen (Norco) 5-325 MG per tab 1 tablet, 1 tablet, Oral, Q6H PRN    morphINE PF 2 MG/ML injection 1 mg, 1 mg, Intravenous, Q2H PRN **OR** morphINE PF 2 MG/ML injection 2 mg, 2 mg, Intravenous, Q2H PRN **OR** morphINE PF 4 MG/ML  injection 4 mg, 4 mg, Intravenous, Q2H PRN    Assessment and Plan:   1.       Fibroid uterus with enlarging fibroids, status post exploratory laparotomy with Pfannensteil incision, total abdominal hysterectomy and bilateral salpingo-oophorectomy.  Pain control adequate.  Monitor hemodynamic status.  DVT prophylaxis.  Full pathology report still pending.   2.       Atrial fibrillation.  Continue home medications and monitor. Will start eliquis tomorrow  3.       Coronary artery disease.  Hold Plavix, will restart on Tuesday  4.       Hypertension.  Continue home medications and monitor.  5.       Hyperlipidemia.  Continue home medications.     MDM: High     Results:     Recent Labs   Lab 04/13/24  0558   RBC 3.66*   HGB 11.3*   HCT 34.3*   MCV 93.7   MCH 30.9   MCHC 32.9   RDW 12.6   NEPRELIM 9.54*   WBC 11.1*   .0         Recent Labs   Lab 04/13/24  0558   *   BUN 13   CREATSERUM 0.72   EGFRCR 94   CA 8.6*      K 4.6      CO2 25.0         Imaging:   No results found.          Rinku Guerrier MD  4/13/2024

## 2024-04-14 VITALS
HEIGHT: 63 IN | WEIGHT: 130 LBS | HEART RATE: 70 BPM | BODY MASS INDEX: 23.04 KG/M2 | DIASTOLIC BLOOD PRESSURE: 83 MMHG | OXYGEN SATURATION: 97 % | TEMPERATURE: 98 F | RESPIRATION RATE: 18 BRPM | SYSTOLIC BLOOD PRESSURE: 111 MMHG

## 2024-04-14 LAB
ANION GAP SERPL CALC-SCNC: 6 MMOL/L (ref 0–18)
BASOPHILS # BLD AUTO: 0.02 X10(3) UL (ref 0–0.2)
BASOPHILS NFR BLD AUTO: 0.2 %
BUN BLD-MCNC: 16 MG/DL (ref 9–23)
BUN/CREAT SERPL: 21.9 (ref 10–20)
CALCIUM BLD-MCNC: 9.1 MG/DL (ref 8.7–10.4)
CHLORIDE SERPL-SCNC: 106 MMOL/L (ref 98–112)
CO2 SERPL-SCNC: 28 MMOL/L (ref 21–32)
CREAT BLD-MCNC: 0.73 MG/DL
DEPRECATED RDW RBC AUTO: 44.9 FL (ref 35.1–46.3)
EGFRCR SERPLBLD CKD-EPI 2021: 93 ML/MIN/1.73M2 (ref 60–?)
EOSINOPHIL # BLD AUTO: 0.08 X10(3) UL (ref 0–0.7)
EOSINOPHIL NFR BLD AUTO: 1 %
ERYTHROCYTE [DISTWIDTH] IN BLOOD BY AUTOMATED COUNT: 12.9 % (ref 11–15)
GLUCOSE BLD-MCNC: 93 MG/DL (ref 70–99)
HCT VFR BLD AUTO: 33.7 %
HGB BLD-MCNC: 11 G/DL
IMM GRANULOCYTES # BLD AUTO: 0.02 X10(3) UL (ref 0–1)
IMM GRANULOCYTES NFR BLD: 0.2 %
LYMPHOCYTES # BLD AUTO: 3.27 X10(3) UL (ref 1–4)
LYMPHOCYTES NFR BLD AUTO: 39.3 %
MAGNESIUM SERPL-MCNC: 1.6 MG/DL (ref 1.6–2.6)
MCH RBC QN AUTO: 30.7 PG (ref 26–34)
MCHC RBC AUTO-ENTMCNC: 32.6 G/DL (ref 31–37)
MCV RBC AUTO: 94.1 FL
MONOCYTES # BLD AUTO: 0.52 X10(3) UL (ref 0.1–1)
MONOCYTES NFR BLD AUTO: 6.3 %
NEUTROPHILS # BLD AUTO: 4.41 X10 (3) UL (ref 1.5–7.7)
NEUTROPHILS # BLD AUTO: 4.41 X10(3) UL (ref 1.5–7.7)
NEUTROPHILS NFR BLD AUTO: 53 %
OSMOLALITY SERPL CALC.SUM OF ELEC: 291 MOSM/KG (ref 275–295)
PHOSPHATE SERPL-MCNC: 3.4 MG/DL (ref 2.4–5.1)
PLATELET # BLD AUTO: 176 10(3)UL (ref 150–450)
POTASSIUM SERPL-SCNC: 3.8 MMOL/L (ref 3.5–5.1)
RBC # BLD AUTO: 3.58 X10(6)UL
SODIUM SERPL-SCNC: 140 MMOL/L (ref 136–145)
WBC # BLD AUTO: 8.3 X10(3) UL (ref 4–11)

## 2024-04-14 PROCEDURE — 99239 HOSP IP/OBS DSCHRG MGMT >30: CPT | Performed by: HOSPITALIST

## 2024-04-14 RX ORDER — HYDROCODONE BITARTRATE AND ACETAMINOPHEN 5; 325 MG/1; MG/1
1 TABLET ORAL EVERY 6 HOURS PRN
Qty: 20 TABLET | Refills: 0 | Status: SHIPPED | OUTPATIENT
Start: 2024-04-14

## 2024-04-14 RX ORDER — MAGNESIUM OXIDE 400 MG/1
400 TABLET ORAL ONCE
Status: COMPLETED | OUTPATIENT
Start: 2024-04-14 | End: 2024-04-14

## 2024-04-14 RX ORDER — POTASSIUM CHLORIDE 1.5 G/1.58G
40 POWDER, FOR SOLUTION ORAL ONCE
Status: COMPLETED | OUTPATIENT
Start: 2024-04-14 | End: 2024-04-14

## 2024-04-14 RX ORDER — CLOPIDOGREL BISULFATE 75 MG/1
75 TABLET ORAL DAILY
Qty: 90 TABLET | Refills: 3 | Status: SHIPPED | OUTPATIENT
Start: 2024-04-16

## 2024-04-14 NOTE — HISTORICAL OFFICE NOTE
Facility Logo [facility]  [address], [address] [place] [address]  [phone]      Kirstin Sanders  Consult  Demographics:  Name: Kirstin Sanders YOB: 1960  Age: 63, Female Medical Record No: [id number]  Visited Date/Time: 02/23/2024 08:40 AM    Chief Complaints  Consultation   Pt states she is here for clearance for a colonoscopy also on 3/14/24 pt will also have a full hysterectomy.  Pt would like to know if she can stop Plavix for 5 days.  History of Present Illness  Patient is here for an urgent appointment, she is status post stent to the proximal LAD in January of this year.  She has been doing well without any symptoms.  Unfortunately she was diagnosed with uterine mass and now requires hysterectomy.  This is a concern for cancer.  She is also getting a colonoscopy before that to rule out any invasion of the disease.  GI does not want the Plavix to be stopped.  Hysterectomy is for now scheduled on March 14.  Cardiac risk factors Never smoked  Past Medical History  1.CAD (coronary artery disease)  2.Chronic systolic CHF (congestive heart failure), NYHA class 1  3.Cardiomyopathy, other - CMO  4.Mitral valve regurgitation  5.Atrial fibrillation, chronic  6.Hypertension (HTN), primary  7.Hyperlipidemia, mixed  8.Osteopenia  9.Abnormal uterine bleeding (AUB)  10.Otosclerosis of left ear  11.Long term current use of anticoagulant  Past Surgical History  1.Status post insertion of drug-eluting stent into left anterior descending (LAD) artery  Family History  1. Father - Family history of heart disease - FHx  2. Mother - No history of Family history of heart disease - FHx  Social History  Smoking status Never smoked  Tobacco usage - No (Non-smoker for personal reasons (finding))  Review of systems  Cardiovascular No history of Chest pain, CLOUD, Palpitations, Syncope, PND, Orthopnea, Edema and Claudication  Respiratory No history of SOB, Cough, Hemoptysis, Wheezing,  Pleurisy and Sputum  Hem/Lymphatic No history of Easy bruising, Bleeding diathesis, Blood clots, Swollen glands, Lymphedema, Hx of blood transfusion, Anemia and Bleeding problems  Physical Examination  Constitutional 99% O2  Vitals Left Arm Sitting  / 60 mmHg, Pulse rate 63 bpm, Regular, Height in 5' 3\", BMI: 23.2, Weight in 131 lbs (or) 59 kgs and BSA : 1.63 cc/m²  Head/Eyes/Ears/Nose/Mouth/Throat EOMI and PERRLA  Neck No carotid bruits and No JVD  Respiratory Unlabored, Lungs clear with normal breath sounds and Equal bilaterally  Cardiovascular Regular rhythm. Normal and normal S1 and S2    Gastrointestinal Abdomen soft and Non-tender  Gait Normal gait  Upper Extremities No clubbing, No cyanosis and No edema  Lower Extremities Pulses 2+ and equal bilaterally  Skin Warm and dry  Allergies  1.Lisinopril(Reaction:, Severity:Mild)  2.losartan - Drug Class(Reaction:brain fog, dry mouth, leg cramps, Severity:Moderate)  3.Penicillins [Snomed:[id number]](Reaction:Dyspnea [Snomed:[id number]], Severity:Severe)  Medications  1.ELIQUIS 5MG TABLETS, TAKE 1 TABLET BY MOUTH TWICE DAILY  2.Lipitor 40 mg tablet, Take 1 tablet orally once a day.  3.losartan 50 mg tablet, Take 1 tablet orally once a day.  4.metoprolol succinate ER 25 mg tablet,extended release 24 hr, Take 1 tablet orally once a day.  5.Plavix 75 mg tablet, Take 1 tablet orally once a day.  Impression  1.Preoperative cardiovascular examination  2.CAD (coronary artery disease)  3.Status post insertion of drug-eluting stent into left anterior descending (LAD) artery  4.Chronic systolic CHF (congestive heart failure), NYHA class 1  5.Cardiomyopathy, other - CMO  6.Mitral valve regurgitation  7.Atrial fibrillation, chronic  8.Hypertension (HTN), primary  9.Hyperlipidemia, mixed  10.Long term current use of anticoagulant  Assessment & Plan  Continue current medication  Do not stop Plavix for colonoscopy  Okay to hold Plavix for 5 days prior to the hysterectomy due to  risk of cancer.  Risk of stent thrombosis discussed with the patient at length.  Restart Plavix as soon as possible after the hysterectomy  Okay to proceed with hysterectomy surgery without further testing  Follow-up with Dr. Borrero as scheduled  Follow-up with me as needed  Future appointments  1.Referral Visit - Eleazar Diogobettie ([email or web address]) : (Today)  Nurses documentation  Refills: none  EKG; none  Patient instructions  Continue current medication  Do not stop Plavix for colonoscopy  Okay to hold Plavix for 5 days prior to the hysterectomy due to risk of cancer.  Risk of stent thrombosis discussed with the patient at length.  Restart Plavix as soon as possible after the hysterectomy  Okay to proceed with hysterectomy surgery without further testing  Follow-up with Dr. Borrero as scheduled  Follow-up with me as needed    Lab Details  COMP METABOLIC PANEL (14)  01/16/2024 11:06:16 AM  GLUCOSE 99 70-99 mg/dL  F  SODIUM 138 136-145 mmol/L  F  POTASSIUM 4.0 3.5-5.1 mmol/L  F  CHLORIDE 104  mmol/L  F  CO2 28.0 21.0-32.0 mmol/L  F  ANION GAP 6 0-18 mmol/L  F  BUN 15 9-23 mg/dL  F  CREATININE 0.80 0.55-1.02 mg/dL  F  BUN/ CREAT RATIO 18.8 10.0-20.0  F  CALCIUM 9.5 8.7-10.4 mg/dL  F  OSMOLALITY CALCULATED 287 275-295 mOsm/kg  F  E GFR CR 84 >=60 mL/min/1.73m2  F  ALT 15 10-49 U/L  F  AST 22 <=34 U/L  F  ALKALINE PHOSPHATASE 82  U/L  F  BILIRUBIN, TOTAL 1.3 0.2-1.1 mg/dL H F  TOTAL PROTEIN 7.6 5.7-8.2 g/dL  F  ALBUMIN 4.5 3.2-4.8 g/dL  F  GLOBULIN 3.1 2.8-4.4 g/dL  F  A/G RATIO 1.5 1.0-2.0  F  FASTING PATIENT CMP ANSWER Yes   F  CBC W/ DIFFERENTIAL  01/16/2024 10:29:53 AM  WBC 7.4 4.0-11.0 x10(3) uL  F  RED BLOOD COUNT 5.08 3.80-5.30 x10(6)uL  F  HGB 15.1 12.0-16.0 g/dL  F  HCT 47.9 35.0-48.0 %  F  MEAN CELL VOLUME 94.3 80.0-100.0 fL  F  MEAN CORPUSCULAR HEMOGLOBIN 29.7 26.0-34.0 pg  F  MEAN CORPUSCULAR HGB CONC 31.5 31.0-37.0 g/dL  F  RED CELL DISTRIBUTION WIDTH-SD 44.4 35.1-46.3 fL  F  RED CELL  DISTRIBUTION WIDTH CV 12.8 11.0-15.0 %  F  PLATELETS 233.0 150.0-450.0 10(3)uL  F  NEUTROPHIL ABS PRELIM 3.70 1.50-7.70 x10 (3) uL  F  NEUTROPHIL ABSOLUTE 3.70 1.50-7.70 x10(3) uL  F  LYMPHOCYTE ABSOLUTE 3.02 1.00-4.00 x10(3) uL  F  MONOCYTE ABSOLUTE 0.45 0.10-1.00 x10(3) uL  F  EOSINOPHIL ABSOLUTE 0.16 0.00-0.70 x10(3) uL  F  BASOPHIL ABSOLUTE 0.02 0.00-0.20 x10(3) uL  F  IMMATURE GRANULOCYTE COUNT 0.01 0.00-1.00 x10(3) uL  F  NEUTROPHIL % 50.3 %  F  LYMPHOCYTE % 41.0 %  F  MONOCYTE % 6.1 %  F  EOSINOPHIL % 2.2 %  F  BASOPHIL % 0.3 %  F  IMMATURE GRANULOCYTE RATIO % 0.1 %  F  Diagnostics Details  Exercise Myocardial Perfusion Imaging 12/29/2023  1.Stress EKG is normal.    2.Maximal exercise treadmill test (MPHR : 96 %).    3.The functional capacity is good (9.8 METs).    4.Pt denied chest pain.    5.Occasional PVC's.    1.This is an abnormal perfusion study.    2.This scan is suggestive of low to moderate risk for future cardiovascular events.    3.The left ventricular cavity is noted to be normal on the stress study. The left ventricular ejection fraction was calculated to be 41% and left ventricular global function is mildly reduced.    4.The study quality is average.    5.Small size moderate intensity reversible antioapical defect.    Trans Thoracic Echocardiogram 11/29/2023  1.The left ventricle is normal in size. Mild left ventricular hypertrophy. Global left ventricular systolic function is mildly decreased. The left ventricular ejection fraction is 40-45%. Left ventricular diastolic function is indeterminate.    2.Mild aortic regurgitation.    3.Moderate mitral regurgitation.    4.Moderate tricuspid regurgitation.    5.The left atrial diameter is severely increased.    6.The right atrium is moderately enlarged.    7.The estimated pulmonary artery systolic pressure is 46 mmHg assuming a right atrial pressure of 8 mmHg. Evidence of pulmonary hypertension is noted.    8.The study quality is good.    Care  Providers: Julian Macdonald MD, Chasity Winter RN and Monse TURPIN  Electronically Authenticated by  Julian Macdonald MD  02/23/2024 11:55:48 AM  Disclaimer: Components of this note were documented using voice recognition system and are subject to errors not corrected at proofreading. Contact the author of this note for any clarifications.

## 2024-04-14 NOTE — PLAN OF CARE
No acute changes over night. Pt is alert and oriented on RA. Remote tele in place. Pt is voiding freely. Pt tolerating general diet. Pain controlled with oral Norco. Pt is up independently. Call light is within reach and safety measures are in place.    Problem: PAIN - ADULT  Goal: Verbalizes/displays adequate comfort level or patient's stated pain goal  Description: INTERVENTIONS:  - Encourage pt to monitor pain and request assistance  - Assess pain using appropriate pain scale  - Administer analgesics based on type and severity of pain and evaluate response  - Implement non-pharmacological measures as appropriate and evaluate response  - Consider cultural and social influences on pain and pain management  - Manage/alleviate anxiety  - Utilize distraction and/or relaxation techniques  - Monitor for opioid side effects  - Notify MD/LIP if interventions unsuccessful or patient reports new pain  - Anticipate increased pain with activity and pre-medicate as appropriate  Outcome: Progressing     Problem: RISK FOR INFECTION - ADULT  Goal: Absence of fever/infection during anticipated neutropenic period  Description: INTERVENTIONS  - Monitor WBC  - Administer growth factors as ordered  - Implement neutropenic guidelines  Outcome: Progressing     Problem: SAFETY ADULT - FALL  Goal: Free from fall injury  Description: INTERVENTIONS:  - Assess pt frequently for physical needs  - Identify cognitive and physical deficits and behaviors that affect risk of falls.  - Ariel fall precautions as indicated by assessment.  - Educate pt/family on patient safety including physical limitations  - Instruct pt to call for assistance with activity based on assessment  - Modify environment to reduce risk of injury  - Provide assistive devices as appropriate  - Consider OT/PT consult to assist with strengthening/mobility  - Encourage toileting schedule  Outcome: Progressing     Problem: DISCHARGE PLANNING  Goal: Discharge to home or other  facility with appropriate resources  Description: INTERVENTIONS:  - Identify barriers to discharge w/pt and caregiver  - Include patient/family/discharge partner in discharge planning  - Arrange for needed discharge resources and transportation as appropriate  - Identify discharge learning needs (meds, wound care, etc)  - Arrange for interpreters to assist at discharge as needed  - Consider post-discharge preferences of patient/family/discharge partner  - Complete POLST form as appropriate  - Assess patient's ability to be responsible for managing their own health  - Refer to Case Management Department for coordinating discharge planning if the patient needs post-hospital services based on physician/LIP order or complex needs related to functional status, cognitive ability or social support system  Outcome: Progressing

## 2024-04-14 NOTE — CONSULTS
[facility]  Cardiology Consultation    Kirstin Sanders Patient Status:  Inpatient    3/6/1960 MRN [id number]   Location [facility] 4W/SW/SE Attending Rinku Guerrier MD   Hosp Day # 2 PCP Eleazar Morales MD     Date of Admission:  2024  Date of Consult:  2024  Reason for Consultation:   Atrial fibrillation with pauses    History of Present Illness:   Patient is a 64-year-old female with a history of CAD s/p PCI proximal LAD on 2024, HFrEF with EF 40-45%, and permanent atrial fibrillation who presented for elective total abdominal hysterectomy and bilateral salpingo-oophorectomy for uterine fibroids.  Since the surgery has a hoarse voice, reports some difficulty swallowing liquids in particular even prior to the surgery which results in some shortness of breath and choking sensation.  This morning when eating breakfast was choking on her eggs and noted to have a pause of 4.5 seconds.  Frequent pauses less than 3 seconds while in atrial fibrillation.  She denies any associated symptoms related to this.  Also having significant persistent pain, taking pain medicines around-the-clock.    Cardiac history:  CAD s/p PCI proximal LAD on 2024  HFrEF, EF 40-45%  Atrial fibrillation  HTN  HLD    Past Medical History  Past Medical History:    A-fib (HCC)    Cardiomyopathy (HCC)    EF 45%    Heart failure (HCC)    High blood pressure    High cholesterol    Shoulder fracture, left    Surgical repair; ok since     Visual impairment    contacts     Past Surgical History  Past Surgical History:   Procedure Laterality Date    Bunionectomy Bilateral     Cath bare metal stent (bms)      Colonoscopy N/A 2024    with polypectomy, clip x 3    Colonoscopy N/A 2024    Procedure: COLONOSCOPY with polypectomy, clip x 3;  Surgeon: Aashish Jeff MD;  Location: [facility] ENDOSCOPY    Other surgical history Right     shoulder and humerous fractures repaired    Other  surgical history Bilateral     ear pinning    Other surgical history Left     Ear stapectomy,failed    Pt w/ coronary artery stent  01/18/2024     Family History  Family History   Problem Relation Age of Onset    Heart Disorder Father         Heart valve disease    Cancer Mother 61        ovarian    Ovarian Cancer Mother 61    Breast Cancer Paternal Aunt         50s     Social History  Pediatric History   Patient Parents    Not on file     Other Topics Concern    Caffeine Concern No    Exercise No    Seat Belt Not Asked    Special Diet Not Asked    Stress Concern Not Asked    Weight Concern Not Asked    Left Handed Not Asked    Right Handed Yes    Currently spends a great deal of time in the sun Not Asked    Past Sunlamp Treatments for Acne Not Asked    History of tanning Not Asked    Hx of Spending Great Deal of Time in Sun Not Asked    Bad sunburns in the past Not Asked    Tanning Salons in the Past Not Asked    Hx of Radiation Treatments Not Asked    Regular use of sun block Not Asked   Social History Narrative    The patient does not use an assistive device..      The patient does live in a home with stairs.         Current Medications:  Current Facility-Administered Medications   Medication Dose Route Frequency    magnesium oxide (Mag-Ox) tab 400 mg  400 mg Oral Once    potassium chloride (Klor-Con) 20 MEQ oral powder 40 mEq  40 mEq Oral Once    docusate sodium (Colace) cap 100 mg  100 mg Oral BID PRN    traMADol (Ultram) tab 50 mg  50 mg Oral Q6H PRN    lactated ringers infusion   Intravenous Continuous    ondansetron (Zofran) 4 MG/2ML injection 4 mg  4 mg Intravenous Q8H PRN    Or    ondansetron (Zofran) tab 4 mg  4 mg Oral Q8H PRN    lactated ringers infusion   Intravenous Continuous    enoxaparin (Lovenox) 40 MG/0.4ML SUBQ injection 40 mg  40 mg Subcutaneous Daily    HYDROcodone-acetaminophen (Norco) 5-325 MG per tab 1 tablet  1 tablet Oral Q6H PRN    morphINE PF 2 MG/ML injection 1 mg  1 mg Intravenous Q2H  PRN    Or    morphINE PF 2 MG/ML injection 2 mg  2 mg Intravenous Q2H PRN    Or    morphINE PF 4 MG/ML injection 4 mg  4 mg Intravenous Q2H PRN     Medications Prior to Admission   Medication Sig    clopidogrel 75 MG Oral Tab Take 1 tablet (75 mg total) by mouth daily.    losartan 50 MG Oral Tab Take 1 tablet (50 mg total) by mouth nightly.    ELIQUIS 5 MG Oral Tab TK 1 T PO  BID    Metoprolol Succinate ER 50 MG Oral Tablet 24 Hr Take 0.5 tablets (25 mg total) by mouth nightly.    atorvastatin 40 MG Oral Tab Take 1 tablet (40 mg total) by mouth nightly.     Allergies  Allergies   Allergen Reactions    Hctz [Hydrochlorothiazide] SHORTNESS OF BREATH    Lisinopril SHORTNESS OF BREATH    Pcn [Penicillins] UNKNOWN and SHORTNESS OF BREATH     DYSPNEA  Since infancy  Couldn't breathe. Patient is unsure if any skin blistering or organ involvement       Review of Systems:     14 point review of systems completed and negative except as noted.    Physical Exam:   Patient Vitals for the past 24 hrs:   BP Temp Temp src Pulse Resp SpO2   04/14/24 0848 111/83 97.6 °F (36.4 °C) Oral 70 18 97 %   04/13/24 1954 119/80 98.7 °F (37.1 °C) Oral 84 18 98 %   04/13/24 1753 109/61 98.6 °F (37 °C) Oral 81 16 98 %     Intake/Output:   Last 3 shifts:   Intake/Output                   04/12/24 0700 - 04/13/24 0659 04/13/24 0700 - 04/14/24 0659 04/14/24 0700 - 04/15/24 0659       Intake    P.O.  200  180  240    P.O. 200 180 240    I.V.  3296.7  --  --    I.V. 400 -- --    Volume (mL) (sodium chloride 0.9% infusion) 1000 -- --    Volume (mL) (lactated ringers infusion) 896.7 -- --    Volume (mL) (lactated ringers infusion) 1000 -- --    Total Intake 3496.7 180 240       Output    Urine  825  1200  700    Urine 825 1200 700    Urine Occurrence 5 x 2 x --    Total Output 825 1200 700       Net I/O     2671.7 -1020 -460          Scheduled Meds:    magnesium oxide  400 mg Oral Once    potassium chloride  40 mEq Oral Once    enoxaparin  40 mg  Subcutaneous Daily     Continuous Infusions:    lactated ringers 20 mL/hr at 04/12/24 1528    lactated ringers 100 mL/hr at 04/13/24 0328     General: Alert and oriented x 3. No apparent distress.   HEENT: Normocephalic, anicteric sclera, neck supple, no thyromegaly or adenopathy.  Neck: No JVD, carotids 2+, no bruits.  Cardiac: Irregularly irregular rhythm, normal rate.  S1, S2 normal. No murmur, pericardial rub, S3, or extra cardiac sounds.  Lungs: Clear without wheezes, rales, rhonchi or dullness.  Normal excursions and effort.  Abdomen: Soft, non-tender. No organosplenomegally, mass or rebound, BS-present.  Extremities: Without clubbing or cyanosis. No edema.    Neurologic: Alert and oriented, normal affect. No focal defects  Skin: Warm and dry.     Results:   Laboratory Data:  Lab Results   Component Value Date    WBC 8.3 04/14/2024    HGB 11.0 (L) 04/14/2024    HCT 33.7 (L) 04/14/2024    .0 04/14/2024    CREATSERUM 0.73 04/14/2024    BUN 16 04/14/2024     04/14/2024    K 3.8 04/14/2024     04/14/2024    CO2 28.0 04/14/2024    GLU 93 04/14/2024    CA 9.1 04/14/2024    ALB 4.5 03/01/2024    ALKPHO 108 03/01/2024    TP 7.8 03/01/2024    AST 25 03/01/2024    ALT 17 03/01/2024    TSH 2.430 07/13/2023    ESRML 7 12/22/2023    CRP <0.40 12/22/2023    MG 1.6 04/14/2024    PHOS 3.4 04/14/2024    CK 82 02/22/2022    B12 >2,000 (H) 07/13/2023         Recent Labs   Lab 04/13/24  0558 04/14/24  0918   * 93   BUN 13 16   CREATSERUM 0.72 0.73   CA 8.6* 9.1    140   K 4.6 3.8    106   CO2 25.0 28.0     Recent Labs   Lab 04/13/24  0558 04/14/24  0918   RBC 3.66* 3.58*   HGB 11.3* 11.0*   HCT 34.3* 33.7*   MCV 93.7 94.1   MCH 30.9 30.7   MCHC 32.9 32.6   RDW 12.6 12.9   NEPRELIM 9.54* 4.41   WBC 11.1* 8.3   .0 176.0       No results for input(s): \"BNPML\" in the last 168 hours.    No results for input(s): \"TROP\", \"CK\" in the last 168 hours.    Imaging:  No results  found.    Impression:   Permanent atrial fibrillation  - Reviewed telemetry notes persistent atrial fibrillation, occasional elevated rates with activity, and pauses a couple of which are just over 4 seconds long  - The longest pause occurred this morning while patient was eating and having difficulty/choking on her eggs, also ongoing severe pain contributing  - Given absence of any associated cardiac symptoms we will continue monitor for now  - Patient can resume oral metoprolol succinate 25 mg daily on discharge given her low EF    Uterine fibroids s/p RADHA/BSO  - Management per gynecology    CAD s/p proximal PCI 1/2024  - No chest pain or shortness of breath  - Resume Plavix when able per gynecology  - Restart atorvastatin 40 mg daily    HFrEF, EF 40-45%  - Clinically euvolemic, no shortness of breath  - Patient may resume metoprolol succinate 25 mg daily on discharge  - Additionally takes losartan 50 mg daily at home    Thank you for allowing me to participate in the care of your patient.  Patient can be discharged home today from cardiac standpoint, will need follow-up with Dr. Borrero as outpatient.    Giorgi Kate MD  [facility]  4/14/2024

## 2024-04-14 NOTE — DISCHARGE SUMMARY
[facility]  part of [facility]    Discharge Summary    Kirstin Sanders Patient Status:  Inpatient    3/6/1960 MRN [id number]   Location [facility] 4W/SW/SE Attending Rinku Guerrier MD   Hosp Day # 2 PCP Eleazar Morales MD     Date of Admission: 2024 Disposition: Home or Self Care     Date of Discharge: 24    Admitting Diagnosis: Uterine mass  Pre-op testing    Hospital Discharge Diagnoses: uterine mass s/p RADHA, BSO    Lace+ Score: 45  59-90 High Risk  29-58 Medium Risk  0-28   Low Risk.    TCM Follow-Up Recommendation:  LACE 29-58: Moderate Risk of readmission after discharge from the hospital.    Problem List:   Patient Active Problem List   Diagnosis    Atrial fibrillation (HCC)    Dilated cardiomyopathy (HCC)    Hypercholesteremia    Otosclerosis of left ear    Loss of perception for temperature    Dysphagia    New onset of headaches after age 50    Neuropathic pain of right lower extremity    Pre-op testing    Essential hypertension    Fibroid tumor       Reason for Admission: elective gyne surgery    Physical Exam:   Vitals:    24 0848   BP: 111/83   Pulse: 70   Resp: 18   Temp: 97.6 °F (36.4 °C)   GENERAL:  Alert and oriented to time, place and person.  No acute distress.   HEENT:  Atraumatic.  Oropharynx clear.  Moist mucous membranes.  Ears and nose normal.  Eyes:  Anicteric sclerae.    NECK:  Supple.  No lymphadenopathy.  Trachea midline.  Full range of motion.   LUNGS:  Clear to auscultation bilaterally.  Normal respiratory effort.    HEART:  Irregularly irregular rhythm.  S1 and S2 auscultated.  No murmur.    ABDOMEN:  Soft, nondistended.  No tenderness.  Lower abdominal transverse incision.  Hypoactive bowel sounds.   EXTREMITIES:  No peripheral edema, clubbing or cyanosis.    NEUROLOGIC:  Motor and sensory intact.       History of Present Illness:   Per Dr. Gonzalez  Patient is a 64-year-old  female who had enlarging uterine mass  representing fibroids. Patient was referred to Gynecologic Oncology because of enlargement of her fibroids. She has been experiencing lower abdominal pain, and decision was made for total abdominal hysterectomy. Scheduled for above-mentioned procedure by her gynecologic oncologist by her Dr. Phan Mcmahon. Postoperatively transferred to PACU for further monitoring.     Hospital Course:   1.       Fibroid uterus with enlarging fibroids, status post exploratory laparotomy with Pfannensteil incision, total abdominal hysterectomy and bilateral salpingo-oophorectomy.  Pain control adequate.    Path pending  Stable for dc    2.       Atrial fibrillation.  Continue home medications and monitor. Will start eliquis 4/14/24    3.       Coronary artery disease.  Hold Plavix, will restart on 4/16/24    4.       Hypertension.  Continue home medications and monitor.    5.       Hyperlipidemia.  Continue home medications.     Consultations: Cardiology, Gyne surgery    Procedures: see above    Complications: none    Discharge Condition: stable    Discharge Medications:      Discharge Medications        START taking these medications        Instructions Prescription details   docusate sodium 100 MG Caps  Commonly known as: Colace      Take 1 capsule (100 mg total) by mouth 2 (two) times daily as needed for constipation.   Quantity: 60 capsule  Refills: 0     HYDROcodone-acetaminophen 5-325 MG Tabs  Commonly known as: Norco      Take 1 tablet by mouth every 6 (six) hours as needed for Pain.   Quantity: 20 tablet  Refills: 0     ibuprofen 600 MG Tabs  Commonly known as: Motrin      Take 1 tablet (600 mg total) by mouth every 8 (eight) hours as needed for Pain.   Quantity: 60 tablet  Refills: 0            CONTINUE taking these medications        Instructions Prescription details   atorvastatin 40 MG Tabs  Commonly known as: Lipitor      Take 1 tablet (40 mg total) by mouth nightly.   Quantity: 90 tablet  Refills: 3     clopidogrel 75  MG Tabs  Commonly known as: Plavix  Start taking on: April 16, 2024      Take 1 tablet (75 mg total) by mouth daily.   Quantity: 90 tablet  Refills: 3     Eliquis 5 MG Tabs  Generic drug: apixaban      TK 1 T PO  BID   Refills: 5     losartan 50 MG Tabs  Commonly known as: Cozaar      Take 1 tablet (50 mg total) by mouth nightly.   Refills: 0     metoprolol succinate ER 50 MG Tb24  Commonly known as: Toprol XL      Take 0.5 tablets (25 mg total) by mouth nightly.   Refills: 0               Where to Get Your Medications        [id number] - [place], [place] - [address] AT [facility], [phone], [phone]  [address], [facility] [address]      Phone: [phone]   clopidogrel 75 MG Tabs  docusate sodium 100 MG Caps  HYDROcodone-acetaminophen 5-325 MG Tabs  ibuprofen 600 MG Tabs         Greater than 35 minutes spent, >50% spent counseling re: treatment plan and workup     Rinku Guerrier MD  4/14/2024

## 2024-04-14 NOTE — HISTORICAL OFFICE NOTE
Facility Logo [facility]  [address], [address] [place] [address]  [phone]      Kirstin Sanders  Progress Note  Demographics:  Name: Kirstin Sanders YOB: 1960  Age: 63, Female Medical Record No: [id number]  Visited Date/Time: 02/15/2024 03:50 PM    Chief Complaints  C/C: CRA (Cscope and hysterectomy)  F/U:  CAD  CHF  AF  Hyperlipidemia  History of Present Illness  The patient returns to follow-up the above conditions and concerns.  Her fatigue and exertional dyspnea completely resolved after her cardiac cath and LAD PCI. She needs a hysterectomy and oophorectomy due to uterine fibroids.  Cardiac risk factors Never smoked  Past Medical History  1.CAD (coronary artery disease)  2.Chronic systolic CHF (congestive heart failure), NYHA class 1  3.Cardiomyopathy, other - CMO  4.Mitral valve regurgitation  5.Atrial fibrillation, chronic  6.Hypertension (HTN), primary  7.Hyperlipidemia, mixed  8.Osteopenia  9.Abnormal uterine bleeding (AUB)  10.Otosclerosis of left ear  11.Long term current use of anticoagulant  Past Surgical History  1.Status post insertion of drug-eluting stent into left anterior descending (LAD) artery  Family History  1. Father - Family history of heart disease - FHx  2. Mother - No history of Family history of heart disease - FHx  Social History  Smoking status Never smoked  Tobacco usage - No (Non-smoker (finding))  Review of systems  Constitutional No history of Weight Loss and Anorexia  Eyes No history of Blurry vision, Visual changes and Double vision  ENT No history of Bloody nose (epistaxis) and Gingival bleeding  Hem/Lymphatic No history of Easy bruising, Blood clots, Hx of blood transfusion, Anemia and Bleeding problems  Physical Examination  Constitutional 93% O2  Vitals Right Arm Sitting  / 70 mmHg, Pulse rate 81 bpm, Height in 5' 3\", BMI: 23.2, Weight in 131 lbs (or) 59 kgs and BSA : 1.63 cc/m²  General Appearance No Acute Distress and  Well groomed  Head/Eyes/Ears/Nose/Mouth/Throat Sclera Clear and Mucous membranes Moist  Neck Normal carotid pulsations, No carotid bruits and No JVD  Respiratory Unlabored, Lungs clear with normal breath sounds and Equal bilaterally  Cardiovascular   Gait Normal gait  Upper Extremities No clubbing, No cyanosis and No edema  Skin Warm and dry  Neurologic / Psychiatric Alert and Oriented and Non-focal  Speech Normal speech  EKG/Other abnormalities  CV EXAM:  No JVP  Carotid pulses normal, no carotid bruits  Radial pulses normal  Irreg irreg, normal S1/S2, no murmur, rub, or gallop  No lower extremity edema   Allergies  1.Lisinopril(Reaction:, Severity:Mild)  2.losartan - Drug Class(Reaction:brain fog, dry mouth, leg cramps, Severity:Moderate)  3.Penicillins [Snomed:[id number]](Reaction:Dyspnea [Snomed:[id number]], Severity:Severe)  Medications (Info obtained by: Verbal)  1.ELIQUIS 5MG TABLETS, TAKE 1 TABLET BY MOUTH TWICE DAILY  2.Lipitor 40 mg tablet, Take 1 tablet orally once a day.  3.losartan 50 mg tablet, Take 1 tablet orally once a day.  4.metoprolol succinate ER 25 mg tablet,extended release 24 hr, Take 1 tablet orally once a day.  5.Plavix 75 mg tablet, Take 1 tablet orally once a day.  Impression  1.Preoperative cardiovascular examination  2.CAD (coronary artery disease)  3.Status post insertion of drug-eluting stent into left anterior descending (LAD) artery  4.Chronic systolic CHF (congestive heart failure), NYHA class 1  5.Cardiomyopathy, other - CMO  6.Mitral valve regurgitation  7.Atrial fibrillation, chronic  8.Hypertension (HTN), primary  9.Hyperlipidemia, mixed  10.Long term current use of anticoagulant  Assessment & Plan  Preoperative cardiac risk assessment  -Cardiac tests reviewed, as detailed below  -Pt is asymptomatic, without symptoms/signs of active ischemia or heart failure  -The pt is at low CV risk for the procedure.  All cardiac medications should be continued perioperatively, except as  follows: hold Eliquis x 2 days.  If surgeon requires a longer hold, she will need to be bridged with Lovenox.     Coronary artery disease  -S/p cardiac cath and LAD PCI, 1/18/24  -Symptoms of fatigue and dyspnea have resolved  -Continue Plavix.  No aspirin since on Eliquis.  -Continue Toprol XL  -Phase 2 Cardiac Rehab    Chronic systolic CHF  -Echo 11/29/23: EF 40-45%  -Likely ischemic CM  -Now NYHA Class 1 status after LAD PCI  -Continue current meds  -Echo scheduled in April, and see me after that    Atrial fibrillation, permanent  -Rate-controlled  -Continue Toprol-XL    Hypertension  -BP at goal  -Continue current meds    Hyperlipidemia  -Newly on Lipitor 40 mg nightly  -Recheck fasting lipids prior to April visit    Venous insufficiency  -She sees [facility] for her venous insufficiency, had treatments 2/2022    Long-term use of anticoagulant  -No bleeding problems  -Continue Eliquis  -Semiannual blood work to monitor   Labs and Diagnostics ordered  1.EKG (electrocardiogram) (Today)  Future appointments  1.Referral Visit - Eleazar Morales ([email or web address]) : (Today)  Miscellaneous  1.Weight monitoring (regime/therapy)  Nurses documentation  Use of Assistive Devices: None  Refills: Not at this time(PT)  Upcoming Sx: N/A  EKG: None   Patient instructions  -Okay to hold Eliquis for 2 days prior to surgery.  If the surgeon requests it held longer, you will need to be \"bridged\" with injectable Lovenox, and our office can help arrange that for you.  -Follow-up with tests and visit later this year, as previously scheduled.  Lab Details  COMP METABOLIC PANEL (14)  01/16/2024 11:06:16 AM  GLUCOSE 99 70-99 mg/dL  F  SODIUM 138 136-145 mmol/L  F  POTASSIUM 4.0 3.5-5.1 mmol/L  F  CHLORIDE 104  mmol/L  F  CO2 28.0 21.0-32.0 mmol/L  F  ANION GAP 6 0-18 mmol/L  F  BUN 15 9-23 mg/dL  F  CREATININE 0.80 0.55-1.02 mg/dL  F  BUN/ CREAT RATIO 18.8 10.0-20.0  F  CALCIUM 9.5 8.7-10.4  mg/dL  F  OSMOLALITY CALCULATED 287 275-295 mOsm/kg  F  E GFR CR 84 >=60 mL/min/1.73m2  F  ALT 15 10-49 U/L  F  AST 22 <=34 U/L  F  ALKALINE PHOSPHATASE 82  U/L  F  BILIRUBIN, TOTAL 1.3 0.2-1.1 mg/dL H F  TOTAL PROTEIN 7.6 5.7-8.2 g/dL  F  ALBUMIN 4.5 3.2-4.8 g/dL  F  GLOBULIN 3.1 2.8-4.4 g/dL  F  A/G RATIO 1.5 1.0-2.0  F  FASTING PATIENT CMP ANSWER Yes   F  CBC W/ DIFFERENTIAL  01/16/2024 10:29:53 AM  WBC 7.4 4.0-11.0 x10(3) uL  F  RED BLOOD COUNT 5.08 3.80-5.30 x10(6)uL  F  HGB 15.1 12.0-16.0 g/dL  F  HCT 47.9 35.0-48.0 %  F  MEAN CELL VOLUME 94.3 80.0-100.0 fL  F  MEAN CORPUSCULAR HEMOGLOBIN 29.7 26.0-34.0 pg  F  MEAN CORPUSCULAR HGB CONC 31.5 31.0-37.0 g/dL  F  RED CELL DISTRIBUTION WIDTH-SD 44.4 35.1-46.3 fL  F  RED CELL DISTRIBUTION WIDTH CV 12.8 11.0-15.0 %  F  PLATELETS 233.0 150.0-450.0 10(3)uL  F  NEUTROPHIL ABS PRELIM 3.70 1.50-7.70 x10 (3) uL  F  NEUTROPHIL ABSOLUTE 3.70 1.50-7.70 x10(3) uL  F  LYMPHOCYTE ABSOLUTE 3.02 1.00-4.00 x10(3) uL  F  MONOCYTE ABSOLUTE 0.45 0.10-1.00 x10(3) uL  F  EOSINOPHIL ABSOLUTE 0.16 0.00-0.70 x10(3) uL  F  BASOPHIL ABSOLUTE 0.02 0.00-0.20 x10(3) uL  F  IMMATURE GRANULOCYTE COUNT 0.01 0.00-1.00 x10(3) uL  F  NEUTROPHIL % 50.3 %  F  LYMPHOCYTE % 41.0 %  F  MONOCYTE % 6.1 %  F  EOSINOPHIL % 2.2 %  F  BASOPHIL % 0.3 %  F  IMMATURE GRANULOCYTE RATIO % 0.1 %  F  Diagnostics Details  Exercise Myocardial Perfusion Imaging 12/29/2023  1.Stress EKG is normal.    2.Maximal exercise treadmill test (MPHR : 96 %).    3.The functional capacity is good (9.8 METs).    4.Pt denied chest pain.    5.Occasional PVC's.    1.This is an abnormal perfusion study.    2.This scan is suggestive of low to moderate risk for future cardiovascular events.    3.The left ventricular cavity is noted to be normal on the stress study. The left ventricular ejection fraction was calculated to be 41% and left ventricular global function is mildly reduced.    4.The study quality is average.    5.Small size  moderate intensity reversible antioapical defect.    Trans Thoracic Echocardiogram 11/29/2023  1.The left ventricle is normal in size. Mild left ventricular hypertrophy. Global left ventricular systolic function is mildly decreased. The left ventricular ejection fraction is 40-45%. Left ventricular diastolic function is indeterminate.    2.Mild aortic regurgitation.    3.Moderate mitral regurgitation.    4.Moderate tricuspid regurgitation.    5.The left atrial diameter is severely increased.    6.The right atrium is moderately enlarged.    7.The estimated pulmonary artery systolic pressure is 46 mmHg assuming a right atrial pressure of 8 mmHg. Evidence of pulmonary hypertension is noted.    8.The study quality is good.    CPOE Orders carried out by: Romy LOERA  Care Providers: Nadia Borrero MD, Romy LOERA and Ronaldo TURPIN  Electronically Authenticated by  Nadia Borrero MD  02/16/2024 04:34:57 PM  Disclaimer: Components of this note were documented using voice recognition system and are subject to errors not corrected at proofreading. Contact the author of this note for any clarifications.

## 2024-04-14 NOTE — PROGRESS NOTES
Kirstin is cleared for discharge by cardiology, surgery and hospitalist. Discharge paperwork given and reviewed with patient and family. Explained to restart eliquis tonight and plavix Wednesday April 17 per Dr. Mcmahon. All other medications and post op instructions reviewed. Follow up with surgery and cardiology outpatient. Pt and spouse verbalize understanding. Her pharmacy never received the norco Dr Mcmahon sent and there was no printed prescription present in chart or with patient. Prescription sent electronically to pharmacy by Dr. Guerrier. All questions answered at this time.

## 2024-04-15 ENCOUNTER — PATIENT OUTREACH (OUTPATIENT)
Dept: CASE MANAGEMENT | Age: 64
End: 2024-04-15

## 2024-04-16 ENCOUNTER — PATIENT MESSAGE (OUTPATIENT)
Dept: INTERNAL MEDICINE CLINIC | Facility: CLINIC | Age: 64
End: 2024-04-16

## 2024-05-06 ENCOUNTER — TELEPHONE (OUTPATIENT)
Dept: OBGYN CLINIC | Facility: CLINIC | Age: 64
End: 2024-05-06

## 2024-05-06 NOTE — TELEPHONE ENCOUNTER
Visit notes dated 4/24/2024 received from the [facility] and placed on Dr. Monaco's desk for review.

## 2024-06-14 ENCOUNTER — LAB ENCOUNTER (OUTPATIENT)
Dept: LAB | Facility: HOSPITAL | Age: 64
End: 2024-06-14
Attending: NURSE PRACTITIONER

## 2024-06-14 DIAGNOSIS — E78.2 MIXED HYPERLIPIDEMIA: ICD-10-CM

## 2024-06-14 DIAGNOSIS — I25.10 CORONARY ATHEROSCLEROSIS OF NATIVE CORONARY ARTERY: Primary | ICD-10-CM

## 2024-06-14 DIAGNOSIS — I42.0 CONGESTIVE CARDIOMYOPATHY (HCC): ICD-10-CM

## 2024-06-14 LAB
ANION GAP SERPL CALC-SCNC: 6 MMOL/L (ref 0–18)
BUN BLD-MCNC: 14 MG/DL (ref 9–23)
BUN/CREAT SERPL: 19.7 (ref 10–20)
CALCIUM BLD-MCNC: 9.7 MG/DL (ref 8.7–10.4)
CHLORIDE SERPL-SCNC: 106 MMOL/L (ref 98–112)
CO2 SERPL-SCNC: 26 MMOL/L (ref 21–32)
CREAT BLD-MCNC: 0.71 MG/DL
EGFRCR SERPLBLD CKD-EPI 2021: 96 ML/MIN/1.73M2 (ref 60–?)
FASTING STATUS PATIENT QL REPORTED: NO
GLUCOSE BLD-MCNC: 108 MG/DL (ref 70–99)
OSMOLALITY SERPL CALC.SUM OF ELEC: 287 MOSM/KG (ref 275–295)
POTASSIUM SERPL-SCNC: 4.4 MMOL/L (ref 3.5–5.1)
SODIUM SERPL-SCNC: 138 MMOL/L (ref 136–145)

## 2024-06-14 PROCEDURE — 80048 BASIC METABOLIC PNL TOTAL CA: CPT

## 2024-06-14 PROCEDURE — 36415 COLL VENOUS BLD VENIPUNCTURE: CPT

## 2024-06-17 ENCOUNTER — TELEPHONE (OUTPATIENT)
Dept: OBGYN CLINIC | Facility: CLINIC | Age: 64
End: 2024-06-17

## 2024-06-17 NOTE — TELEPHONE ENCOUNTER
Records received for [facility].  DOS 6/5/24    To Dr. Monaco for review.

## 2024-06-29 ENCOUNTER — APPOINTMENT (OUTPATIENT)
Dept: GENERAL RADIOLOGY | Facility: HOSPITAL | Age: 64
End: 2024-06-29
Attending: EMERGENCY MEDICINE
Payer: COMMERCIAL

## 2024-06-29 ENCOUNTER — HOSPITAL ENCOUNTER (INPATIENT)
Facility: HOSPITAL | Age: 64
LOS: 2 days | Discharge: HOME | End: 2024-07-01
Attending: EMERGENCY MEDICINE | Admitting: INTERNAL MEDICINE
Payer: COMMERCIAL

## 2024-06-29 DIAGNOSIS — I50.9 ACUTE ON CHRONIC CONGESTIVE HEART FAILURE, UNSPECIFIED HEART FAILURE TYPE (HCC): Primary | ICD-10-CM

## 2024-06-29 DIAGNOSIS — R79.89 ELEVATED TROPONIN: ICD-10-CM

## 2024-06-29 LAB
ANION GAP SERPL CALC-SCNC: 6 MMOL/L (ref 0–18)
BASOPHILS # BLD AUTO: 0.02 X10(3) UL (ref 0–0.2)
BASOPHILS NFR BLD AUTO: 0.2 %
BNP SERPL-MCNC: 1295 PG/ML
BUN BLD-MCNC: 13 MG/DL (ref 9–23)
BUN/CREAT SERPL: 14 (ref 10–20)
CALCIUM BLD-MCNC: 9.3 MG/DL (ref 8.7–10.4)
CHLORIDE SERPL-SCNC: 109 MMOL/L (ref 98–112)
CHOLEST SERPL-MCNC: 124 MG/DL (ref ?–200)
CO2 SERPL-SCNC: 28 MMOL/L (ref 21–32)
CREAT BLD-MCNC: 0.93 MG/DL
DEPRECATED RDW RBC AUTO: 43.6 FL (ref 35.1–46.3)
EGFRCR SERPLBLD CKD-EPI 2021: 69 ML/MIN/1.73M2 (ref 60–?)
EOSINOPHIL # BLD AUTO: 0.1 X10(3) UL (ref 0–0.7)
EOSINOPHIL NFR BLD AUTO: 1 %
ERYTHROCYTE [DISTWIDTH] IN BLOOD BY AUTOMATED COUNT: 13.1 % (ref 11–15)
GLUCOSE BLD-MCNC: 111 MG/DL (ref 70–99)
HCT VFR BLD AUTO: 39.9 %
HDLC SERPL-MCNC: 44 MG/DL (ref 40–59)
HGB BLD-MCNC: 13.4 G/DL
IMM GRANULOCYTES # BLD AUTO: 0.03 X10(3) UL (ref 0–1)
IMM GRANULOCYTES NFR BLD: 0.3 %
LDLC SERPL CALC-MCNC: 60 MG/DL (ref ?–100)
LYMPHOCYTES # BLD AUTO: 3.03 X10(3) UL (ref 1–4)
LYMPHOCYTES NFR BLD AUTO: 30.4 %
MCH RBC QN AUTO: 30.7 PG (ref 26–34)
MCHC RBC AUTO-ENTMCNC: 33.6 G/DL (ref 31–37)
MCV RBC AUTO: 91.5 FL
MONOCYTES # BLD AUTO: 0.65 X10(3) UL (ref 0.1–1)
MONOCYTES NFR BLD AUTO: 6.5 %
NEUTROPHILS # BLD AUTO: 6.14 X10 (3) UL (ref 1.5–7.7)
NEUTROPHILS # BLD AUTO: 6.14 X10(3) UL (ref 1.5–7.7)
NEUTROPHILS NFR BLD AUTO: 61.6 %
NONHDLC SERPL-MCNC: 80 MG/DL (ref ?–130)
OSMOLALITY SERPL CALC.SUM OF ELEC: 297 MOSM/KG (ref 275–295)
PLATELET # BLD AUTO: 249 10(3)UL (ref 150–450)
POTASSIUM SERPL-SCNC: 3.6 MMOL/L (ref 3.5–5.1)
RBC # BLD AUTO: 4.36 X10(6)UL
SODIUM SERPL-SCNC: 143 MMOL/L (ref 136–145)
TRIGL SERPL-MCNC: 106 MG/DL (ref 30–149)
TROPONIN I SERPL HS-MCNC: 71 NG/L
TROPONIN I SERPL HS-MCNC: 75 NG/L
TROPONIN I SERPL HS-MCNC: 81 NG/L
VLDLC SERPL CALC-MCNC: 16 MG/DL (ref 0–30)
WBC # BLD AUTO: 10 X10(3) UL (ref 4–11)

## 2024-06-29 PROCEDURE — 71045 X-RAY EXAM CHEST 1 VIEW: CPT | Performed by: EMERGENCY MEDICINE

## 2024-06-29 PROCEDURE — 99223 1ST HOSP IP/OBS HIGH 75: CPT | Performed by: INTERNAL MEDICINE

## 2024-06-29 RX ORDER — HYDROCODONE BITARTRATE AND ACETAMINOPHEN 5; 325 MG/1; MG/1
1 TABLET ORAL EVERY 4 HOURS PRN
Status: DISCONTINUED | OUTPATIENT
Start: 2024-06-29 | End: 2024-07-01

## 2024-06-29 RX ORDER — POTASSIUM CHLORIDE 20 MEQ/1
40 TABLET, EXTENDED RELEASE ORAL EVERY 4 HOURS
Status: COMPLETED | OUTPATIENT
Start: 2024-06-29 | End: 2024-06-29

## 2024-06-29 RX ORDER — ACETAMINOPHEN 500 MG
500 TABLET ORAL EVERY 4 HOURS PRN
Status: DISCONTINUED | OUTPATIENT
Start: 2024-06-29 | End: 2024-07-01

## 2024-06-29 RX ORDER — FUROSEMIDE 10 MG/ML
40 INJECTION INTRAMUSCULAR; INTRAVENOUS ONCE
Status: COMPLETED | OUTPATIENT
Start: 2024-06-29 | End: 2024-06-29

## 2024-06-29 RX ORDER — CLOPIDOGREL BISULFATE 75 MG/1
75 TABLET ORAL DAILY
Status: DISCONTINUED | OUTPATIENT
Start: 2024-06-29 | End: 2024-07-01

## 2024-06-29 RX ORDER — METOPROLOL SUCCINATE 25 MG/1
25 TABLET, EXTENDED RELEASE ORAL NIGHTLY
Status: DISCONTINUED | OUTPATIENT
Start: 2024-06-29 | End: 2024-07-01

## 2024-06-29 RX ORDER — DOCUSATE SODIUM 100 MG/1
100 CAPSULE, LIQUID FILLED ORAL 2 TIMES DAILY PRN
Status: DISCONTINUED | OUTPATIENT
Start: 2024-06-29 | End: 2024-07-01

## 2024-06-29 RX ORDER — FUROSEMIDE 10 MG/ML
40 INJECTION INTRAMUSCULAR; INTRAVENOUS
Status: DISCONTINUED | OUTPATIENT
Start: 2024-06-30 | End: 2024-07-01

## 2024-06-29 RX ORDER — SPIRONOLACTONE 25 MG/1
12.5 TABLET ORAL DAILY
COMMUNITY
Start: 2024-06-20

## 2024-06-29 RX ORDER — HYDROCODONE BITARTRATE AND ACETAMINOPHEN 5; 325 MG/1; MG/1
2 TABLET ORAL EVERY 4 HOURS PRN
Status: DISCONTINUED | OUTPATIENT
Start: 2024-06-29 | End: 2024-07-01

## 2024-06-29 RX ORDER — ATORVASTATIN CALCIUM 40 MG/1
40 TABLET, FILM COATED ORAL NIGHTLY
Status: DISCONTINUED | OUTPATIENT
Start: 2024-06-29 | End: 2024-07-01

## 2024-06-29 RX ORDER — ONDANSETRON 2 MG/ML
4 INJECTION INTRAMUSCULAR; INTRAVENOUS EVERY 6 HOURS PRN
Status: DISCONTINUED | OUTPATIENT
Start: 2024-06-29 | End: 2024-07-01

## 2024-06-29 RX ORDER — ACETAMINOPHEN 325 MG/1
650 TABLET ORAL EVERY 4 HOURS PRN
Status: DISCONTINUED | OUTPATIENT
Start: 2024-06-29 | End: 2024-07-01

## 2024-06-29 RX ORDER — LOSARTAN POTASSIUM 50 MG/1
50 TABLET ORAL NIGHTLY
Status: DISCONTINUED | OUTPATIENT
Start: 2024-06-29 | End: 2024-06-30

## 2024-06-29 NOTE — ED PROVIDER NOTES
Patient Seen in: [facility] Emergency Department    History     Chief Complaint   Patient presents with    Difficulty Breathing    Chest Pain Angina       HPI    64-year-old female presents ER with complaint of worsening shortness of breath for the past 2 days as well as chest pain.  Patient with a past medical history of A-fib, CHF, hypertension.  Patient currently on Plavix as well as Eliquis.  Patient states that she has been having orthopnea of worsening the last few days as well as dyspnea on exertion.  Patient states that her chest pain is also continued with 2 out of 10 chest pain.    History reviewed.   Past Medical History:    A-fib (HCC)    Cardiomyopathy (HCC)    EF 45%    Heart failure (HCC)    High blood pressure    High cholesterol    Shoulder fracture, left    Surgical repair; ok since     Visual impairment    contacts       History reviewed.   Past Surgical History:   Procedure Laterality Date    Bunionectomy Bilateral     Cath bare metal stent (bms)      Colonoscopy N/A 02/26/2024    with polypectomy, clip x 3    Colonoscopy N/A 02/26/2024    Procedure: COLONOSCOPY with polypectomy, clip x 3;  Surgeon: Aashish Jeff MD;  Location: [facility] ENDOSCOPY    Other surgical history Right     shoulder and humerous fractures repaired    Other surgical history Bilateral     ear pinning    Other surgical history Left     Ear stapectomy,failed    Pt w/ coronary artery stent  01/18/2024         Medications :  (Not in a hospital admission)       Family History   Problem Relation Age of Onset    Heart Disorder Father         Heart valve disease    Cancer Mother 61        ovarian    Ovarian Cancer Mother 61    Breast Cancer Paternal Aunt         50s       Smoking Status:   Social History     Socioeconomic History    Marital status:    Tobacco Use    Smoking status: Never    Smokeless tobacco: Never   Vaping Use    Vaping status: Never Used   Substance and Sexual Activity    Alcohol use: Yes      Alcohol/week: 2.0 standard drinks of alcohol     Types: 1 Glasses of wine, 1 Cans of beer per week     Comment: Weekend max 2    Drug use: No    Sexual activity: Yes     Partners: Male   Other Topics Concern    Caffeine Concern No    Exercise No    Right Handed Yes       ROS  All pertinent positives for the review of systems are mentioned in the HPI  All other organ systems are reviewed and are negative.    Constitutional and vital signs reviewed.      Social History and Family History elements reviewed from today, pertinent positives to the presenting problem noted.    Physical Exam     ED Triage Vitals [06/29/24 1457]   /90   Pulse 103   Resp 20   Temp 98 °F (36.7 °C)   Temp src Oral   SpO2 99 %   O2 Device None (Room air)       All measures to prevent infection transmission during my interaction with the patient were taken. The patient was already wearing a droplet mask on my arrival to the room. Personal protective equipment including droplet mask, eye protection, and gloves were worn throughout the duration of the exam.  Handwashing was performed prior to and after the exam.  Stethoscope and any equipment used during my examination was cleaned with super sani-cloth germicidal wipes following the exam.     Physical Exam  Vitals and nursing note reviewed.   Constitutional:       Appearance: She is well-developed.   HENT:      Head: Normocephalic and atraumatic.      Right Ear: External ear normal.      Left Ear: External ear normal.      Nose: Nose normal.   Eyes:      Conjunctiva/sclera: Conjunctivae normal.      Pupils: Pupils are equal, round, and reactive to light.   Cardiovascular:      Rate and Rhythm: Normal rate and regular rhythm.      Heart sounds: Normal heart sounds.   Pulmonary:      Effort: Pulmonary effort is normal.      Breath sounds: Examination of the right-lower field reveals decreased breath sounds. Examination of the left-lower field reveals decreased breath sounds. Decreased breath  sounds present. No wheezing, rhonchi or rales.   Abdominal:      General: Bowel sounds are normal.      Palpations: Abdomen is soft.   Musculoskeletal:         General: Normal range of motion.      Cervical back: Normal range of motion and neck supple.      Right lower leg: No tenderness. No edema.      Left lower leg: No tenderness. No edema.   Skin:     General: Skin is warm and dry.   Neurological:      Mental Status: She is alert and oriented to person, place, and time.      Deep Tendon Reflexes: Reflexes are normal and symmetric.   Psychiatric:         Behavior: Behavior normal.         Thought Content: Thought content normal.         Judgment: Judgment normal.         ED Course        Labs Reviewed   BASIC METABOLIC PANEL (8) - Abnormal; Notable for the following components:       Result Value    Glucose 111 (*)     Calculated Osmolality 297 (*)     All other components within normal limits   TROPONIN I HIGH SENSITIVITY - Abnormal; Notable for the following components:    Troponin I (High Sensitivity) 81 (*)     All other components within normal limits   BNP (B TYPE NATRIURETIC PEPTIDE) - Abnormal; Notable for the following components:    Beta Natriuretic Peptide 1,295 (*)     All other components within normal limits   LIPID PANEL - Normal   CBC WITH DIFFERENTIAL WITH PLATELET    Narrative:     The following orders were created for panel order CBC With Differential With Platelet.                  Procedure                               Abnormality         Status                                     ---------                               -----------         ------                                     CBC W/ DIFFERENTIAL[[id number]]                              Final result                                                 Please view results for these tests on the individual orders.   TROPONIN I HIGH SENSITIVITY   CBC W/ DIFFERENTIAL     EKG    Rate, intervals and axes as noted on EKG Report.  Rate: 95  Rhythm:  Atrial Fibrillation  Reading: Incomplete right bundle branch block, no ST deviation, normal axis             Imaging Results Available and Reviewed while in ED: XR CHEST AP PORTABLE  (CPT=71045)    Result Date: 6/29/2024  CONCLUSION: Cardiomegaly.  Mild pulmonary edema.  Correlate for CHF.   Dictated by (CST): Genaro Mejia MD on 6/29/2024 at 3:54 PM     Finalized by (CST): Genaro Mejia MD on 6/29/2024 at 3:55 PM         ED Medications Administered:   Medications   furosemide (Lasix) 10 mg/mL injection 40 mg (40 mg Intravenous Given 6/29/24 1642)         MDM     Vitals:    06/29/24 1457 06/29/24 1600   BP: 154/90 (!) 133/94   Pulse: 103 81   Resp: 20 14   Temp: 98 °F (36.7 °C)    TempSrc: Oral    SpO2: 99% 97%   Weight: 58.1 kg    Height: 160 cm (5' 3\")      *I personally reviewed and interpreted all ED vitals.  I also personally reviewed all labs and imaging if ordered    Pulse Ox: 99%, Room air, Normal     Monitor Interpretation:   atrial fibrillation, with ventricular rate of 92    Differential Diagnosis/ Diagnostic Considerations: CHF exacerbation, PE, pneumonia, COVID-19, ACS    Medical Record Review: I personally reviewed available prior medical records for any recent pertinent discharge summaries, testing, and procedures and reviewed those reports.    Complicating Factors: The patient already has does not have any pertinent problems on file. to contribute to the complexity of this ED evaluation.    Medical Decision Making  64-year-old female presents ER with complaint of progressive shortness of breath for the past few days.  Patient also complained of some mild chest pain.  Patient with a past medical history of CHF.  Patient with diminished breath sounds in the bases bilaterally.  Patient with cardiomegaly with pulmonary congestion as well as elevated BNP.  Patient given 40 mg of Lasix IV and will be admitted to hospital for further evaluation.  [facility] notified of admission.  Hospitalist notified  of admission.  Patient's  bedside made aware of the disposition and admission.    Total Critical Care Time: 32 minutes including time spent examining and re-evaluating the patient, ordering and reviewing laboratory tests, documenting, reviewing previous records, obtaining information from the family, and speaking with consultants, admitting doctors, nurses and medics and excludes any time spent on procedures.      Problems Addressed:  Acute on chronic congestive heart failure, unspecified heart failure type (HCC): acute illness or injury  Elevated troponin: acute illness or injury    Amount and/or Complexity of Data Reviewed  Independent Historian:      Details: Medical history obtained from the  who is bedside states that she been aggressive complaining of worsening shortness of breath specially when laying flat or exertion  Labs: ordered. Decision-making details documented in ED Course.  Radiology: ordered and independent interpretation performed. Decision-making details documented in ED Course.     Details: Chest x-ray interpreted by myself shows pulmonary congestion and cardiomegaly.        Condition upon leaving the department: Stable    Disposition and Plan     Clinical Impression:  1. Acute on chronic congestive heart failure, unspecified heart failure type (HCC)    2. Elevated troponin        Disposition:  Admit    Follow-up:  No follow-up provider specified.    Medications Prescribed:  Current Discharge Medication List          Hospital Problems       Present on Admission  Date Reviewed: 4/12/2024            ICD-10-CM Noted POA    * (Principal) Acute on chronic congestive heart failure, unspecified heart failure type (HCC) I50.9 6/29/2024 Unknown

## 2024-06-29 NOTE — ED INITIAL ASSESSMENT (HPI)
Pt ambulatory to ED A&O x 4 w/ c/o DEVAN x 1 week, significantly worse since last night.  Pt reporting increased CLOUD.  Pt reporting L sided chest \"tightness.\"  Pt in NAD, speaking full, complete sentences w/ out any apparent difficulty/distress.  Pt w/ hx of stents and a-fib.

## 2024-06-29 NOTE — H&P
[facility]  part of [facility]  HISTORY AND PHYSICAL       Kirstin Sanedrs Patient Status:  Emergency    3/6/1960  64 year old CSN [id number]   Location 44 Attending Gm Mckeon DO     PCP Eleazar Morales MD         DATE OF ADMISSION: 2024     CHIEF COMPLAINT: Shortness of breath    HISTORY OF PRESENT ILLNESS  This is a 64 year oldfemale who presents complaining of shortness of breath.  Patient states that symptoms have been present for the past week.  Patient reports shortness past with initially mild and intermittent.  Around 2 days prior to admission symptoms were progressively worsening.  Patient states she began developing worsening shortness of breath when laying flat.  Became very short of breath with even minimal exertion.  Due to progression of symptoms prompted visit to ED for further evaluation.  Patient also described mild chest discomfort.  Of note, patient has a history of atrial fibrillation, CHF and hypertension.  Patient is on Plavix and Eliquis.  Patient states she was recently started on spironolactone as her only diuretic.  Upon further review, patient states she is recently been on vacation and states she has been trying to adhere to the fluid restriction, but has been eating out more frequently.    PAST MEDICAL HISTORY  Past Medical History:    A-fib (HCC)    Cardiomyopathy (HCC)    EF 45%    Heart failure (HCC)    High blood pressure    High cholesterol    Shoulder fracture, left    Surgical repair; ok since     Visual impairment    contacts        PAST SURGICAL HISTORY  Past Surgical History:   Procedure Laterality Date    Bunionectomy Bilateral     Cath bare metal stent (bms)      Colonoscopy N/A 2024    with polypectomy, clip x 3    Colonoscopy N/A 2024    Procedure: COLONOSCOPY with polypectomy, clip x 3;  Surgeon: Aashish Jeff MD;  Location: [facility] ENDOSCOPY    Other surgical history Right     shoulder and humerous fractures  repaired    Other surgical history Bilateral     ear pinning    Other surgical history Left     Ear stapectomy,failed    Pt w/ coronary artery stent  01/18/2024       ALLERGIES   Hctz [hydrochlorothiazide], Lisinopril, and Pcn [penicillins]    MEDICATIONS  Patient's Medications   New Prescriptions    No medications on file   Previous Medications    ATORVASTATIN 40 MG ORAL TAB    Take 1 tablet (40 mg total) by mouth nightly.    CLOPIDOGREL 75 MG ORAL TAB    Take 1 tablet (75 mg total) by mouth daily.    DOCUSATE SODIUM 100 MG ORAL CAP    Take 1 capsule (100 mg total) by mouth 2 (two) times daily as needed for constipation.    ELIQUIS 5 MG ORAL TAB    TK 1 T PO  BID    HYDROCODONE-ACETAMINOPHEN 5-325 MG ORAL TAB    Take 1 tablet by mouth every 6 (six) hours as needed for Pain.    IBUPROFEN 600 MG ORAL TAB    Take 1 tablet (600 mg total) by mouth every 8 (eight) hours as needed for Pain.    LOSARTAN 50 MG ORAL TAB    Take 1 tablet (50 mg total) by mouth nightly.    METOPROLOL SUCCINATE ER 50 MG ORAL TABLET 24 HR    Take 0.5 tablets (25 mg total) by mouth nightly.   Modified Medications    No medications on file   Discontinued Medications    No medications on file       SOCIAL HISTORY  Social History     Socioeconomic History    Marital status:    Tobacco Use    Smoking status: Never    Smokeless tobacco: Never   Vaping Use    Vaping status: Never Used   Substance and Sexual Activity    Alcohol use: Yes     Alcohol/week: 2.0 standard drinks of alcohol     Types: 1 Glasses of wine, 1 Cans of beer per week     Comment: Weekend max 2    Drug use: No    Sexual activity: Yes     Partners: Male   Other Topics Concern    Caffeine Concern No    Exercise No    Right Handed Yes       FAMILY HISTORY  Family History   Problem Relation Age of Onset    Heart Disorder Father         Heart valve disease    Cancer Mother 61        ovarian    Ovarian Cancer Mother 61    Breast Cancer Paternal Aunt         50s       PHYSICAL  EXAM  Vital signs:  BP (!) 133/94   Pulse 81   Temp 98 °F (36.7 °C) (Oral)   Resp 14   Ht 5' 3\" (1.6 m)   Wt 128 lb (58.1 kg)   SpO2 97%   BMI 22.67 kg/m²      GENERAL:  Awake and alert, in no acute distress.  HEART: Irregular rhythm.  Regular rate   LUNGS:  Air entry was decreased.  No increased work of breathing or wheezes   ABDOMEN: Soft and non-tender.   EXTREMITIES: No edema appreciated  PSYCHIATRIC: Normal mood      IMAGING  XR CHEST AP PORTABLE  (CPT=71045)    Result Date: 6/29/2024  CONCLUSION: Cardiomegaly.  Mild pulmonary edema.  Correlate for CHF.   Dictated by (CST): Genaro Mejia MD on 6/29/2024 at 3:54 PM     Finalized by (CST): Genaro Mejia MD on 6/29/2024 at 3:55 PM           Data:  Recent Labs   Lab 06/29/24  1539   RBC 4.36   HGB 13.4   HCT 39.9   MCV 91.5   MCH 30.7   MCHC 33.6   RDW 13.1   NEPRELIM 6.14   WBC 10.0   .0     Recent Labs   Lab 06/29/24  1539   *   BUN 13   CREATSERUM 0.93   CA 9.3      K 3.6      CO2 28.0       ASSESSMENT/PLAN    Acute on chronic heart failure with reduced ejection fraction   -Patient presenting with progressive shortness of breath  -Chest x-ray reviewed, noted mild pulmonary edema  -BNP noted to be elevated  -Last EF noted to be 40 to 45%  -Starting diuresis with Lasix 40mg IV BID  - Check Echo  - Strict I&Os, Daily weights, Fluid restriction  - Cardiology on consult, appreciate recs.     Troponin elevation  -Patient presenting with progressive shortness of breath and mild chest pain  -Likely secondary to acute CHF exacerbation as above  -Continue trending troponins  -Continue dual antiplatelets and statin  -Cardiology on consult, appreciate further recommendations    Permanent atrial fibrillation  -Rates currently at goal, restart home metoprolol  -Patient on chronic anticoagulation with Eliquis, will continue  -Telemetry monitoring  -Cardiology consulted, appreciate further recommendations    History of CAD  -Status post PCI in  January 2024  -Restart home antiplatelet therapy  -Restart statin    HTN  - controlled  - CPM  - Monitor and adjust as needed      Plan of care discussed with patient and  at bedside.  Discussed with ED physician and RN. Decision made that pt needs hospitalization for further management/monitoring.      Apolinar Cartagena MD    This note was prepared using Dragon Medical voice recognition dictation software. As a result errors may occur. When identified these errors have been corrected. While every attempt is made to correct errors during dictation discrepancies may still exist

## 2024-06-30 ENCOUNTER — APPOINTMENT (OUTPATIENT)
Dept: CV DIAGNOSTICS | Facility: HOSPITAL | Age: 64
End: 2024-06-30
Attending: INTERNAL MEDICINE
Payer: COMMERCIAL

## 2024-06-30 LAB
ALBUMIN SERPL-MCNC: 4.9 G/DL (ref 3.2–4.8)
ALBUMIN/GLOB SERPL: 1.6 {RATIO} (ref 1–2)
ALP LIVER SERPL-CCNC: 113 U/L
ALT SERPL-CCNC: 30 U/L
ANION GAP SERPL CALC-SCNC: 6 MMOL/L (ref 0–18)
ANION GAP SERPL CALC-SCNC: 9 MMOL/L (ref 0–18)
AST SERPL-CCNC: 27 U/L (ref ?–34)
BASOPHILS # BLD AUTO: 0.03 X10(3) UL (ref 0–0.2)
BASOPHILS NFR BLD AUTO: 0.4 %
BILIRUB SERPL-MCNC: 2.5 MG/DL (ref 0.2–1.1)
BUN BLD-MCNC: 10 MG/DL (ref 9–23)
BUN BLD-MCNC: 27 MG/DL (ref 9–23)
BUN/CREAT SERPL: 12 (ref 10–20)
BUN/CREAT SERPL: 29.7 (ref 10–20)
CALCIUM BLD-MCNC: 10 MG/DL (ref 8.7–10.4)
CALCIUM BLD-MCNC: 10.1 MG/DL (ref 8.7–10.4)
CHLORIDE SERPL-SCNC: 101 MMOL/L (ref 98–112)
CHLORIDE SERPL-SCNC: 104 MMOL/L (ref 98–112)
CO2 SERPL-SCNC: 28 MMOL/L (ref 21–32)
CO2 SERPL-SCNC: 30 MMOL/L (ref 21–32)
CREAT BLD-MCNC: 0.83 MG/DL
CREAT BLD-MCNC: 0.91 MG/DL
DEPRECATED RDW RBC AUTO: 45.1 FL (ref 35.1–46.3)
EGFRCR SERPLBLD CKD-EPI 2021: 71 ML/MIN/1.73M2 (ref 60–?)
EGFRCR SERPLBLD CKD-EPI 2021: 80 ML/MIN/1.73M2 (ref 60–?)
EOSINOPHIL # BLD AUTO: 0.18 X10(3) UL (ref 0–0.7)
EOSINOPHIL NFR BLD AUTO: 2.2 %
ERYTHROCYTE [DISTWIDTH] IN BLOOD BY AUTOMATED COUNT: 13.2 % (ref 11–15)
GLOBULIN PLAS-MCNC: 3.1 G/DL (ref 2–3.5)
GLUCOSE BLD-MCNC: 112 MG/DL (ref 70–99)
GLUCOSE BLD-MCNC: 126 MG/DL (ref 70–99)
HCT VFR BLD AUTO: 46.6 %
HGB BLD-MCNC: 15.3 G/DL
IMM GRANULOCYTES # BLD AUTO: 0.02 X10(3) UL (ref 0–1)
IMM GRANULOCYTES NFR BLD: 0.2 %
LYMPHOCYTES # BLD AUTO: 3.35 X10(3) UL (ref 1–4)
LYMPHOCYTES NFR BLD AUTO: 40.4 %
MAGNESIUM SERPL-MCNC: 1.7 MG/DL (ref 1.6–2.6)
MAGNESIUM SERPL-MCNC: 1.9 MG/DL (ref 1.6–2.6)
MCH RBC QN AUTO: 30.8 PG (ref 26–34)
MCHC RBC AUTO-ENTMCNC: 32.8 G/DL (ref 31–37)
MCV RBC AUTO: 93.8 FL
MONOCYTES # BLD AUTO: 0.46 X10(3) UL (ref 0.1–1)
MONOCYTES NFR BLD AUTO: 5.5 %
NEUTROPHILS # BLD AUTO: 4.25 X10 (3) UL (ref 1.5–7.7)
NEUTROPHILS # BLD AUTO: 4.25 X10(3) UL (ref 1.5–7.7)
NEUTROPHILS NFR BLD AUTO: 51.3 %
OSMOLALITY SERPL CALC.SUM OF ELEC: 291 MOSM/KG (ref 275–295)
OSMOLALITY SERPL CALC.SUM OF ELEC: 292 MOSM/KG (ref 275–295)
PLATELET # BLD AUTO: 272 10(3)UL (ref 150–450)
POTASSIUM SERPL-SCNC: 4 MMOL/L (ref 3.5–5.1)
POTASSIUM SERPL-SCNC: 4 MMOL/L (ref 3.5–5.1)
POTASSIUM SERPL-SCNC: 4.5 MMOL/L (ref 3.5–5.1)
PROT SERPL-MCNC: 8 G/DL (ref 5.7–8.2)
Q-T INTERVAL: 344 MS
QRS DURATION: 96 MS
QTC CALCULATION (BEZET): 432 MS
R AXIS: -81 DEGREES
RBC # BLD AUTO: 4.97 X10(6)UL
SODIUM SERPL-SCNC: 137 MMOL/L (ref 136–145)
SODIUM SERPL-SCNC: 141 MMOL/L (ref 136–145)
T AXIS: 100 DEGREES
TROPONIN I SERPL HS-MCNC: 70 NG/L
VENTRICULAR RATE: 95 BPM
WBC # BLD AUTO: 8.3 X10(3) UL (ref 4–11)

## 2024-06-30 PROCEDURE — 93306 TTE W/DOPPLER COMPLETE: CPT | Performed by: INTERNAL MEDICINE

## 2024-06-30 PROCEDURE — 99233 SBSQ HOSP IP/OBS HIGH 50: CPT | Performed by: INTERNAL MEDICINE

## 2024-06-30 NOTE — PROGRESS NOTES
New admission. Patient is alert and oriented x4 and on RA.  Frequent rounding done throughout the shift. Safety precautions in place.

## 2024-06-30 NOTE — HISTORICAL OFFICE NOTE
[facility]  [address], [address] [place] [address]  [phone]      Kirstin Sanders  Progress Note  Demographics:  Name: Kirstin Sanders YOB: 1960  Age: 64, Female Medical Record No: [id number]  Visited Date/Time: 05/21/2024 11:30 AM    Chief Complaints  C/C: CRA (Cscope and hysterectomy)  F/U:  CAD  CHF  AF  Hyperlipidemia  History of Present Illness  The patient returns to follow-up the above conditions and concerns.  Her fatigue and exertional dyspnea completely resolved after her cardiac cath and LAD PCI. She needs a hysterectomy and oophorectomy due to uterine fibroids.  Cardiac risk factors Never smoked  Past Medical History  1.Chronic systolic CHF (congestive heart failure), NYHA class 1  2.CAD (coronary artery disease)  3.Mitral valve regurgitation  4.Atrial fibrillation, chronic  5.Hypertension (HTN), primary  6.Hyperlipidemia, mixed  7.Osteopenia  8.Otosclerosis of left ear  9.Cancer of uterus  10.Long term current use of anticoagulant  Past Surgical History  1.Status post insertion of drug-eluting stent into left anterior descending (LAD) artery  2.History of hysterectomy - Hx  Family History  1. Father - Family history of heart disease - FHx  2. Mother - No history of Family history of heart disease - FHx  Social History  Smoking status Never smoked  Tobacco usage - No (Never smoked tobacco (finding))  Review of systems  Constitutional No history of Weight Loss and Anorexia  Eyes No history of Blurry vision, Visual changes and Double vision  ENT No history of Bloody nose (epistaxis) and Gingival bleeding  Gastrointestinal No history of Abdominal pain, Nausea, Vomiting, Diarrhea, BRBPR, Black tarry stools, GERD, Anorexia and Constipation  Endocrine No history of Polyuria and Polydyspia  Hem/Lymphatic No history of Easy bruising, Blood clots, Hx of blood transfusion, Anemia and Bleeding problems  Integumentary No history of Rashes and Skin changes  Physical  Examination  Constitutional 99%o2  Vitals Right Arm Sitting  / 64 mmHg, Pulse rate 71 bpm, Height in 5' 3\", BMI: 22.5, Weight in 127 lbs (or) 58 kgs and BSA : 1.61 cc/m²  General Appearance No Acute Distress and Well groomed  Head/Eyes/Ears/Nose/Mouth/Throat Sclera Clear and Mucous membranes Moist  Neck Normal carotid pulsations, No carotid bruits and No JVD  Respiratory Unlabored, Lungs clear with normal breath sounds and Equal bilaterally  Cardiovascular   Gait Normal gait  Upper Extremities No clubbing, No cyanosis and No edema  Skin Warm and dry  Neurologic / Psychiatric Alert and Oriented and Non-focal  Speech Normal speech  EKG/Other abnormalities  CV EXAM:  No JVP  Carotid pulses normal, no carotid bruits  Radial pulses normal  Irreg irreg, normal S1/S2, no murmur, rub, or gallop  No lower extremity edema   Allergies  1.Lisinopril(Reaction:, Severity:Mild)  2.losartan - Drug Class(Reaction:brain fog, dry mouth, leg cramps, Severity:Moderate)  3.Penicillins [Snomed:8176208](Reaction:Dyspnea [Snomed:728651909], Severity:Severe)  Medications  1.ELIQUIS 5MG TABLETS, TAKE 1 TABLET BY MOUTH TWICE DAILY  2.Lipitor 40 mg tablet, Take 1 tablet orally once a day.  3.losartan 50 mg tablet, Take 1 tablet orally once a day.  4.metoprolol succinate ER 25 mg tablet,extended release 24 hr, Take 1 tablet orally once a day.  5.Plavix 75 mg tablet, Take 1 tablet orally once a day.  Impression  1.Chronic systolic CHF (congestive heart failure), NYHA class 1  2.CAD (coronary artery disease)  3.Status post insertion of drug-eluting stent into left anterior descending (LAD) artery  4.Mitral valve regurgitation  5.Atrial fibrillation, chronic  6.Hypertension (HTN), primary  7.Hyperlipidemia, mixed  8.Long term current use of anticoagulant  Assessment & Plan  Coronary artery disease  -S/p cardiac cath and LAD PCI, 1/18/24  -Continue Plavix.  No aspirin since on Eliquis.  -Continue Toprol XL  -Phase 2 Cardiac Rehab.  Had to  delay enrollment due to hysterectomy recovery.    Chronic systolic CHF  -Echo 11/29/23: EF 40-45%  -Likely ischemic CM  -Now NYHA Class 1 status after LAD PCI  -Echo: EF still 40-45%  -Stop losartan  -Start Entresto 24-26 mg two times a day  -Return visit in 2-4 weeks to add spirinolactone    Atrial fibrillation, permanent  -Rate-controlled  -Continue Toprol-XL    Hypertension  -BP at goal  -Continue current meds    Hyperlipidemia  -Continue Lipitor 40 mg nightly  -Semiannual blood work to monitor     Venous insufficiency  -She sees [facility] for her venous insufficiency, had treatments 2/2022    Long-term use of anticoagulant  -No bleeding problems  -Continue Eliquis  -Semiannual blood work to monitor   Labs and Diagnostics ordered  1.EKG (electrocardiogram) (Today)  Future appointments  1.Referral Visit - Eleazar Morales ([email or web address]) : (Today)  Miscellaneous  1.Weight monitoring (regime/therapy)  Patient instructions  -Stop losartan  -Start Entresto 24-26 mg two times a day  -Return visit in 2-4 weeks to adjust medications further  Lab Details  COMP METABOLIC PANEL (14)  01/16/2024 11:06:16 AM  GLUCOSE 99 70-99 mg/dL  F  SODIUM 138 136-145 mmol/L  F  POTASSIUM 4.0 3.5-5.1 mmol/L  F  CHLORIDE 104  mmol/L  F  CO2 28.0 21.0-32.0 mmol/L  F  ANION GAP 6 0-18 mmol/L  F  BUN 15 9-23 mg/dL  F  CREATININE 0.80 0.55-1.02 mg/dL  F  BUN/ CREAT RATIO 18.8 10.0-20.0  F  CALCIUM 9.5 8.7-10.4 mg/dL  F  OSMOLALITY CALCULATED 287 275-295 mOsm/kg  F  E GFR CR 84 >=60 mL/min/1.73m2  F  ALT 15 10-49 U/L  F  AST 22 <=34 U/L  F  ALKALINE PHOSPHATASE 82  U/L  F  BILIRUBIN, TOTAL 1.3 0.2-1.1 mg/dL H F  TOTAL PROTEIN 7.6 5.7-8.2 g/dL  F  ALBUMIN 4.5 3.2-4.8 g/dL  F  GLOBULIN 3.1 2.8-4.4 g/dL  F  A/G RATIO 1.5 1.0-2.0  F  FASTING PATIENT CMP ANSWER Yes   F  CBC W/ DIFFERENTIAL  01/16/2024 10:29:53 AM  WBC 7.4 4.0-11.0 x10(3) uL  F  RED BLOOD COUNT 5.08 3.80-5.30 x10(6)uL  F  HGB 15.1 12.0-16.0  g/dL  F  HCT 47.9 35.0-48.0 %  F  MEAN CELL VOLUME 94.3 80.0-100.0 fL  F  MEAN CORPUSCULAR HEMOGLOBIN 29.7 26.0-34.0 pg  F  MEAN CORPUSCULAR HGB CONC 31.5 31.0-37.0 g/dL  F  RED CELL DISTRIBUTION WIDTH-SD 44.4 35.1-46.3 fL  F  RED CELL DISTRIBUTION WIDTH CV 12.8 11.0-15.0 %  F  PLATELETS 233.0 150.0-450.0 10(3)uL  F  NEUTROPHIL ABS PRELIM 3.70 1.50-7.70 x10 (3) uL  F  NEUTROPHIL ABSOLUTE 3.70 1.50-7.70 x10(3) uL  F  LYMPHOCYTE ABSOLUTE 3.02 1.00-4.00 x10(3) uL  F  MONOCYTE ABSOLUTE 0.45 0.10-1.00 x10(3) uL  F  EOSINOPHIL ABSOLUTE 0.16 0.00-0.70 x10(3) uL  F  BASOPHIL ABSOLUTE 0.02 0.00-0.20 x10(3) uL  F  IMMATURE GRANULOCYTE COUNT 0.01 0.00-1.00 x10(3) uL  F  NEUTROPHIL % 50.3 %  F  LYMPHOCYTE % 41.0 %  F  MONOCYTE % 6.1 %  F  EOSINOPHIL % 2.2 %  F  BASOPHIL % 0.3 %  F  IMMATURE GRANULOCYTE RATIO % 0.1 %  F  Diagnostics Details  Exercise Myocardial Perfusion Imaging 12/29/2023  1.Stress EKG is normal.    2.Maximal exercise treadmill test (MPHR : 96 %).    3.The functional capacity is good (9.8 METs).    4.Pt denied chest pain.    5.Occasional PVC's.    1.This is an abnormal perfusion study.    2.This scan is suggestive of low to moderate risk for future cardiovascular events.    3.The left ventricular cavity is noted to be normal on the stress study. The left ventricular ejection fraction was calculated to be 41% and left ventricular global function is mildly reduced.    4.The study quality is average.    5.Small size moderate intensity reversible antioapical defect.    Trans Thoracic Echocardiogram 11/29/2023  1.The left ventricle is normal in size. Mild left ventricular hypertrophy. Global left ventricular systolic function is mildly decreased. The left ventricular ejection fraction is 40-45%. Left ventricular diastolic function is indeterminate.    2.Mild aortic regurgitation.    3.Moderate mitral regurgitation.    4.Moderate tricuspid regurgitation.    5.The left atrial diameter is severely increased.    6.The  right atrium is moderately enlarged.    7.The estimated pulmonary artery systolic pressure is 46 mmHg assuming a right atrial pressure of 8 mmHg. Evidence of pulmonary hypertension is noted.    8.The study quality is good.    CPOE Orders carried out by: Radha Gibson RN  Care Providers: Nadia Borrero MD, Radha Gibson RN and Shaista TURPIN  Electronically Authenticated by  Nadia Borrero MD  05/21/2024 11:13:38 AM  Disclaimer: Components of this note were documented using voice recognition system and are subject to errors not corrected at proofreading. Contact the author of this note for any clarifications.

## 2024-06-30 NOTE — PLAN OF CARE
Problem: CARDIOVASCULAR - ADULT  Goal: Maintains optimal cardiac output and hemodynamic stability  Description: INTERVENTIONS:  - Monitor vital signs, rhythm, and trends  - Monitor for bleeding, hypotension and signs of decreased cardiac output  - Evaluate effectiveness of vasoactive medications to optimize hemodynamic stability  - Monitor arterial and/or venous puncture sites for bleeding and/or hematoma  - Assess quality of pulses, skin color and temperature  - Assess for signs of decreased coronary artery perfusion - ex. Angina  - Evaluate fluid balance, assess for edema, trend weights  6/30/2024 0429 by Georgette Mckeon RN  Outcome: Progressing  6/30/2024 0429 by Georgette Mckeon RN  Outcome: Progressing  Goal: Absence of cardiac arrhythmias or at baseline  Description: INTERVENTIONS:  - Continuous cardiac monitoring, monitor vital signs, obtain 12 lead EKG if indicated  - Evaluate effectiveness of antiarrhythmic and heart rate control medications as ordered  - Initiate emergency measures for life threatening arrhythmias  - Monitor electrolytes and administer replacement therapy as ordered  6/30/2024 0429 by Georgette Mckeon RN  Outcome: Progressing  6/30/2024 0429 by Georgette Mckeon RN  Outcome: Progressing

## 2024-06-30 NOTE — HISTORICAL OFFICE NOTE
[facility]  [address], [address] [place] [address]  [phone]      Kirstin Sanders  Progress Note  Demographics:  Name: Kirstin Sanders YOB: 1960  Age: 64, Female Medical Record No: [id number]  Visited Date/Time: 06/14/2024 10:00 AM    Chief Complaints  f/u  History of Present Illness  This is a patient of Dr. Borrero with a history of mitral valve regurgitation, permanent atrial fibrillation, hypertension, hyperlipidemia, coronary artery disease with LAD stent from January 2024, mild cardiomyopathy and heart failure.  At her last office visit losartan was discontinued in favor of Entresto for persistent ejection fraction 40 to 45%.  Patient was to come today to assess for the addition of aldosterone antagonist.  She reports that she has had an improvement in her shortness of breath with exertion which was mild however 2 to 3 days after starting the Entresto she started awakening every day with a left-sided headache that resolve by about 10 AM.    Patient denies chest pain, dyspnea, dyspnea on exertion, syncope, presyncope or edema.  Cardiac risk factors Never smoked  Past Medical History  1.Chronic systolic CHF (congestive heart failure), NYHA class 1  2.CAD (coronary artery disease)  3.Mitral valve regurgitation  4.Atrial fibrillation, chronic  5.Hypertension (HTN), primary  6.Hyperlipidemia, mixed  7.Osteopenia  8.Otosclerosis of left ear  9.Cancer of uterus  10.Long term current use of anticoagulant  Past Surgical History  1.Status post insertion of drug-eluting stent into left anterior descending (LAD) artery  2.History of hysterectomy - Hx  Family History  1. Father - Family history of heart disease - FHx  2. Mother - No history of Family history of heart disease - FHx  Social History  Smoking status Never smoked  Tobacco usage - No (Non-smoker for personal reasons (finding))  Review of systems  Cardiovascular No history of Chest pain, CLOUD, Palpitations, Syncope,  PND, Orthopnea, Edema and Claudication  Respiratory No history of SOB  Neurological No history of Numbness and Limb weakness  Other - Review of System  headache  Physical Examination  Constitutional o2sat 100%  Vitals Right Arm Sitting  / 72 mmHg, Pulse rate 79 bpm, Regular, Height in 5' 3\", BMI: 22.7, Weight in 128 lbs (or) 58 kgs and BSA : 1.61 cc/m²  General Appearance No Acute Distress  Respiratory Lungs clear with normal breath sounds  Cardiovascular Regular rhythm. Normal rate present. Normal and normal S1 and S2    Upper Extremities No edema  Lower Extremities No edema  Allergies  1.Hydrochlorothiazide(Reaction:SHORTNESS OF BREATH, Severity:Mild)  2.Lisinopril(Reaction:, Severity:Mild)  3.Penicillins [Snomed:[id number]](Reaction:Dyspnea [Snomed:[id number]], Severity:Severe)  Medications (Info obtained by: Verbal)  1.atorvastatin 40 MG Oral Tab, Take 1 tablet (40 mg total) by mouth nightly.  2.ELIQUIS 5MG TABLETS, TAKE 1 TABLET BY MOUTH TWICE DAILY  3.Entresto 24 mg-26 mg tablet, Take 1 tablet orally 2 times a day.  4.HYDROcodone-acetaminophen 5-325 MG Oral Tab, Take 1 tablet by mouth every 6 (six) hours as needed for Pain.  5.metoprolol succinate ER 25 mg tablet,extended release 24 hr, Take 1 tablet orally once a day.  6.Plavix 75 mg tablet, Take 1 tablet orally once a day.  Impression  1.Cardiomyopathy, dilated - CMO  2.Chronic systolic CHF (congestive heart failure), NYHA class 1  3.CAD (coronary artery disease)  4.Status post insertion of drug-eluting stent into left anterior descending (LAD) artery  5.Atrial fibrillation, chronic  6.Hypertension (HTN), primary  7.Long term current use of anticoagulant  Assessment & Plan  1.  Cardiomyopathy-mild with ejection fraction 40 to 45%.  It sounds like the patient had a clinical response to the Entresto but there is concerned about side effects of headache.  She will hold the Entresto for 3 days to see if her headaches resolved.  If they do not resolve I would  like her to resume the Entresto and contact her primary care physician about assessment of the headache.  If headaches resolve would likely start spironolactone at that time.  Patient was reluctant to take Entresto and spironolactone feeling that it was \"too much medicine\".  The purpose of the medications were reviewed with her today and she verbalized understanding.  Patient has not had a BMP since April and she will obtain that today to assess renal function and electrolytes.  2.  Heart failure reduced EF-euvolemic on exam, follows low-sodium and denies any edema.  3.  CAD-no symptoms of chest pain or pressure, continue clopidogrel, no aspirin as she is on Eliquis.  4.  Atrial fibrillation-permanent and rate controlled with metoprolol succinate.  Denies bleeding problems on Eliquis.    Plan:  Stop Entresto for 3 days to see if your headaches go away  If headaches do not go away restart Entresto  Let me know about the headaches either way  BMP today  Printed prescription for Entresto 24/26 mg BID  Medications Ordered  1.Entresto 24 mg-26 mg tablet, Take 1 tablet orally 2 times a day.  Labs and Diagnostics ordered  1.BMP (Basic Metabolic Panel) (Today)  Future appointments  1.Referral Visit - Eleazar Morales ([email or web address]) : (Today)  Miscellaneous  1.Weight monitoring (regime/therapy)  2.Reviewed trans thoracic echocardiogram with the patient.  Nurses documentation  refills: None   Assistive devices: none  Upcoming sx or procedures: none   Patient instructions  Plan:  Stop Entresto for 3 days to see if your headaches go away  If headaches do not go away restart Entresto  Let me know about the headaches either way  BMP today  Printed prescription for Entresto 24/26 mg BID    Lab Details  COMP METABOLIC PANEL (14)  01/16/2024 11:06:16 AM  GLUCOSE 99 70-99 mg/dL  F  SODIUM 138 136-145 mmol/L  F  POTASSIUM 4.0 3.5-5.1 mmol/L  F  CHLORIDE 104  mmol/L  F  CO2 28.0 21.0-32.0 mmol/L  F  ANION  GAP 6 0-18 mmol/L  F  BUN 15 9-23 mg/dL  F  CREATININE 0.80 0.55-1.02 mg/dL  F  BUN/ CREAT RATIO 18.8 10.0-20.0  F  CALCIUM 9.5 8.7-10.4 mg/dL  F  OSMOLALITY CALCULATED 287 275-295 mOsm/kg  F  E GFR CR 84 >=60 mL/min/1.73m2  F  ALT 15 10-49 U/L  F  AST 22 <=34 U/L  F  ALKALINE PHOSPHATASE 82  U/L  F  BILIRUBIN, TOTAL 1.3 0.2-1.1 mg/dL H F  TOTAL PROTEIN 7.6 5.7-8.2 g/dL  F  ALBUMIN 4.5 3.2-4.8 g/dL  F  GLOBULIN 3.1 2.8-4.4 g/dL  F  A/G RATIO 1.5 1.0-2.0  F  FASTING PATIENT CMP ANSWER Yes   F  CBC W/ DIFFERENTIAL  01/16/2024 10:29:53 AM  WBC 7.4 4.0-11.0 x10(3) uL  F  RED BLOOD COUNT 5.08 3.80-5.30 x10(6)uL  F  HGB 15.1 12.0-16.0 g/dL  F  HCT 47.9 35.0-48.0 %  F  MEAN CELL VOLUME 94.3 80.0-100.0 fL  F  MEAN CORPUSCULAR HEMOGLOBIN 29.7 26.0-34.0 pg  F  MEAN CORPUSCULAR HGB CONC 31.5 31.0-37.0 g/dL  F  RED CELL DISTRIBUTION WIDTH-SD 44.4 35.1-46.3 fL  F  RED CELL DISTRIBUTION WIDTH CV 12.8 11.0-15.0 %  F  PLATELETS 233.0 150.0-450.0 10(3)uL  F  NEUTROPHIL ABS PRELIM 3.70 1.50-7.70 x10 (3) uL  F  NEUTROPHIL ABSOLUTE 3.70 1.50-7.70 x10(3) uL  F  LYMPHOCYTE ABSOLUTE 3.02 1.00-4.00 x10(3) uL  F  MONOCYTE ABSOLUTE 0.45 0.10-1.00 x10(3) uL  F  EOSINOPHIL ABSOLUTE 0.16 0.00-0.70 x10(3) uL  F  BASOPHIL ABSOLUTE 0.02 0.00-0.20 x10(3) uL  F  IMMATURE GRANULOCYTE COUNT 0.01 0.00-1.00 x10(3) uL  F  NEUTROPHIL % 50.3 %  F  LYMPHOCYTE % 41.0 %  F  MONOCYTE % 6.1 %  F  EOSINOPHIL % 2.2 %  F  BASOPHIL % 0.3 %  F  IMMATURE GRANULOCYTE RATIO % 0.1 %  F  Diagnostics Details  Trans Thoracic Echocardiogram 05/21/2024  1.The left ventricle is normal in size. Global left ventricular systolic function is mildly decreased. The left ventricular ejection fraction is 42%. Regional wall motion analysis shows hypokinesis of basal inferoseptal segment and mid inferoseptal segment. Wall motion score index is 2. Left ventricular diastolic function is indeterminate. There is mild concentric left ventricular hypertrophy.    2.The right ventricle  is normal in size. Right ventricular systolic function is normal.    3.The left atrium is severely enlarged based on the left atrium volume index of 68.1ml/m².    4.The right atrium is moderately enlarged.    5.Moderate (2+) mitral regurgitation.    6.Mild (1+) aortic regurgitation.    7.Moderate (2+) tricuspid regurgitation.    8.A small pericardial effusion is noted. It is an anterior pericardial effusion. The pericardial effusion thickness is 0.88 cm.    9.The pulmonary artery systolic pressure is 43 mmHg.    Exercise Myocardial Perfusion Imaging 12/29/2023  1.Stress EKG is normal.    2.Maximal exercise treadmill test (MPHR : 96 %).    3.The functional capacity is good (9.8 METs).    4.Pt denied chest pain.    5.Occasional PVC's.    1.This is an abnormal perfusion study.    2.This scan is suggestive of low to moderate risk for future cardiovascular events.    3.The left ventricular cavity is noted to be normal on the stress study. The left ventricular ejection fraction was calculated to be 41% and left ventricular global function is mildly reduced.    4.The study quality is average.    5.Small size moderate intensity reversible antioapical defect.    CPOE Orders carried out by: Eladia Fischer  Care Providers: Arabella WATSON, Leonarda LOERA, Shaitsa [facility] and Eladia Fischer  Electronically Authenticated by  Arabella WATSON  06/14/2024 10:32:01 AM  Disclaimer: Components of this note were documented using voice recognition system and are subject to errors not corrected at proofreading. Contact the author of this note for any clarifications.

## 2024-06-30 NOTE — PLAN OF CARE
No cardiac c/o, Plan is diuresis and stress test in am   Problem: Patient Centered Care  Goal: Patient preferences are identified and integrated in the patient's plan of care  Description: Interventions:  - What would you like us to know as we care for you? From home  - Provide timely, complete, and accurate information to patient/family  - Incorporate patient and family knowledge, values, beliefs, and cultural backgrounds into the planning and delivery of care  - Encourage patient/family to participate in care and decision-making at the level they choose  - Honor patient and family perspectives and choices  Outcome: Progressing     Problem: CARDIOVASCULAR - ADULT  Goal: Maintains optimal cardiac output and hemodynamic stability  Description: INTERVENTIONS:  - Monitor vital signs, rhythm, and trends  - Monitor for bleeding, hypotension and signs of decreased cardiac output  - Evaluate effectiveness of vasoactive medications to optimize hemodynamic stability  - Monitor arterial and/or venous puncture sites for bleeding and/or hematoma  - Assess quality of pulses, skin color and temperature  - Assess for signs of decreased coronary artery perfusion - ex. Angina  - Evaluate fluid balance, assess for edema, trend weights  Outcome: Progressing  Goal: Absence of cardiac arrhythmias or at baseline  Description: INTERVENTIONS:  - Continuous cardiac monitoring, monitor vital signs, obtain 12 lead EKG if indicated  - Evaluate effectiveness of antiarrhythmic and heart rate control medications as ordered  - Initiate emergency measures for life threatening arrhythmias  - Monitor electrolytes and administer replacement therapy as ordered  Outcome: Progressing

## 2024-06-30 NOTE — PROGRESS NOTES
[facility]  part of [facility]     Hospitalist Progress Note     Kirstin Sanders Patient Status:  Inpatient    3/6/1960 MRN [id number]   Location [facility] 3W/SW Attending Apolinar Cartagena MD   Hosp Day # 1 PCP Eleazar Morales MD     Chief Complaint:   Chief Complaint   Patient presents with    Difficulty Breathing    Chest Pain Angina        Subjective:     Patient seen lying in bed.  No family at bedside.  Patient denies acute events overnight.  Patient states shortness of breath much improved today.  Able to lay flat without issues.  Patient reports frequent urination overnight.  Patient denies current chest pain.  Patient otherwise denies abdominal pain, nausea vomit, fevers or chills.    Objective:      Vital signs:  Vitals:    24 0100 24 0413 24 0557 24 0811   BP:   128/76 128/81   BP Location:   Right arm Right arm   Pulse: 80  85 83   Resp:   16 16   Temp:   97.7 °F (36.5 °C) 97.6 °F (36.4 °C)   TempSrc:   Oral Oral   SpO2:   97% 96%   Weight:  130 lb (59 kg)     Height:           Intake/Output Summary (Last 24 hours) at 2024 1127  Last data filed at 2024 1100  Gross per 24 hour   Intake 220 ml   Output 1100 ml   Net -880 ml           Physical Exam:    GENERAL:  Awake and alert, in no acute distress.  HEART: Irregular rhythm.  Regular rate   LUNGS:  Air entry was minimally decreased.  No increased work of breathing or wheezes   ABDOMEN: Soft and non-tender.   EXTREMITIES: No edema appreciated  PSYCHIATRIC: Normal mood    Diagnostic Data:    Labs:    Recent Labs   Lab 24  1539 24  0920   WBC 10.0 8.3   HGB 13.4 15.3   MCV 91.5 93.8   .0 272.0       Recent Labs   Lab 24  1539 24  0044 24  0920   *  --  112*   BUN 13  --  10   CREATSERUM 0.93  --  0.83   CA 9.3  --  10.1   ALB  --   --  4.9*     --  141   K 3.6 4.0 4.5     --  104   CO2 28.0  --  28.0   ALKPHO  --   --  113   AST   --   --  27   ALT  --   --  30   BILT  --   --  2.5*   TP  --   --  8.0           Estimated Creatinine Clearance: 58.1 mL/min (based on SCr of 0.83 mg/dL).    No results for input(s): \"PTP\", \"INR\" in the last 168 hours.         COVID-19  No results found for: \"COVID19\"    Pro-Calcitonin  No results for input(s): \"PCT\" in the last 168 hours.    Cardiac  No results for input(s): \"TROP\", \"PBNP\" in the last 168 hours.    Inflammatory Markers  No results for input(s): \"CRP\", \"MYLENE\", \"LDH\", \"DDIMER\" in the last 168 hours.    Culture:  No results found for this visit on 06/29/24.    XR CHEST AP PORTABLE  (CPT=71045)    Result Date: 6/29/2024  CONCLUSION: Cardiomegaly.  Mild pulmonary edema.  Correlate for CHF.   Dictated by (CST): Genaro Mejia MD on 6/29/2024 at 3:54 PM     Finalized by (CST): Genaro Mejia MD on 6/29/2024 at 3:55 PM           EKG 12 Lead    Result Date: 6/30/2024  Atrial fibrillation Incomplete right bundle branch block Left anterior fascicular block Nonspecific T wave abnormality Abnormal ECG No previous ECGs found in [facility]     Medications:    atorvastatin  40 mg Oral Nightly    clopidogrel  75 mg Oral Daily    apixaban  5 mg Oral BID    losartan  50 mg Oral Nightly    metoprolol succinate ER  25 mg Oral Nightly    furosemide  40 mg Intravenous BID (Diuretic)       Assessment & Plan:        Acute on chronic heart failure with reduced ejection fraction   -Patient presenting with progressive shortness of breath  -Chest x-ray reviewed, noted mild pulmonary edema  -BNP noted to be elevated on admission  -Last EF noted to be 40 to 45%  -Continue diuresis with Lasix 40mg IV BID  -Follow-up echo  - Strict I&Os, Daily weights, Fluid restriction  - Cardiology on consult, appreciate further recs.      Troponin elevation  -Patient presenting with progressive shortness of breath and mild chest pain  -Likely secondary to acute CHF exacerbation as above  -Troponins with flat trend.  -Without further chest pain  -Likely  type II MI secondary to demand ischemia from CHF exacerbation as above  -Continue dual antiplatelets and statin  -Cardiology on consult, appreciate further recommendations     Permanent atrial fibrillation  -Rates currently at goal, continue metoprolol  -Patient on chronic anticoagulation with Eliquis, will continue  -Telemetry monitoring  -Cardiology consulted, appreciate further recommendations     History of CAD  -Status post PCI in January 2024  -Continue antiplatelet therapy  -Continue statin     HTN  - controlled  - CPM  - Monitor and adjust as needed      Plan of care discussed with patient at bedside . Discussed management/test result(s) with Rn and cardiology consultant    Quality:  DVT Prophylaxis: Eliquis  CODE status: Full  Estimated date of discharge: TBD  Discharge is dependent on: clinical stability    Apolinar Cartagena MD          This note was prepared using Dragon Medical voice recognition dictation software. As a result errors may occur. When identified these errors have been corrected. While every attempt is made to correct errors during dictation discrepancies may still exist

## 2024-06-30 NOTE — CONSULTS
[facility]                                                            CONSULT NOTE      Patient Name: Kirstin Sanders MRN: [id number]   : 3/6/1960 CSN: [id number]       Reason for consultation:   Asked by Apolinar Cartagena MD to provide evaluation and management of dyspnea.    Assessment and Recommendations:     Acute on chronic congestive heart failure, unspecified heart failure type (HCC)  -Acute exacerbation may be due to dietary changes, med changes, or new ischemic substrate  -LVEF has decreased significantly, 25%  -Had headaches with Entresto, but she is open to trying it again  -Start Entresto 24-26 mg BID  -Stop losartan  -Continue Toprol XL 25 BID  -Continue Lasix 40 mg IV BID  -Monitor I/Os, weights, creat, lytes    Elevated troponin  Coronary artery disease  -S/p LAD PCI with bare-metal stent  -Worsening of LV function, need to reassess for ischemia  -Tomorrow, exercise nuclear stress test  -Based on results, may need repeat left heart cath  -Continue plavix    Atrial fibrillation, permanent  -Rate-controlled  -Continue Toprol XL  -Continue Eliquis      Thank you for the consultation.    Nadia Borrero MD  Cardiac EP  [facility]  2024  C5      History of present illness:   The patient is a 64 year old with CAD s/p PCI BMS, ischemic CM, permanent AF, who has had 1 week of progressively worsening dyspnea.  She developed orthopnea a few days ago, as well.  She had no edema.  She feels a chest gurgling after taking losartan.  She had headaches after 2 days of Entresto in the past.  She was tolerating spironolactone.  She has no lightheadedness or syncope.    ROS:   Constitutional: No fevers, chills, fatigue or night sweats.  ENT: No mouth pain, neck pain, running nose, headaches or swollen glands.  Skin: No rashes, pruritus or skin changes,  Respiratory: No cough, wheezing, +  shortness of breath.  CV: No chest pain or claudication.  Musculoskeletal: No joint pain, stiffness or swelling.  : No dysuria or hematuria.  GI: No nausea, vomiting or diarrhea. No blood in stools.  Neurologic: No seizures, tremors, weakness or numbness.    Past Medical, Surgical, Family, and Social Histories:     Past Medical History:    A-fib (HCC)    Cardiomyopathy (HCC)    EF 45%    Heart failure (HCC)    High blood pressure    High cholesterol    Shoulder fracture, left    Surgical repair; ok since     Visual impairment    contacts     Past Surgical History:   Procedure Laterality Date    Bunionectomy Bilateral     Cath bare metal stent (bms)      Colonoscopy N/A 02/26/2024    with polypectomy, clip x 3    Colonoscopy N/A 02/26/2024    Procedure: COLONOSCOPY with polypectomy, clip x 3;  Surgeon: Aashish Jeff MD;  Location: [facility] ENDOSCOPY    Other surgical history Right     shoulder and humerous fractures repaired    Other surgical history Bilateral     ear pinning    Other surgical history Left     Ear stapectomy,failed    Pt w/ coronary artery stent  01/18/2024     Family History   Problem Relation Age of Onset    Heart Disorder Father         Heart valve disease    Cancer Mother 61        ovarian    Ovarian Cancer Mother 61    Breast Cancer Paternal Aunt         50s       SHX:  The patient reports that she has never smoked. She has never used smokeless tobacco. She reports current alcohol use of about 2.0 standard drinks of alcohol per week. She reports that she does not use drugs.    Allergies:     Allergies   Allergen Reactions    Hctz [Hydrochlorothiazide] SHORTNESS OF BREATH    Lisinopril SHORTNESS OF BREATH    Pcn [Penicillins] UNKNOWN and SHORTNESS OF BREATH     DYSPNEA  Since infancy  Couldn't breathe. Patient is unsure if any skin blistering or organ involvement       Medications:     Current Outpatient Medications   Medication Instructions    atorvastatin (LIPITOR) 40 mg, Oral, Nightly     clopidogrel (PLAVIX) 75 mg, Oral, Daily    docusate sodium (COLACE) 100 mg, Oral, 2 times daily PRN    ELIQUIS 5 MG Oral Tab TK 1 T PO  BID    HYDROcodone-acetaminophen 5-325 MG Oral Tab 1 tablet, Oral, Every 6 hours PRN    ibuprofen (MOTRIN) 600 mg, Oral, Every 8 hours PRN    losartan (COZAAR) 50 mg, Oral, Nightly    metoprolol succinate ER (TOPROL XL) 25 mg, Oral, Nightly    spironolactone (ALDACTONE) 12.5 mg, Oral, Daily     PHYSICAL EXAM:   Blood pressure 112/79, pulse 93, temperature 97.6 °F (36.4 °C), temperature source Oral, resp. rate 16, height 160 cm (5' 3\"), weight 130 lb (59 kg), SpO2 94%.  GEN - no distress  HEENT - normal conjunctiva, moist mucosa  Neck - supple, no LAD  Lungs - nonlabored, clear bilaterally  Heart - JVP 14 cm H2O, carotids normal; irreg irreg, nl S1/S2; no edema  Abd - soft, nontender  Ext - arthritic changes  Neuro - A+Ox3, no facial droop or gross deficits  Psych - cooperative, calm    LABS AND DATA:     ECG: AF 95, iRBBB, LAFB    Lab Results   Component Value Date    WBC 8.3 06/30/2024    HGB 15.3 06/30/2024    HCT 46.6 06/30/2024    .0 06/30/2024    CREATSERUM 0.83 06/30/2024    BUN 10 06/30/2024     06/30/2024    K 4.5 06/30/2024     06/30/2024    CO2 28.0 06/30/2024     (H) 06/30/2024    CA 10.1 06/30/2024    ALB 4.9 (H) 06/30/2024    ALKPHO 113 06/30/2024    BILT 2.5 (H) 06/30/2024    TP 8.0 06/30/2024    AST 27 06/30/2024    ALT 30 06/30/2024    TSH 2.430 07/13/2023    ESRML 7 12/22/2023    CRP <0.40 12/22/2023    MG 1.9 06/30/2024    PHOS 3.4 04/14/2024    CK 82 02/22/2022    B12 >2,000 (H) 07/13/2023       Imaging: I independently visualized all relevant chest imaging in PACS, agree with radiology interpretation except where noted.    XR CHEST AP PORTABLE  (CPT=71045)    Result Date: 6/29/2024  CONCLUSION: Cardiomegaly.  Mild pulmonary edema.  Correlate for CHF.   Dictated by (CST): Genaro Mejia MD on 6/29/2024 at 3:54 PM     Finalized by (CST):  Genaro Mejia MD on 6/29/2024 at 3:55 PM             ------------------------------------------------------------------------------------------------------------------------------

## 2024-06-30 NOTE — PHYSICAL THERAPY NOTE
PHYSICAL THERAPY EVALUATION - INPATIENT     Room Number: 322/322-A  Evaluation Date: 2024  Type of Evaluation: Initial   Physician Order: PT Eval and Treat    Presenting Problem: CHF     Reason for Therapy: Mobility Dysfunction and Discharge Planning    PHYSICAL THERAPY ASSESSMENT   Patient is a 64 year old female admitted 2024 for chest pain SOB.  Prior to admission, patient's baseline is independent works outside home.  Patient is currently functioning at baseline with bed mobility, transfers, and gait.  Patient is requiring independent as a result of the following impairments: decreased endurance/aerobic capacity.      Patient will benefit from continued skilled PT Services For duration of hospitalization, however, given the patient is functioning near baseline level do not anticipate skilled therapy needs at discharge .    PLAN  Patient has been evaluated and presents with no skilled Physical Therapy needs at this time.  Patient will be discharged from Physical Therapy services. Please re-order if a new functional limitation presents during this admission.    PHYSICAL THERAPY MEDICAL/SOCIAL HISTORY   History related to current admission:      Problem List  Principal Problem:    Acute on chronic congestive heart failure, unspecified heart failure type (HCC)  Active Problems:    Elevated troponin    Acute CHF (congestive heart failure) ([facility])      HOME SITUATION  Home Situation  Type of Home: House  Home Layout: Two level  Lives With: Spouse  Drives: Yes     Prior Level of [place]: ind    SUBJECTIVE  \"My SOB went away with lasix\"    PHYSICAL THERAPY EXAMINATION   OBJECTIVE          WEIGHT BEARING RESTRICTION                   PAIN ASSESSMENT  Ratin        COGNITION  Overall Cognitive Status:  WFL - within functional limits    RANGE OF MOTION AND STRENGTH ASSESSMENT  Upper extremity ROM and strength are within functional limits   Lower extremity ROM is within functional limits   Lower extremity  strength is within functional limits     BALANCE  Static Sitting: Fair  Dynamic Sitting: Fair  Static Standing: Fair  Dynamic Standing: Fair    ADDITIONAL TESTS                                    NEUROLOGICAL FINDINGS                      ACTIVITY TOLERANCE                         O2 WALK  Oxygen Therapy  SPO2% Ambulation on Oxygen: 96    AM-PAC '6-Clicks' INPATIENT SHORT FORM - BASIC MOBILITY  How much difficulty does the patient currently have...  Patient Difficulty: Turning over in bed (including adjusting bedclothes, sheets and blankets)?: None   Patient Difficulty: Sitting down on and standing up from a chair with arms (e.g., wheelchair, bedside commode, etc.): None   Patient Difficulty: Moving from lying on back to sitting on the side of the bed?: None   How much help from another person does the patient currently need...   Help from Another: Moving to and from a bed to a chair (including a wheelchair)?: None   Help from Another: Need to walk in hospital room?: None   Help from Another: Climbing 3-5 steps with a railing?: None     AM-PAC Score:  Raw Score: 24   Approx Degree of Impairment: 0%   Standardized Score (AM-PAC Scale): 61.14   CMS Modifier (G-Code): CH    FUNCTIONAL ABILITY STATUS  Functional Mobility/Gait Assessment  Gait Assistance: Independent  Distance (ft): 400  Rolling: independent  Supine to Sit: independent  Sit to Supine: independent  Sit to Stand: independent    Exercise/Education Provided:  Ice provided for left ankle    The patient's Approx Degree of Impairment: 0% has been calculated based on documentation in the [facility] '6 clicks' Inpatient Basic Mobility Short Form.  Research supports that patients with this level of impairment may benefit from home.  Final disposition will be made by interdisciplinary medical team.    Patient End of Session: Up in chair    Patient was able to achieve the following ...   Patient able to transfer  At previous, functional level    Patient able to ambulate  on level surfaces  At previous, functional level     Patient Evaluation Complexity Level:  History Low - no personal factors and/or co-morbidities   Examination of body systems Low -  addressing 1-2 elements   Clinical Presentation Low- Stable   Clinical Decision Making  Low Complexity     Gait Training: 10 minutes

## 2024-07-01 ENCOUNTER — APPOINTMENT (OUTPATIENT)
Dept: NUCLEAR MEDICINE | Facility: HOSPITAL | Age: 64
End: 2024-07-01
Attending: INTERNAL MEDICINE
Payer: COMMERCIAL

## 2024-07-01 ENCOUNTER — APPOINTMENT (OUTPATIENT)
Dept: CV DIAGNOSTICS | Facility: HOSPITAL | Age: 64
End: 2024-07-01
Attending: INTERNAL MEDICINE
Payer: COMMERCIAL

## 2024-07-01 VITALS
RESPIRATION RATE: 18 BRPM | DIASTOLIC BLOOD PRESSURE: 93 MMHG | TEMPERATURE: 97 F | HEIGHT: 63 IN | OXYGEN SATURATION: 97 % | WEIGHT: 126 LBS | BODY MASS INDEX: 22.32 KG/M2 | SYSTOLIC BLOOD PRESSURE: 118 MMHG | HEART RATE: 120 BPM

## 2024-07-01 LAB
ANION GAP SERPL CALC-SCNC: 8 MMOL/L (ref 0–18)
BASOPHILS # BLD AUTO: 0.05 X10(3) UL (ref 0–0.2)
BASOPHILS NFR BLD AUTO: 0.7 %
BUN BLD-MCNC: 17 MG/DL (ref 9–23)
BUN/CREAT SERPL: 21.8 (ref 10–20)
CALCIUM BLD-MCNC: 9.7 MG/DL (ref 8.7–10.4)
CHLORIDE SERPL-SCNC: 103 MMOL/L (ref 98–112)
CO2 SERPL-SCNC: 26 MMOL/L (ref 21–32)
CREAT BLD-MCNC: 0.78 MG/DL
DEPRECATED RDW RBC AUTO: 46.6 FL (ref 35.1–46.3)
EGFRCR SERPLBLD CKD-EPI 2021: 86 ML/MIN/1.73M2 (ref 60–?)
EOSINOPHIL # BLD AUTO: 0.12 X10(3) UL (ref 0–0.7)
EOSINOPHIL NFR BLD AUTO: 1.7 %
ERYTHROCYTE [DISTWIDTH] IN BLOOD BY AUTOMATED COUNT: 13 % (ref 11–15)
GLUCOSE BLD-MCNC: 148 MG/DL (ref 70–99)
HCT VFR BLD AUTO: 53.5 %
HGB BLD-MCNC: 16.6 G/DL
IMM GRANULOCYTES # BLD AUTO: 0.01 X10(3) UL (ref 0–1)
IMM GRANULOCYTES NFR BLD: 0.1 %
LYMPHOCYTES # BLD AUTO: 3.32 X10(3) UL (ref 1–4)
LYMPHOCYTES NFR BLD AUTO: 47.5 %
MCH RBC QN AUTO: 30.1 PG (ref 26–34)
MCHC RBC AUTO-ENTMCNC: 31 G/DL (ref 31–37)
MCV RBC AUTO: 97.1 FL
MONOCYTES # BLD AUTO: 0.41 X10(3) UL (ref 0.1–1)
MONOCYTES NFR BLD AUTO: 5.9 %
NEUTROPHILS # BLD AUTO: 3.08 X10 (3) UL (ref 1.5–7.7)
NEUTROPHILS # BLD AUTO: 3.08 X10(3) UL (ref 1.5–7.7)
NEUTROPHILS NFR BLD AUTO: 44.1 %
OSMOLALITY SERPL CALC.SUM OF ELEC: 288 MOSM/KG (ref 275–295)
PLATELET # BLD AUTO: 281 10(3)UL (ref 150–450)
PLATELETS.RETICULATED NFR BLD AUTO: 1.4 % (ref 0–7)
POTASSIUM SERPL-SCNC: 4.2 MMOL/L (ref 3.5–5.1)
RBC # BLD AUTO: 5.51 X10(6)UL
SODIUM SERPL-SCNC: 137 MMOL/L (ref 136–145)
WBC # BLD AUTO: 7 X10(3) UL (ref 4–11)

## 2024-07-01 PROCEDURE — 93017 CV STRESS TEST TRACING ONLY: CPT | Performed by: INTERNAL MEDICINE

## 2024-07-01 PROCEDURE — 78452 HT MUSCLE IMAGE SPECT MULT: CPT | Performed by: INTERNAL MEDICINE

## 2024-07-01 PROCEDURE — 99239 HOSP IP/OBS DSCHRG MGMT >30: CPT | Performed by: INTERNAL MEDICINE

## 2024-07-01 RX ORDER — METOPROLOL SUCCINATE 50 MG/1
50 TABLET, EXTENDED RELEASE ORAL NIGHTLY
Status: DISCONTINUED | OUTPATIENT
Start: 2024-07-01 | End: 2024-07-01

## 2024-07-01 RX ORDER — MAGNESIUM OXIDE 400 MG/1
400 TABLET ORAL ONCE
Status: COMPLETED | OUTPATIENT
Start: 2024-07-01 | End: 2024-07-01

## 2024-07-01 RX ORDER — METOPROLOL SUCCINATE 50 MG/1
50 TABLET, EXTENDED RELEASE ORAL NIGHTLY
Qty: 30 TABLET | Refills: 3 | Status: SHIPPED | OUTPATIENT
Start: 2024-07-01

## 2024-07-01 RX ORDER — SPIRONOLACTONE 25 MG/1
12.5 TABLET ORAL DAILY
Status: DISCONTINUED | OUTPATIENT
Start: 2024-07-02 | End: 2024-07-01

## 2024-07-01 RX ORDER — SPIRONOLACTONE 25 MG/1
12.5 TABLET ORAL DAILY
Status: DISCONTINUED | OUTPATIENT
Start: 2024-07-01 | End: 2024-07-01

## 2024-07-01 NOTE — PLAN OF CARE
Plan is for stress test today at 7am. Consent signed in chart, tachy up to 150-160s when ambulating but comes back down to 80-90s. Pt having bilateral leg cramping, Suzan made aware, labs drawn with mag replaced. IV lasix BID continued. Call light within reach, fall precautions in place.    Problem: Patient Centered Care  Goal: Patient preferences are identified and integrated in the patient's plan of care  Description: Interventions:  - What would you like us to know as we care for you?   - Provide timely, complete, and accurate information to patient/family  - Incorporate patient and family knowledge, values, beliefs, and cultural backgrounds into the planning and delivery of care  - Encourage patient/family to participate in care and decision-making at the level they choose  - Honor patient and family perspectives and choices  Outcome: Progressing   Problem: Patient/Family Goals  Goal: Patient/Family Long Term Goal  Description: Patient's Long Term Goal:   Interventions:  -  See additional Care Plan goals for specific interventions  Outcome: Progressing  Goal: Patient/Family Short Term Goal  Description: Patient's Short Term Goal:   Interventions:   - - See additional Care Plan goals for specific interventions  Outcome: Progressing     Problem: CARDIOVASCULAR - ADULT  Goal: Maintains optimal cardiac output and hemodynamic stability  Description: INTERVENTIONS:  - Monitor vital signs, rhythm, and trends  - Monitor for bleeding, hypotension and signs of decreased cardiac output  - Evaluate effectiveness of vasoactive medications to optimize hemodynamic stability  - Monitor arterial and/or venous puncture sites for bleeding and/or hematoma  - Assess quality of pulses, skin color and temperature  - Assess for signs of decreased coronary artery perfusion - ex. Angina  - Evaluate fluid balance, assess for edema, trend weights  Outcome: Progressing  Goal: Absence of cardiac arrhythmias or at baseline  Description:  INTERVENTIONS:  - Continuous cardiac monitoring, monitor vital signs, obtain 12 lead EKG if indicated  - Evaluate effectiveness of antiarrhythmic and heart rate control medications as ordered  - Initiate emergency measures for life threatening arrhythmias  - Monitor electrolytes and administer replacement therapy as ordered  Outcome: Progressing     Problem: SAFETY ADULT - FALL  Goal: Free from fall injury  Description: INTERVENTIONS:  - Assess pt frequently for physical needs  - Identify cognitive and physical deficits and behaviors that affect risk of falls.  - [place] fall precautions as indicated by assessment.  - Educate pt/family on patient safety including physical limitations  - Instruct pt to call for assistance with activity based on assessment  - Modify environment to reduce risk of injury  - Provide assistive devices as appropriate  - Consider OT/PT consult to assist with strengthening/mobility  - Encourage toileting schedule  Outcome: Progressing     Problem: DISCHARGE PLANNING  Goal: Discharge to home or other facility with appropriate resources  Description: INTERVENTIONS:  - Identify barriers to discharge w/pt and caregiver  - Include patient/family/discharge partner in discharge planning  - Arrange for needed discharge resources and transportation as appropriate  - Identify discharge learning needs (meds, wound care, etc)  - Arrange for interpreters to assist at discharge as needed  - Consider post-discharge preferences of patient/family/discharge partner  - Complete POLST form as appropriate  - Assess patient's ability to be responsible for managing their own health  - Refer to Case Management Department for coordinating discharge planning if the patient needs post-hospital services based on physician/LIP order or complex needs related to functional status, cognitive ability or social support system  Outcome: Progressing

## 2024-07-01 NOTE — PAYOR COMM NOTE
--------------  ADMISSION REVIEW     Payor: FRED PHAN POS/MCNP  Subscriber #:  [id number]  Authorization Number: [id number]    Admit date: 6/29/24  Admit time:  6:43 PM       REVIEW DOCUMENTATION:     ED Provider Notes        HPI    64-year-old female presents ER with complaint of worsening shortness of breath for the past 2 days as well as chest pain.  Patient with a past medical history of A-fib, CHF, hypertension.  Patient currently on Plavix as well as Eliquis.  Patient states that she has been having orthopnea of worsening the last few days as well as dyspnea on exertion.  Patient states that her chest pain is also continued with 2 out of 10 chest pain.      Physical Exam     ED Triage Vitals [06/29/24 1457]   /90   Pulse 103   Resp 20   Temp 98 °F (36.7 °C)   Temp src Oral   SpO2 99 %   O2 Device None (Room air)       Physical Exam  Vitals and nursing note reviewed.   Constitutional:       Appearance: She is well-developed.   HENT:      Head: Normocephalic and atraumatic.      Right Ear: External ear normal.      Left Ear: External ear normal.      Nose: Nose normal.   Eyes:      Conjunctiva/sclera: Conjunctivae normal.      Pupils: Pupils are equal, round, and reactive to light.   Cardiovascular:      Rate and Rhythm: Normal rate and regular rhythm.      Heart sounds: Normal heart sounds.   Pulmonary:      Effort: Pulmonary effort is normal.      Breath sounds: Examination of the right-lower field reveals decreased breath sounds. Examination of the left-lower field reveals decreased breath sounds. Decreased breath sounds present. No wheezing, rhonchi or rales.   Abdominal:      General: Bowel sounds are normal.      Palpations: Abdomen is soft.   Musculoskeletal:         General: Normal range of motion.      Cervical back: Normal range of motion and neck supple.      Right lower leg: No tenderness. No edema.      Left lower leg: No tenderness. No edema.   Skin:     General: Skin is warm and dry.    Neurological:      Mental Status: She is alert and oriented to person, place, and time.      Deep Tendon Reflexes: Reflexes are normal and symmetric.   Psychiatric:         Behavior: Behavior normal.         Thought Content: Thought content normal.         Judgment: Judgment normal.         ED Course        Labs Reviewed   BASIC METABOLIC PANEL (8) - Abnormal; Notable for the following components:       Result Value    Glucose 111 (*)     Calculated Osmolality 297 (*)     All other components within normal limits   TROPONIN I HIGH SENSITIVITY - Abnormal; Notable for the following components:    Troponin I (High Sensitivity) 81 (*)     All other components within normal limits   BNP (B TYPE NATRIURETIC PEPTIDE) - Abnormal; Notable for the following components:    Beta Natriuretic Peptide 1,295 (*)     All other components within normal limits   EKG    Rate, intervals and axes as noted on EKG Report.  Rate: 95  Rhythm: Atrial Fibrillation  Reading: Incomplete right bundle branch block, no ST deviation, normal axis      Imaging Results Available and Reviewed while in ED: XR CHEST AP PORTABLE  (CPT=71045)    Result Date: 6/29/2024  CONCLUSION: Cardiomegaly.  Mild pulmonary edema.  Correlate for CHF.   Dictated by (CST): Genaro Mejia MD on 6/29/2024 at 3:54 PM     Finalized by (CST): Genaro Mejia MD on 6/29/2024 at 3:55 PM       MDM     Vitals:    06/29/24 1457 06/29/24 1600   BP: 154/90 (!) 133/94   Pulse: 103 81   Resp: 20 14   Temp: 98 °F (36.7 °C)    TempSrc: Oral    SpO2: 99% 97%   Weight: 58.1 kg    Height: 160 cm (5' 3\")      *I personally reviewed and interpreted all ED vitals.  I also personally reviewed all labs and imaging if ordered    Pulse Ox: 99%, Room air, Normal     Monitor Interpretation:   atrial fibrillation, with ventricular rate of 92    Differential Diagnosis/ Diagnostic Considerations: CHF exacerbation, PE, pneumonia, COVID-19, ACS    Medical Record Review: I personally reviewed available prior  medical records for any recent pertinent discharge summaries, testing, and procedures and reviewed those reports.    Medical Decision Making  64-year-old female presents ER with complaint of progressive shortness of breath for the past few days.  Patient also complained of some mild chest pain.  Patient with a past medical history of CHF.  Patient with diminished breath sounds in the bases bilaterally.  Patient with cardiomegaly with pulmonary congestion as well as elevated BNP.  Patient given 40 mg of Lasix IV and will be admitted to hospital for further evaluation.  [facility] notified of admission.  Hospitalist notified of admission.  Patient's  bedside made aware of the disposition and admission.    Total Critical Care Time: 32 minutes including time spent examining and re-evaluating the patient, ordering and reviewing laboratory tests, documenting, reviewing previous records, obtaining information from the family, and speaking with consultants, admitting doctors, nurses and medics and excludes any time spent on procedures.    Condition upon leaving the department: Stable    Disposition and Plan     Clinical Impression:  1. Acute on chronic congestive heart failure, unspecified heart failure type (HCC)    2. Elevated troponin        Disposition:  Admit    HISTORY AND PHYSICAL               Kirstin Sanders Patient Status:  Emergency    3/6/1960  64 year old [facility] [id number]   Location 44/44 Attending Gm Mckeon DO       PCP Eleazar Morales MD            DATE OF ADMISSION: 2024      CHIEF COMPLAINT: Shortness of breath     HISTORY OF PRESENT ILLNESS  This is a 64 year oldfemale who presents complaining of shortness of breath.  Patient states that symptoms have been present for the past week.  Patient reports shortness past with initially mild and intermittent.  Around 2 days prior to admission symptoms were progressively worsening.  Patient states she began developing worsening  shortness of breath when laying flat.  Became very short of breath with even minimal exertion.  Due to progression of symptoms prompted visit to ED for further evaluation.  Patient also described mild chest discomfort.  Of note, patient has a history of atrial fibrillation, CHF and hypertension.  Patient is on Plavix and Eliquis.  Patient states she was recently started on spironolactone as her only diuretic.  Upon further review, patient states she is recently been on vacation and states she has been trying to adhere to the fluid restriction, but has been eating out more frequently.       ASSESSMENT/PLAN     Acute on chronic heart failure with reduced ejection fraction   -Patient presenting with progressive shortness of breath  -Chest x-ray reviewed, noted mild pulmonary edema  -BNP noted to be elevated  -Last EF noted to be 40 to 45%  -Starting diuresis with Lasix 40mg IV BID  - Check Echo  - Strict I&Os, Daily weights, Fluid restriction  - Cardiology on consult, appreciate recs.      Troponin elevation  -Patient presenting with progressive shortness of breath and mild chest pain  -Likely secondary to acute CHF exacerbation as above  -Continue trending troponins  -Continue dual antiplatelets and statin  -Cardiology on consult, appreciate further recommendations     Permanent atrial fibrillation  -Rates currently at goal, restart home metoprolol  -Patient on chronic anticoagulation with Eliquis, will continue  -Telemetry monitoring  -Cardiology consulted, appreciate further recommendations     History of CAD  -Status post PCI in January 2024  -Restart home antiplatelet therapy  -Restart statin     HTN  - controlled  - CPM  - Monitor and adjust as needed        Plan of care discussed with patient and  at bedside.  Discussed with ED physician and RN. Decision made that pt needs hospitalization for further management/monitoring.        Apolinar Cartagena MD      6/30 CARDIOLOGY:    Reason for consultation:    Asked by Apolinar Cartagena MD to provide evaluation and management of dyspnea.     Assessment and Recommendations:      Acute on chronic congestive heart failure, unspecified heart failure type (HCC)  -Acute exacerbation may be due to dietary changes, med changes, or new ischemic substrate  -LVEF has decreased significantly, 25%  -Had headaches with Entresto, but she is open to trying it again  -Start Entresto 24-26 mg BID  -Stop losartan  -Continue Toprol XL 25 BID  -Continue Lasix 40 mg IV BID  -Monitor I/Os, weights, creat, lytes     Elevated troponin  Coronary artery disease  -S/p LAD PCI with bare-metal stent  -Worsening of LV function, need to reassess for ischemia  -Tomorrow, exercise nuclear stress test  -Based on results, may need repeat left heart cath  -Continue plavix     Atrial fibrillation, permanent  -Rate-controlled  -Continue Toprol XL  -Continue Eliquis        Thank you for the consultation.     Nadia Borrero MD  Cardiac EP  [facility]  6/30/2024  C5        7/1 CARDIOLOGY:    History of present illness:   The patient is a 64 year old with CAD s/p PCI BMS, ischemic CM, permanent AF, who has had 1 week of progressively worsening dyspnea.  She developed orthopnea a few days ago, as well.  She had no edema.  She feels a chest gurgling after taking losartan.  She had headaches after 2 days of Entresto in the past.  She was tolerating spironolactone.  She has no lightheadedness or syncope.     ROS:   Constitutional: No fevers, chills, fatigue or night sweats.  ENT: No mouth pain, neck pain, running nose, headaches or swollen glands.  Skin: No rashes, pruritus or skin changes,  Respiratory: No cough, wheezing, + shortness of breath.  CV: No chest pain or claudication.  Musculoskeletal: No joint pain, stiffness or swelling.  : No dysuria or hematuria.  GI: No nausea, vomiting or diarrhea. No blood in stools.  Neurologic: No seizures, tremors, weakness or numbness.          Subjective:  Pt denies chest pain, SOB, palpitations or orthopnea. Able to lay flat in bed last night to sleep.      Objective:  BP (!) 132/95   Pulse 78   Temp 98 °F (36.7 °C) (Oral)   Resp 14   Ht 5' 3\" (1.6 m)   Wt 126 lb (57.2 kg)   SpO2 97%   BMI 22.32 kg/m²      Telemetry: Afib, 90's-100's at rest; up to 160-180 with ambulation at times     Intake/Output:     Intake/Output Summary (Last 24 hours) at 7/1/2024 1021  Last data filed at 7/1/2024 0715      Gross per 24 hour   Intake 210 ml   Output 3000 ml   Net -2790 ml        Assessment:  Acute on Chronic HF   BNP 1295, CXR w/pulm edema  Previously LVEF 40-45% (11/2023)  Echo LVEF 25%, RV function reduced, biatrial dilation, mild AI, mild MR  GDMT: On toprol 25mg BID, entresto 24/26 BID, spironolactone 12.5mg daily   Had recently stopped Entresto d/t headaches as OP  Diuresing w/IV Lasix 40mg BID  Net neg 2L, wt down 4 lbs  Elevated Troponin  Flat trend 75>71>71  ECG w/afib, non-specific changes  Exercise stress pending today  Hx CAD s/p PCI-LAD 1/2024  On Eliquis, Plavix, statin  Permanent Atrial Fibrillation   Rate controlled on Toprol  A/C with Eliquis 5mg po BID   Hypertension - controlled  On toprol, entresto  Hyperlipidemia - LDL 60, on atorvastatin 40mg      Plan:  Afib with controlled HR at rest but increased to 160's-170's with ambulation. Increase metoprolol to 50mg po daily  Continue Eliquis 5mg po BID  Continue Entresto - BP stable and no complaints of headaches thus far  Appears compensated on exam from volume standpoint - will discontinue IV Lasix.   Start spironolactone 12.5mg po daily   Await stress test results today - further recommendations to follow      Plan of care discussed with patient, RN.     WALTER Moore  7/1/2024  10:21 AM  [phone] [facility]  [phone] [facility]      MEDICATIONS ADMINISTERED IN LAST 1 DAY:  acetaminophen (Tylenol) tab 650 mg       Date Action Dose Route User    6/30/2024 2139 Given  650 mg Oral Gypsy Campbell RN          apixaban (Eliquis) tab 5 mg       Date Action Dose Route User    7/1/2024 1046 Given 5 mg Oral Elisa Robles RN    6/30/2024 2123 Given 5 mg Oral Gypsy Campbell RN          atorvastatin (Lipitor) tab 40 mg       Date Action Dose Route User    6/30/2024 2123 Given 40 mg Oral Gypsy Campbell RN          clopidogrel (Plavix) tab 75 mg       Date Action Dose Route User    6/30/2024 1848 Given by Other 75 mg Oral Salima Ward RN          furosemide (Lasix) 10 mg/mL injection 40 mg       Date Action Dose Route User    7/1/2024 1046 Given 40 mg Intravenous Elisa Robles RN    6/30/2024 1848 Given by Other 40 mg Intravenous Salima Wrad RN          magnesium oxide (Mag-Ox) tab 400 mg       Date Action Dose Route User    7/1/2024 0147 Given 400 mg Oral Gypsy Campbell RN          metoprolol succinate ER (Toprol XL) 24 hr tab 25 mg       Date Action Dose Route User    6/30/2024 2123 Given 25 mg Oral Gypsy Campbell RN          sacubitril-valsartan (Entresto) 24-26 MG per tab 1 tablet       Date Action Dose Route User    7/1/2024 1046 Given 1 tablet Oral Elisa Robles RN            Vitals (last day)       Date/Time Temp Pulse Resp BP SpO2 Weight O2 Device O2 Flow Rate (L/min) [facility]    07/01/24 1044 97.4 °F (36.3 °C) 120 18 118/93 97 % -- None (Room air) -- CR    07/01/24 0649 -- -- -- -- -- 126 lb -- -- TB    07/01/24 0544 -- -- -- 132/95 -- -- -- -- MW    07/01/24 0149 -- 78 14 139/87 97 % -- None (Room air) -- MW    06/30/24 2224 -- 166 -- -- -- -- -- -- KD    06/30/24 2124 -- 107 -- 120/87 -- -- -- -- MW    06/30/24 2114 98 °F (36.7 °C) 97 18 94/79 96 % -- None (Room air) -- MW    06/30/24 2000 -- 105 -- -- -- -- -- -- KD    06/30/24 1954 -- 174 -- -- -- -- -- -- KD    06/30/24 1846 -- 85 16 109/72 95 % -- None (Room air) -- ES    06/30/24 1419 97.7 °F (36.5 °C) 93 14 112/79 94 % -- None (Room air) -- MWA    06/30/24 0811 97.6 °F (36.4 °C) 83 16 128/81 96 % -- None  (Room air) -- MWA    06/30/24 0557 97.7 °F (36.5 °C) 85 16 128/76 97 % -- None (Room air) --     06/30/24 0413 -- -- -- -- -- 130 lb -- -- TB    06/30/24 0100 -- 80 -- -- -- -- -- --

## 2024-07-01 NOTE — PLAN OF CARE
Kirstin QUEZADA&Vijaya4. Patient being discharge home. IV and telebox removed at time of discharge. All discharge paperwork reviewed and all questions and concerned answered and all paper prescription sent with patient if applicable. facility. Patient brought down to exit via wheelchair by medical staff.

## 2024-07-01 NOTE — PROGRESS NOTES
Progress Note  Kirstin Sanders Patient Status:  Inpatient    3/6/1960 MRN [id number]   Location [facility] 3W/SW Attending Apolinar Cartagena MD   Hosp Day # 2 PCP Eleazar Morales MD     Subjective:  Pt denies chest pain, SOB, palpitations or orthopnea. Able to lay flat in bed last night to sleep.     Objective:  BP (!) 132/95   Pulse 78   Temp 98 °F (36.7 °C) (Oral)   Resp 14   Ht 5' 3\" (1.6 m)   Wt 126 lb (57.2 kg)   SpO2 97%   BMI 22.32 kg/m²     Telemetry: Afib, 90's-100's at rest; up to 160-180 with ambulation at times    Intake/Output:    Intake/Output Summary (Last 24 hours) at 2024 1021  Last data filed at 2024 0715  Gross per 24 hour   Intake 210 ml   Output 3000 ml   Net -2790 ml       Last 3 Weights   24 0649 126 lb (57.2 kg)   24 0413 130 lb (59 kg)   24 1847 130 lb (59 kg)   24 1457 128 lb (58.1 kg)   24 1159 130 lb (59 kg)   24 1717 131 lb (59.4 kg)   24 1007 130 lb (59 kg)   02/15/24 1346 131 lb (59.4 kg)       Labs:  Recent Labs   Lab 24  0920 24  2310 24  0713   * 126* 148*   BUN 10 27* 17   CREATSERUM 0.83 0.91 0.78   EGFRCR 80 71 86   CA 10.1 10.0 9.7    137 137   K 4.5 4.0 4.2    101 103   CO2 28.0 30.0 26.0     Recent Labs   Lab 24  1539 24  0920 24  0713   RBC 4.36 4.97 5.51*   HGB 13.4 15.3 16.6*   HCT 39.9 46.6 53.5*   MCV 91.5 93.8 97.1   MCH 30.7 30.8 30.1   MCHC 33.6 32.8 31.0   RDW 13.1 13.2 13.0   NEPRELIM 6.14 4.25 3.08   WBC 10.0 8.3 7.0   .0 272.0 281.0         Recent Labs   Lab 24  1754 24  1855 24  0044   TROPHS 75* 71* 70*       Diagnostics:  No results found.   Review of Systems   Constitutional: Negative.   Cardiovascular:  Negative for chest pain, dyspnea on exertion, leg swelling, orthopnea, palpitations and paroxysmal nocturnal dyspnea.   Respiratory: Negative.         Physical Exam:    Gen: alert, oriented x 3,  NAD  Heent: pupils equal, reactive. Mucous membranes moist.   Neck: no jvd  Cardiac: regular rate and rhythm, normal S1,S2, no murmur, clicks, rub or gallop  Lungs: CTA  Abd: soft, NT/ND +bs  Ext: no edema  Skin: Warm, dry  Neuro: No focal deficits      Medications:     sacubitril-valsartan  1 tablet Oral BID    atorvastatin  40 mg Oral Nightly    clopidogrel  75 mg Oral Daily    apixaban  5 mg Oral BID    metoprolol succinate ER  25 mg Oral Nightly    furosemide  40 mg Intravenous BID (Diuretic)       Assessment:  Acute on Chronic HF   BNP 1295, CXR w/pulm edema  Previously LVEF 40-45% (11/2023)  Echo LVEF 25%, RV function reduced, biatrial dilation, mild AI, mild MR  GDMT: On toprol 25mg BID, entresto 24/26 BID, spironolactone 12.5mg daily   Had recently stopped Entresto d/t headaches as OP  Diuresing w/IV Lasix 40mg BID  Net neg 2L, wt down 4 lbs  Elevated Troponin  Flat trend 75>71>71  ECG w/afib, non-specific changes  Exercise stress pending today  Hx CAD s/p PCI-LAD 1/2024  On Eliquis, Plavix, statin  Permanent Atrial Fibrillation   Rate controlled on Toprol  A/C with Eliquis 5mg po BID   Hypertension - controlled  On toprol, entresto  Hyperlipidemia - LDL 60, on atorvastatin 40mg     Plan:  Afib with controlled HR at rest but increased to 160's-170's with ambulation. Increase metoprolol to 50mg po daily  Continue Eliquis 5mg po BID  Continue Entresto - BP stable and no complaints of headaches thus far  Appears compensated on exam from volume standpoint - will discontinue IV Lasix.   Start spironolactone 12.5mg po daily   Await stress test results today - further recommendations to follow     Plan of care discussed with patient, RN.    Amy Fitch, WALTER  7/1/2024  10:21 AM  [phone] [facility]  [phone] [facility]          CARDIOLOGIST ADDENDUM:    I agree with the findings above.  I have personally performed the Assessment and Plan in its entirety, as detailed below:    Acute on Chronic HF    -Previously LVEF 40-45% (11/2023), now Mary Ellen mayo EF 37%  -Continue GDMT: On toprol 25mg BID, entresto 24/26 BID, spironolactone 12.5mg daily   -Add spironolactine    CAD  -S/p PCI-LAD 1/2024  -Stress test: no perfusion abnormalities  -Continue Plavix, statin    Permanent AF  -Rate controlled  -Continue Toprol  -Continue Eliquis 5mg po BID     Nadia Borrero M.D.   Cardiology/Electrophysiology   7/1/2024  L3  -----------------------------------------

## 2024-07-01 NOTE — DISCHARGE PLANNING
Going Home Instructions  In this section you will find the tools which will guide you through the first few days after you leave the hospital. Continued use of these tools will help you develop the skills necessary to keep your heart failure under control.     Home Care Instructions Following Heart Failure - the most important things to do every day include:   Weigh yourself and review the “Self-Check Plan” sheet every morning.   Call your cardiologist office if you are in the “Pay Attention-Use Caution” (yellow zone) or “Medical Alert-Warning!” (red zone) as outlined in the Self-Check Plan sheet.  Take your medicines as prescribed.  Limit your sodium (salt) intake.  Know when to call your cardiologist, primary doctor, or nurse.  Know when to seek emergency care.      Things for You to Remember:   1. See your doctor or healthcare provider as written on your discharge instructions.  It is important that you attend this appointment to make sure your symptoms are under control.     2. Your recommended sodium intake is 4622-4865 mg daily.    3.  Weigh yourself every day.    4. Some exercise and activity is important to help keep your heart functioning and strong. Unless instructed not to exercise, you may walk at a slow to moderate pace for 10-15 minutes 2-3 days per week to start. Pace your activity to prevent shortness of breath or fatigue. Stop exercising if you develop chest pain, lightheadedness, or significant shortness of breath.       Call Your Cardiologist If:   You gain 2-3 pounds in one day or 5 pounds in one week.  You have more difficulty breathing.  You are getting more tired with normal activity.  You are more short of breath lying down, or awaken at night short of breath.  You have swelling of your feet or legs.  You urinate less often during the day and more often at night.  You have cramps in your legs.  You have blurred vision or see yellowish-green halos around objects of lights.    Go to the  Emergency Room If:   You have pain or tightness in your chest  You are extremely short of breath  You are coughing up pink-frothy mucus  You are traveling and develop symptoms of worsening heart failure      ** Please follow up with your cardiologist or Advanced Practice Provider as written on your discharge instructions. If you are not provided with an appointment, let your nurse know so you can get an appointment**

## 2024-07-01 NOTE — PROGRESS NOTES
[facility]  part of [facility]     Hospitalist Progress Note     Kirstin Sanders Patient Status:  Inpatient    3/6/1960 MRN [id number]   Location [facility] 3W/SW Attending Apolinar Cartagena MD   Hosp Day # 2 PCP Eleazar Morales MD     Chief Complaint:   Chief Complaint   Patient presents with    Difficulty Breathing    Chest Pain Angina        Subjective:     Patient seen lying in bed.  No family at bedside.  Patient denies acute events overnight.  Patient reports shortness of breath resolved.  Patient denies current chest pain.  Patient otherwise denies abdominal pain, nausea vomit, fevers or chills.    Objective:      Vital signs:  Vitals:    24 0149 24 0544 24 0649 24 1044   BP: 139/87 (!) 132/95  (!) 118/93   BP Location: Right arm   Right arm   Pulse: 78   120   Resp: 14   18   Temp:    97.4 °F (36.3 °C)   TempSrc:    Oral   SpO2: 97%   97%   Weight:   126 lb (57.2 kg)    Height:           Intake/Output Summary (Last 24 hours) at 2024 1137  Last data filed at 2024 1000  Gross per 24 hour   Intake 410 ml   Output 1900 ml   Net -1490 ml           Physical Exam:    GENERAL:  Awake and alert, in no acute distress.  HEART: Irregular rhythm.  Regular rate   LUNGS:  Air entry was minimally decreased.  No increased work of breathing or wheezes   ABDOMEN: Soft and non-tender.   EXTREMITIES: No edema appreciated  PSYCHIATRIC: Normal mood    Diagnostic Data:    Labs:    Recent Labs   Lab 24  1539 24  0920 24  0713   WBC 10.0 8.3 7.0   HGB 13.4 15.3 16.6*   MCV 91.5 93.8 97.1   .0 272.0 281.0       Recent Labs   Lab 24  0920 24  2310 24  0713   * 126* 148*   BUN 10 27* 17   CREATSERUM 0.83 0.91 0.78   CA 10.1 10.0 9.7   ALB 4.9*  --   --     137 137   K 4.5 4.0 4.2    101 103   CO2 28.0 30.0 26.0   ALKPHO 113  --   --    AST 27  --   --    ALT 30  --   --    BILT 2.5*  --   --    TP 8.0  --    --            Estimated Creatinine Clearance: 61.9 mL/min (based on SCr of 0.78 mg/dL).    No results for input(s): \"PTP\", \"INR\" in the last 168 hours.         COVID-19  No results found for: \"COVID19\"    Pro-Calcitonin  No results for input(s): \"PCT\" in the last 168 hours.    Cardiac  No results for input(s): \"TROP\", \"PBNP\" in the last 168 hours.    Inflammatory Markers  No results for input(s): \"CRP\", \"MYLENE\", \"LDH\", \"DDIMER\" in the last 168 hours.    Culture:  No results found for this visit on 06/29/24.    CARD NUC EXERCISE STRESS (REST/EXER) (CPT 95349)    Result Date: 7/1/2024  CONCLUSION:  1. Treadmill exercise perfusion imaging study without clear evidence of reversible stress-induced ischemia.  2. Diminished left ventricular ejection fraction of 37%.   3. There is globally hypokinetic wall motion.    Dictated by (CST): Jimi Love MD on 7/01/2024 at 10:49 AM     Finalized by (CST): Jimi Love MD on 7/01/2024 at 10:50 AM          XR CHEST AP PORTABLE  (CPT=71045)    Result Date: 6/29/2024  CONCLUSION: Cardiomegaly.  Mild pulmonary edema.  Correlate for CHF.   Dictated by (CST): Genaro Mejia MD on 6/29/2024 at 3:54 PM     Finalized by (CST): Genaro Mejia MD on 6/29/2024 at 3:55 PM           EKG 12 Lead    Result Date: 6/30/2024  Atrial fibrillation Incomplete right bundle branch block Left anterior fascicular block Nonspecific T wave abnormality Abnormal ECG No previous ECGs found in [facility] Confirmed by JANENE FINN ([id number]) on 6/30/2024 11:56:03 AM     Medications:    sacubitril-valsartan  1 tablet Oral BID    atorvastatin  40 mg Oral Nightly    clopidogrel  75 mg Oral Daily    apixaban  5 mg Oral BID    metoprolol succinate ER  25 mg Oral Nightly    furosemide  40 mg Intravenous BID (Diuretic)       Assessment & Plan:        Acute on chronic heart failure with reduced ejection fraction   -Patient presenting with progressive shortness of breath  -Chest x-ray reviewed, noted mild pulmonary edema  -BNP  noted to be elevated on admission  -Last EF noted to be 40 to 45%  -Appearing more euvolemic.   -Diuretics changed to Aldactone  -echo reviewed, noted decreased EF to 25%  - Strict I&Os, Daily weights, Fluid restriction  -Further eval with stress testing 7/1.  - Cardiology on consult, appreciate further recs.      Troponin elevation  -Patient presenting with progressive shortness of breath and mild chest pain  -Likely secondary to acute CHF exacerbation as above  -Troponins with flat trend.  -Without further chest pain  -Likely type II MI secondary to demand ischemia from CHF exacerbation as above  -Continue dual antiplatelets and statin  -Cardiology on consult, appreciate further recommendations     Permanent atrial fibrillation  -Rates currently at goal, continue metoprolol  -Patient on chronic anticoagulation with Eliquis, will continue  -Telemetry monitoring  -Cardiology consulted, appreciate further recommendations     History of CAD  -Status post PCI in January 2024  -Continue antiplatelet therapy  -Continue statin     HTN  - controlled  - CPM  - Monitor and adjust as needed      Plan of care discussed with patient at bedside . Discussed management/test result(s) with Rn and cardiology consultant    ADDENDUM: Stress test reviewed. No clear reversible defects. EF 37%. Global hypokinesis.     Quality:  DVT Prophylaxis: Eliquis  CODE status: Full  Estimated date of discharge: TBD  Discharge is dependent on: clinical stability      Apolinar Cartagena MD          This note was prepared using Dragon Medical voice recognition dictation software. As a result errors may occur. When identified these errors have been corrected. While every attempt is made to correct errors during dictation discrepancies may still exist

## 2024-07-01 NOTE — PLAN OF CARE
Patient had Stress test done. See results. HR in 170s when ambulation. Cardiology aware. IV lasix BID.   Problem: Patient Centered Care  Goal: Patient preferences are identified and integrated in the patient's plan of care  Description: Interventions:  - What would you like us to know as we care for you? Home with    - Provide timely, complete, and accurate information to patient/family  - Incorporate patient and family knowledge, values, beliefs, and cultural backgrounds into the planning and delivery of care  - Encourage patient/family to participate in care and decision-making at the level they choose  - Honor patient and family perspectives and choices  7/1/2024 1131 by Elisa Robles RN  Outcome: Progressing  7/1/2024 1030 by Elisa Robles RN  Outcome: Progressing     Problem: Patient/Family Goals  Goal: Patient/Family Long Term Goal  Description: Patient's Long Term Goal: improve cardiac function     Interventions:  - follow plan of care  -medication  -stress test    - See additional Care Plan goals for specific interventions  7/1/2024 1131 by Elisa Robles RN  Outcome: Progressing  7/1/2024 1030 by Elisa Robles RN  Outcome: Progressing  Goal: Patient/Family Short Term Goal  Description: Patient's Short Term Goal: home     Interventions:   - cardiac monitoring  -vitals and labs  -medication   -stress test  - See additional Care Plan goals for specific interventions  7/1/2024 1131 by Elisa Robles RN  Outcome: Progressing  7/1/2024 1030 by Elisa Robles RN  Outcome: Progressing     Problem: CARDIOVASCULAR - ADULT  Goal: Maintains optimal cardiac output and hemodynamic stability  Description: INTERVENTIONS:  - Monitor vital signs, rhythm, and trends  - Monitor for bleeding, hypotension and signs of decreased cardiac output  - Evaluate effectiveness of vasoactive medications to optimize hemodynamic stability  - Monitor arterial and/or venous puncture sites for bleeding and/or hematoma  -  Assess quality of pulses, skin color and temperature  - Assess for signs of decreased coronary artery perfusion - ex. Angina  - Evaluate fluid balance, assess for edema, trend weights  7/1/2024 1131 by Elisa Robles RN  Outcome: Progressing  7/1/2024 1030 by Elisa Robles RN  Outcome: Progressing  Goal: Absence of cardiac arrhythmias or at baseline  Description: INTERVENTIONS:  - Continuous cardiac monitoring, monitor vital signs, obtain 12 lead EKG if indicated  - Evaluate effectiveness of antiarrhythmic and heart rate control medications as ordered  - Initiate emergency measures for life threatening arrhythmias  - Monitor electrolytes and administer replacement therapy as ordered  7/1/2024 1131 by Elisa Robles RN  Outcome: Progressing  7/1/2024 1030 by Elisa Robles RN  Outcome: Progressing     Problem: SAFETY ADULT - FALL  Goal: Free from fall injury  Description: INTERVENTIONS:  - Assess pt frequently for physical needs  - Identify cognitive and physical deficits and behaviors that affect risk of falls.  - [place] fall precautions as indicated by assessment.  - Educate pt/family on patient safety including physical limitations  - Instruct pt to call for assistance with activity based on assessment  - Modify environment to reduce risk of injury  - Provide assistive devices as appropriate  - Consider OT/PT consult to assist with strengthening/mobility  - Encourage toileting schedule  7/1/2024 1131 by Elisa Robles RN  Outcome: Progressing  7/1/2024 1030 by Elisa Robles RN  Outcome: Progressing     Problem: DISCHARGE PLANNING  Goal: Discharge to home or other facility with appropriate resources  Description: INTERVENTIONS:  - Identify barriers to discharge w/pt and caregiver  - Include patient/family/discharge partner in discharge planning  - Arrange for needed discharge resources and transportation as appropriate  - Identify discharge learning needs (meds, wound care, etc)  - Arrange for  interpreters to assist at discharge as needed  - Consider post-discharge preferences of patient/family/discharge partner  - Complete POLST form as appropriate  - Assess patient's ability to be responsible for managing their own health  - Refer to Case Management Department for coordinating discharge planning if the patient needs post-hospital services based on physician/LIP order or complex needs related to functional status, cognitive ability or social support system  7/1/2024 1131 by Elisa Robles, RN  Outcome: Progressing  7/1/2024 1030 by Elisa Robles, RN  Outcome: Progressing

## 2024-07-02 ENCOUNTER — TELEPHONE (OUTPATIENT)
Dept: INTERNAL MEDICINE CLINIC | Facility: CLINIC | Age: 64
End: 2024-07-02

## 2024-07-02 ENCOUNTER — PATIENT OUTREACH (OUTPATIENT)
Dept: CASE MANAGEMENT | Age: 64
End: 2024-07-02

## 2024-07-02 DIAGNOSIS — I50.9 ACUTE ON CHRONIC CONGESTIVE HEART FAILURE, UNSPECIFIED HEART FAILURE TYPE (HCC): Primary | ICD-10-CM

## 2024-07-02 DIAGNOSIS — Z02.9 ENCOUNTERS FOR ADMINISTRATIVE PURPOSE: ICD-10-CM

## 2024-07-02 LAB
% OF MAX PREDICTED HR: 100 %
MAX DIASTOLIC BP: 89 MMHG
MAX HEART RATE: 190 BPM
MAX PREDICTED HEART RATE: 158 BPM
MAX SYSTOLIC BP: 167 MMHG
MAX WORK LOAD: 46

## 2024-07-02 PROCEDURE — 1111F DSCHRG MED/CURRENT MED MERGE: CPT

## 2024-07-02 NOTE — PAYOR COMM NOTE
--------------  DISCHARGE REVIEW    Payor: [employer]/MCNP  Subscriber #:  [id number]  Authorization Number: [id number]    Admit date: 24  Admit time:   6:43 PM  Discharge Date: 2024  4:32 PM     Admitting Physician: Apolinar Cartagena MD  Attending Physician:  No att. providers found  Primary Care Physician: Eleazar Morales MD          Discharge Summary Notes        Discharge Summary signed by Apolinar Cartagena MD at 2024  8:59 AM       Author: Apolinar Cartagena MD Specialty: HOSPITALIST, Internal Medicine Author Type: Physician    Filed: 2024  8:59 AM Date of Service: 2024  3:56 PM Status: Signed    : Apolinar Cartagena MD (Physician)         [facility]  part of [facility]    Discharge Summary    Kirstin Sanders Patient Status:  Inpatient    3/6/1960 MRN [id number]   Location [facility] 3W/ Attending No att. providers found   Hosp Day # 2 PCP Eleazar Morales MD     Date of Admission: 2024     Date of Discharge: 24      Lace+ Score: 60  59-90 High Risk  29-58 Medium Risk  0-28   Low Risk.    TCM Follow-Up Recommendation:  LACE > 58: High Risk of readmission after discharge from the hospital.    DISCHARGE DX: Principal Problem:    Acute on chronic congestive heart failure, unspecified heart failure type (HCC)  Active Problems:    Elevated troponin    Acute CHF (congestive heart failure) (HCC)       The patient was seen and examined on day of discharge and this discharge summary is in conjunction with any daily progress note from day of discharge.    HPI per admitting physician: \"This is a 64 year oldfemale who presents complaining of shortness of breath.  Patient states that symptoms have been present for the past week.  Patient reports shortness past with initially mild and intermittent.  Around 2 days prior to admission symptoms were progressively worsening.  Patient states she began developing worsening shortness of breath  when laying flat.  Became very short of breath with even minimal exertion.  Due to progression of symptoms prompted visit to ED for further evaluation.  Patient also described mild chest discomfort.  Of note, patient has a history of atrial fibrillation, CHF and hypertension.  Patient is on Plavix and Eliquis.  Patient states she was recently started on spironolactone as her only diuretic.  Upon further review, patient states she is recently been on vacation and states she has been trying to adhere to the fluid restriction, but has been eating out more frequently. \"    Hospital Course:     Acute on chronic heart failure with reduced ejection fraction   -Patient presenting with progressive shortness of breath  -Chest x-ray reviewed, noted mild pulmonary edema  -BNP noted to be elevated on admission  -Last EF noted to be 40 to 45%  -Appearing more euvolemic.   -Diuretics changed to Aldactone  -echo reviewed, noted decreased EF to 25%  - Strict I&Os, Daily weights, Fluid restriction  -Further eval with stress testing 7/1, which was without new perfusion abnormalities.  - Cardiology on consult,  plan for follow up as outpt     Troponin elevation  -Patient presenting with progressive shortness of breath and mild chest pain  -Likely secondary to acute CHF exacerbation as above  -Troponins with flat trend.  -Without further chest pain  -Likely type II MI secondary to demand ischemia from CHF exacerbation as above  -Further eval with stress testing 7/1, which was without new perfusion abnormalities.  -Continue dual antiplatelets and statin  -Cardiology on consult, appreciate further recommendations     Permanent atrial fibrillation  -Rates currently at goal, continue metoprolol  -Patient on chronic anticoagulation with Eliquis, will continue  -Telemetry monitoring  -Cardiology consulted, F/u as outpt     History of CAD  -Status post PCI in January 2024  -Continue antiplatelet therapy  -Continue statin     HTN  -  controlled  - CPM  - Monitor and adjust as needed      Physical Exam:    Vitals:    07/01/24 0149 07/01/24 0544 07/01/24 0649 07/01/24 1044   BP: 139/87 (!) 132/95  (!) 118/93   BP Location: Right arm   Right arm   Pulse: 78   120   Resp: 14   18   Temp:    97.4 °F (36.3 °C)   TempSrc:    Oral   SpO2: 97%   97%   Weight:   126 lb (57.2 kg)    Height:         Patient Weight for the past 72 hrs:   Weight   06/29/24 1457 128 lb (58.1 kg)   06/29/24 1847 130 lb (59 kg)   06/30/24 0413 130 lb (59 kg)   07/01/24 0649 126 lb (57.2 kg)       Intake/Output Summary (Last 24 hours) at 7/2/2024 0856  Last data filed at 7/1/2024 1100  Gross per 24 hour   Intake 500 ml   Output --   Net 500 ml         CULTURE:   No results found for this visit on 06/29/24.    IMAGING STUDIES: SOME MAY NEED FOLLOW UP WITH PCP   CARD NUC EXERCISE STRESS (REST/EXER) (CPT 97998)    Result Date: 7/1/2024  CONCLUSION:  1. Treadmill exercise perfusion imaging study without clear evidence of reversible stress-induced ischemia.  2. Diminished left ventricular ejection fraction of 37%.   3. There is globally hypokinetic wall motion.    Dictated by (CST): Jimi Love MD on 7/01/2024 at 10:49 AM     Finalized by (CST): Jimi Love MD on 7/01/2024 at 10:50 AM          XR CHEST AP PORTABLE  (CPT=71045)    Result Date: 6/29/2024  CONCLUSION: Cardiomegaly.  Mild pulmonary edema.  Correlate for CHF.   Dictated by (CST): Genaro Mejia MD on 6/29/2024 at 3:54 PM     Finalized by (CST): Genaro Mejia MD on 6/29/2024 at 3:55 PM           LABS :     Lab Results   Component Value Date    WBC 7.0 07/01/2024    HGB 16.6 (H) 07/01/2024    HCT 53.5 (H) 07/01/2024    .0 07/01/2024    CREATSERUM 0.78 07/01/2024    BUN 17 07/01/2024     07/01/2024    K 4.2 07/01/2024     07/01/2024    CO2 26.0 07/01/2024     (H) 07/01/2024    CA 9.7 07/01/2024    ALB 4.9 (H) 06/30/2024    ALKPHO 113 06/30/2024    BILT 2.5 (H) 06/30/2024    TP 8.0 06/30/2024     AST 27 06/30/2024    ALT 30 06/30/2024    TSH 2.430 07/13/2023    ESRML 7 12/22/2023    CRP <0.40 12/22/2023    MG 1.7 06/30/2024    PHOS 3.4 04/14/2024    CK 82 02/22/2022    B12 >2,000 (H) 07/13/2023       Recent Labs   Lab 06/29/24  1539 06/30/24  0920 07/01/24  0713   RBC 4.36 4.97 5.51*   HGB 13.4 15.3 16.6*   HCT 39.9 46.6 53.5*   MCV 91.5 93.8 97.1   MCH 30.7 30.8 30.1   MCHC 33.6 32.8 31.0   RDW 13.1 13.2 13.0   NEPRELIM 6.14 4.25 3.08   WBC 10.0 8.3 7.0   .0 272.0 281.0     Recent Labs   Lab 06/30/24  0920 06/30/24  2310 07/01/24  0713   * 126* 148*   BUN 10 27* 17   CREATSERUM 0.83 0.91 0.78   CA 10.1 10.0 9.7   ALB 4.9*  --   --     137 137   K 4.5 4.0 4.2    101 103   CO2 28.0 30.0 26.0   ALKPHO 113  --   --    AST 27  --   --    ALT 30  --   --    BILT 2.5*  --   --    TP 8.0  --   --      No results found for: \"PT\", \"INR\"    Disposition: Discharge to Home    Condition at Discharge: Stable     Discharge Medications:      Discharge Medications        START taking these medications        Instructions Prescription details   sacubitril-valsartan 24-26 MG Tabs  Commonly known as: Entresto      Take 1 tablet by mouth 2 (two) times daily.   Quantity: 60 tablet  Refills: 3            CHANGE how you take these medications        Instructions Prescription details   metoprolol succinate ER 50 MG Tb24  Commonly known as: Toprol XL  What changed: how much to take      Take 1 tablet (50 mg total) by mouth nightly.   Quantity: 30 tablet  Refills: 3            CONTINUE taking these medications        Instructions Prescription details   atorvastatin 40 MG Tabs  Commonly known as: Lipitor      Take 1 tablet (40 mg total) by mouth nightly.   Quantity: 90 tablet  Refills: 3     clopidogrel 75 MG Tabs  Commonly known as: Plavix      Take 1 tablet (75 mg total) by mouth daily.   Quantity: 90 tablet  Refills: 3     docusate sodium 100 MG Caps  Commonly known as: Colace      Take 1 capsule (100 mg  total) by mouth 2 (two) times daily as needed for constipation.   Quantity: 60 capsule  Refills: 0     Eliquis 5 MG Tabs  Generic drug: apixaban      TK 1 T PO  BID   Refills: 5     HYDROcodone-acetaminophen 5-325 MG Tabs  Commonly known as: Norco      Take 1 tablet by mouth every 6 (six) hours as needed for Pain.   Quantity: 20 tablet  Refills: 0     ibuprofen 600 MG Tabs  Commonly known as: Motrin      Take 1 tablet (600 mg total) by mouth every 8 (eight) hours as needed for Pain.   Quantity: 60 tablet  Refills: 0     spironolactone 25 MG Tabs  Commonly known as: Aldactone      Take 0.5 tablets (12.5 mg total) by mouth daily.   Refills: 0            STOP taking these medications      losartan 50 MG Tabs  Commonly known as: Cozaar                  Where to Get Your Medications        These medications were sent to "[id number] - [place], [place] - [address] AT [facility], [phone], [phone]  [address], [facility] [address]      Phone: [phone]   metoprolol succinate ER 50 MG Tb24  sacubitril-valsartan 24-26 MG Tabs         Follow up Visits  Nadia Borrero MD  [address] [place] [address] [id number] [phone]    Follow up  Office will call you to schedule follow up    Eleazar Morales MD  [address] [facility] [id number] [phone]    Follow up      Eleazar Morales MD    Consultants         Provider   Role Specialty     Nadia Borrero MD      Consulting Physician Cardiac Electrophysiology              Other Discharge Instructions:   Discharge References/Attachments    Heart Failure, Discharge Instructions for (English)  Sacubitril/Valsartan Oral Tablet (English)         ----------------------------------------------------  38 MIN SPENT ON THIS DC   Apolinar Cartagena MD    Electronically signed by Apolinar Cartagena MD on 7/2/2024  8:59 AM

## 2024-07-02 NOTE — TELEPHONE ENCOUNTER
Maciej ABAD is a TCM and high risk for readmission. It is recommended she be seen by 7/8/24.    TCM book by date: 7/15/24

## 2024-07-02 NOTE — DISCHARGE SUMMARY
[facility]  part of [facility]    Discharge Summary    Kirstin Sanders Patient Status:  Inpatient    3/6/1960 MRN [id number]   Location [facility] 3W/SW Attending No att. providers found   Hosp Day # 2 PCP Eleazar Morales MD     Date of Admission: 2024     Date of Discharge: 24      Lace+ Score: 60  59-90 High Risk  29-58 Medium Risk  0-28   Low Risk.    TCM Follow-Up Recommendation:  LACE > 58: High Risk of readmission after discharge from the hospital.    DISCHARGE DX: Principal Problem:    Acute on chronic congestive heart failure, unspecified heart failure type (HCC)  Active Problems:    Elevated troponin    Acute CHF (congestive heart failure) (HCC)       The patient was seen and examined on day of discharge and this discharge summary is in conjunction with any daily progress note from day of discharge.    HPI per admitting physician: \"This is a 64 year oldfemale who presents complaining of shortness of breath.  Patient states that symptoms have been present for the past week.  Patient reports shortness past with initially mild and intermittent.  Around 2 days prior to admission symptoms were progressively worsening.  Patient states she began developing worsening shortness of breath when laying flat.  Became very short of breath with even minimal exertion.  Due to progression of symptoms prompted visit to ED for further evaluation.  Patient also described mild chest discomfort.  Of note, patient has a history of atrial fibrillation, CHF and hypertension.  Patient is on Plavix and Eliquis.  Patient states she was recently started on spironolactone as her only diuretic.  Upon further review, patient states she is recently been on vacation and states she has been trying to adhere to the fluid restriction, but has been eating out more frequently. \"    Hospital Course:     Acute on chronic heart failure with reduced ejection fraction   -Patient presenting with  progressive shortness of breath  -Chest x-ray reviewed, noted mild pulmonary edema  -BNP noted to be elevated on admission  -Last EF noted to be 40 to 45%  -Appearing more euvolemic.   -Diuretics changed to Aldactone  -echo reviewed, noted decreased EF to 25%  - Strict I&Os, Daily weights, Fluid restriction  -Further eval with stress testing 7/1, which was without new perfusion abnormalities.  - Cardiology on consult,  plan for follow up as outpt     Troponin elevation  -Patient presenting with progressive shortness of breath and mild chest pain  -Likely secondary to acute CHF exacerbation as above  -Troponins with flat trend.  -Without further chest pain  -Likely type II MI secondary to demand ischemia from CHF exacerbation as above  -Further eval with stress testing 7/1, which was without new perfusion abnormalities.  -Continue dual antiplatelets and statin  -Cardiology on consult, appreciate further recommendations     Permanent atrial fibrillation  -Rates currently at goal, continue metoprolol  -Patient on chronic anticoagulation with Eliquis, will continue  -Telemetry monitoring  -Cardiology consulted, F/u as outpt     History of CAD  -Status post PCI in January 2024  -Continue antiplatelet therapy  -Continue statin     HTN  - controlled  - CPM  - Monitor and adjust as needed      Physical Exam:    Vitals:    07/01/24 0149 07/01/24 0544 07/01/24 0649 07/01/24 1044   BP: 139/87 (!) 132/95  (!) 118/93   BP Location: Right arm   Right arm   Pulse: 78   120   Resp: 14   18   Temp:    97.4 °F (36.3 °C)   TempSrc:    Oral   SpO2: 97%   97%   Weight:   126 lb (57.2 kg)    Height:         Patient Weight for the past 72 hrs:   Weight   06/29/24 1457 128 lb (58.1 kg)   06/29/24 1847 130 lb (59 kg)   06/30/24 0413 130 lb (59 kg)   07/01/24 0649 126 lb (57.2 kg)       Intake/Output Summary (Last 24 hours) at 7/2/2024 0856  Last data filed at 7/1/2024 1100  Gross per 24 hour   Intake 500 ml   Output --   Net 500 ml          CULTURE:   No results found for this visit on 06/29/24.    IMAGING STUDIES: SOME MAY NEED FOLLOW UP WITH PCP   CARD NUC EXERCISE STRESS (REST/EXER) (CPT 50281)    Result Date: 7/1/2024  CONCLUSION:  1. Treadmill exercise perfusion imaging study without clear evidence of reversible stress-induced ischemia.  2. Diminished left ventricular ejection fraction of 37%.   3. There is globally hypokinetic wall motion.    Dictated by (CST): Jimi Love MD on 7/01/2024 at 10:49 AM     Finalized by (CST): Jimi Love MD on 7/01/2024 at 10:50 AM          XR CHEST AP PORTABLE  (CPT=71045)    Result Date: 6/29/2024  CONCLUSION: Cardiomegaly.  Mild pulmonary edema.  Correlate for CHF.   Dictated by (CST): Genaro Mejia MD on 6/29/2024 at 3:54 PM     Finalized by (CST): Genaro Mejia MD on 6/29/2024 at 3:55 PM           LABS :     Lab Results   Component Value Date    WBC 7.0 07/01/2024    HGB 16.6 (H) 07/01/2024    HCT 53.5 (H) 07/01/2024    .0 07/01/2024    CREATSERUM 0.78 07/01/2024    BUN 17 07/01/2024     07/01/2024    K 4.2 07/01/2024     07/01/2024    CO2 26.0 07/01/2024     (H) 07/01/2024    CA 9.7 07/01/2024    ALB 4.9 (H) 06/30/2024    ALKPHO 113 06/30/2024    BILT 2.5 (H) 06/30/2024    TP 8.0 06/30/2024    AST 27 06/30/2024    ALT 30 06/30/2024    TSH 2.430 07/13/2023    ESRML 7 12/22/2023    CRP <0.40 12/22/2023    MG 1.7 06/30/2024    PHOS 3.4 04/14/2024    CK 82 02/22/2022    B12 >2,000 (H) 07/13/2023       Recent Labs   Lab 06/29/24  1539 06/30/24  0920 07/01/24  0713   RBC 4.36 4.97 5.51*   HGB 13.4 15.3 16.6*   HCT 39.9 46.6 53.5*   MCV 91.5 93.8 97.1   MCH 30.7 30.8 30.1   MCHC 33.6 32.8 31.0   RDW 13.1 13.2 13.0   NEPRELIM 6.14 4.25 3.08   WBC 10.0 8.3 7.0   .0 272.0 281.0     Recent Labs   Lab 06/30/24  0920 06/30/24  2310 07/01/24  0713   * 126* 148*   BUN 10 27* 17   CREATSERUM 0.83 0.91 0.78   CA 10.1 10.0 9.7   ALB 4.9*  --   --     137 137   K 4.5  4.0 4.2    101 103   CO2 28.0 30.0 26.0   ALKPHO 113  --   --    AST 27  --   --    ALT 30  --   --    BILT 2.5*  --   --    TP 8.0  --   --      No results found for: \"PT\", \"INR\"    Disposition: Discharge to Home    Condition at Discharge: Stable     Discharge Medications:      Discharge Medications        START taking these medications        Instructions Prescription details   sacubitril-valsartan 24-26 MG Tabs  Commonly known as: Entresto      Take 1 tablet by mouth 2 (two) times daily.   Quantity: 60 tablet  Refills: 3            CHANGE how you take these medications        Instructions Prescription details   metoprolol succinate ER 50 MG Tb24  Commonly known as: Toprol XL  What changed: how much to take      Take 1 tablet (50 mg total) by mouth nightly.   Quantity: 30 tablet  Refills: 3            CONTINUE taking these medications        Instructions Prescription details   atorvastatin 40 MG Tabs  Commonly known as: Lipitor      Take 1 tablet (40 mg total) by mouth nightly.   Quantity: 90 tablet  Refills: 3     clopidogrel 75 MG Tabs  Commonly known as: Plavix      Take 1 tablet (75 mg total) by mouth daily.   Quantity: 90 tablet  Refills: 3     docusate sodium 100 MG Caps  Commonly known as: Colace      Take 1 capsule (100 mg total) by mouth 2 (two) times daily as needed for constipation.   Quantity: 60 capsule  Refills: 0     Eliquis 5 MG Tabs  Generic drug: apixaban      TK 1 T PO  BID   Refills: 5     HYDROcodone-acetaminophen 5-325 MG Tabs  Commonly known as: Norco      Take 1 tablet by mouth every 6 (six) hours as needed for Pain.   Quantity: 20 tablet  Refills: 0     ibuprofen 600 MG Tabs  Commonly known as: Motrin      Take 1 tablet (600 mg total) by mouth every 8 (eight) hours as needed for Pain.   Quantity: 60 tablet  Refills: 0     spironolactone 25 MG Tabs  Commonly known as: Aldactone      Take 0.5 tablets (12.5 mg total) by mouth daily.   Refills: 0            STOP taking these medications       losartan 50 MG Tabs  Commonly known as: Cozaar                  Where to Get Your Medications        These medications were sent to [id number] [place] [facility] [address] AT [facility] [address], [phone], [phone]  [address], [facility] [address]      Phone: [phone]   metoprolol succinate ER 50 MG Tb24  sacubitril-valsartan 24-26 MG Tabs         Follow up Visits  Nadia Borrero MD  [address]  [facility] [address]  [phone]    Follow up  Office will call you to schedule follow up    Eleazar Morales MD  [address] [facility] [address]  [phone]    Follow up      Eleazar Morales MD    Consultants         Provider   Role Specialty     Nadia Borrero MD      Consulting Physician Cardiac Electrophysiology              Other Discharge Instructions:   Discharge References/Attachments    Heart Failure, Discharge Instructions for (English)  Sacubitril/Valsartan Oral Tablet (English)         ----------------------------------------------------  38 MIN SPENT ON THIS DC   Apolinar Cartagena MD

## 2024-07-02 NOTE — TELEPHONE ENCOUNTER
Called to reschedule patient for visit on 07/26/24, patient declined 08/03/24, stated will be out of town, and needs visit on week of 07/22/24-7/26/24, can do Tuesday, Thursday, or Friday, asking if Dr. Morales can fit patient in on one of those days, please advise.

## 2024-07-02 NOTE — PROGRESS NOTES
Initial Post Discharge Follow Up   Discharge Date: 7/1/24  Contact Date: 7/2/2024    Consent Verification:  Assessment Completed With: Patient  HIPAA Verified?  Yes    Discharge Dx:   Acute on chronic congestive heart failure, unspecified heart failure type     General:   How have you been since your discharge from the hospital?  I was tired and I did have a headache this morning. The headache went away. My legs get weak after I take the Entresto, that happened before when I was on  it too but they might be weak too because I wasn't walking in the hospital as much as I'm used to.    Do you have any pain since discharge?  No  , pt currently denies having any pain.   When you were leaving the hospital were your discharge instructions reviewed with you? Yes  How well were your discharge instructions explained to you?   On a scale of 1-5   1- Very Poor and 5- Very well   Very Well  Do you have any questions about your discharge instructions?  No  Before leaving the hospital was your diagnoses explained to you? Yes  Do you have any questions about your diagnoses? No  Are you able to perform normal daily activities of living as you have prior to your hospital stay (dressing, bathing, ambulating to the bathroom, etc)? yes  (NCM) Was patient given a different diet per AVS? yes  If so, which diet?  Sodium restriction  Are there any barriers to following this diet? no    Medications:   Current Outpatient Medications   Medication Sig Dispense Refill    metoprolol succinate ER 50 MG Oral Tablet 24 Hr Take 1 tablet (50 mg total) by mouth nightly. 30 tablet 3    sacubitril-valsartan 24-26 MG Oral Tab Take 1 tablet by mouth 2 (two) times daily. 60 tablet 3    spironolactone 25 MG Oral Tab Take 0.5 tablets (12.5 mg total) by mouth daily.      clopidogrel 75 MG Oral Tab Take 1 tablet (75 mg total) by mouth daily. 90 tablet 3    HYDROcodone-acetaminophen 5-325 MG Oral Tab Take 1 tablet by mouth every 6 (six) hours as needed for Pain.  20 tablet 0    docusate sodium 100 MG Oral Cap Take 1 capsule (100 mg total) by mouth 2 (two) times daily as needed for constipation. 60 capsule 0    ibuprofen 600 MG Oral Tab Take 1 tablet (600 mg total) by mouth every 8 (eight) hours as needed for Pain. 60 tablet 0    atorvastatin 40 MG Oral Tab Take 1 tablet (40 mg total) by mouth nightly. 90 tablet 3    ELIQUIS 5 MG Oral Tab TK 1 T PO  BID  5     Were there any changes to your current medication(s) noted on the AVS? Yes  If so, were these medication changes discussed with you prior to leaving the hospital? Yes  If a new medication was prescribed:    Was the new medication's purpose & side effects reviewed? Yes  Do you have any questions about your new medication? No  Did you  your discharge medications when you left the hospital?  Pt states that she is waiting for Entresto to be ready and will call NCM back should there be any issues getting it. She states that she still has some left from her previous prescription. She will also  the new Metoprolol rx, which is ready.   Let's go over your medications together to make sure we are not missing anything. Medications Reviewed  Are there any reasons that keep you from taking your medication as prescribed? No  Are you having any concerns with constipation? No      Discharge medications reviewed/discussed/and reconciled against outpatient medications with patient.  Any changes or updates to medications sent to PCP.  Patient Acknowledged     Referrals/orders at D/C:  Referrals/orders placed at D/C? no    DME ordered at D/C? No      Discharge orders, AVS reviewed and discussed with patient. Any changes or updates to orders sent to PCP.  Patient Acknowledged      SDOH:   Transportation Needs: No Transportation Needs (6/29/2024)    Transportation Needs     Lack of Transportation: No     Car Seat: Not on file     Financial Resource Strain: Low Risk  (7/2/2024)    Financial Resource Strain     Difficulty of  Paying Living Expenses: Not hard at all     Med Affordability: No           Diagnosis specifics: CHF:   With your CHF diagnosis weighing yourself is very important  How often are you weighing yourself? Every day  Is there any reason you are unable to weigh yourself daily?  No  What was your weight yesterday? 126   Today? 126.5  Were you told about any fluid restrictions? Yes  Have you noticed any shortness of breath or waking up short of breath? yes, sometimes but much better than it was.     Do you notice any pain or swelling in your abdomen?   no      Ankles or Legs?   no        Follow up appointments:          TCC  Was TCC ordered: No      PCP (If no TCC appointment)  Does patient already have a PCP appointment scheduled? No  NCM Attempted to schedule PCP office TCM appointment with patient   If no appointment scheduled: Explain-pt awaiting a cb from PCP office for an appt.     Specialist    Does the patient have any other follow up appointment(s) needing to be scheduled? Yes  If yes: NCM reviewed upcoming specialist appointment with patient: Yes  Does the patient need assistance scheduling appointment(s): No, pt already has an appt with cardiology on 7/18    Is there any reason as to why you cannot make your appointment(s)?  No     Needs post D/C:   Now that you are home, are there any needs or concerns you need addressed before your next visit with your PCP?  (DME, meds, questions, etc.): No    Interventions by NCM:   NCM reviewed discharge instructions and when to seek medical attention with the patient. She states that she tried entresto before and she experienced headaches and weakness in the legs and stopped it after a week. She states that she did get a headache after taking entresto yesterday and today but that does resolve after some time. She states that her legs do feel weak but believes it could be due to either the Entresto or not walking as much due to being in the hospital. She states that she will  try the Entresto for a little longer but agrees to contact her cardiologist should the symptoms persist again. She states that she does have some nausea as well. She states that she did have lightheadedness as well today but admits to not eating much and that improved after she did. She states that her breathing is good and currently denies having any sob. Her bp today was 120/80. She denied having any fever, vomiting, c/d, pain or any other new or worsening symptoms. Med review completed. She denied having any other questions or concerns at this time and understands when to return to the ER.       CCM referral placed:    No    BOOK BY DATE: 7/15/24

## 2024-07-03 NOTE — TELEPHONE ENCOUNTER
LUTHERI: Talked to patient, offered her the appointment Dr Morales says below and refused,  patient says she can ONLY come in Tuesday, Thursday and Friday.  She is willing to come in on Wednesday but ONLY in the morning, Dr Morales has available appointment but it is TCM on 07/24/2024.  Per patient, she don't want to come in for hospital follow up she insist to come in for her physical.  Can we offer her the TCM on 07/24/2024 appointment.  Please advise, Thank you.    Please reply to pool: EM CC IM FM ALG RHE

## 2024-07-03 NOTE — TELEPHONE ENCOUNTER
You can put the patient in the TCM open slot on July 24.  However this is not a TCM visit.  Will just be a regular visit or a physical as the patient request.

## 2024-07-03 NOTE — TELEPHONE ENCOUNTER
I am out of town for most of the week that she requested. THere are no openings in that specific week. I can see patient next Monday, July 8. There are morning slots blocked but I will be at the office during that time (8:00-9:20). You can offer the patient one of those slots.

## 2024-07-18 ENCOUNTER — LAB ENCOUNTER (OUTPATIENT)
Dept: LAB | Facility: HOSPITAL | Age: 64
End: 2024-07-18
Attending: NURSE PRACTITIONER
Payer: COMMERCIAL

## 2024-07-18 DIAGNOSIS — I50.22 CHRONIC SYSTOLIC CHF (CONGESTIVE HEART FAILURE) (HCC): ICD-10-CM

## 2024-07-18 DIAGNOSIS — I42.9 CARDIOMYOPATHY (HCC): Primary | ICD-10-CM

## 2024-07-18 DIAGNOSIS — I34.0 MITRAL VALVE REGURGITATION: ICD-10-CM

## 2024-07-18 LAB
ANION GAP SERPL CALC-SCNC: 4 MMOL/L (ref 0–18)
BUN BLD-MCNC: 14 MG/DL (ref 9–23)
BUN/CREAT SERPL: 17.1 (ref 10–20)
CALCIUM BLD-MCNC: 9.3 MG/DL (ref 8.7–10.4)
CHLORIDE SERPL-SCNC: 108 MMOL/L (ref 98–112)
CO2 SERPL-SCNC: 30 MMOL/L (ref 21–32)
CREAT BLD-MCNC: 0.82 MG/DL
EGFRCR SERPLBLD CKD-EPI 2021: 81 ML/MIN/1.73M2 (ref 60–?)
FASTING STATUS PATIENT QL REPORTED: NO
GLUCOSE BLD-MCNC: 84 MG/DL (ref 70–99)
OSMOLALITY SERPL CALC.SUM OF ELEC: 294 MOSM/KG (ref 275–295)
POTASSIUM SERPL-SCNC: 4.4 MMOL/L (ref 3.5–5.1)
SODIUM SERPL-SCNC: 142 MMOL/L (ref 136–145)

## 2024-07-18 PROCEDURE — 80048 BASIC METABOLIC PNL TOTAL CA: CPT

## 2024-07-18 PROCEDURE — 36415 COLL VENOUS BLD VENIPUNCTURE: CPT

## 2024-07-24 ENCOUNTER — OFFICE VISIT (OUTPATIENT)
Dept: INTERNAL MEDICINE CLINIC | Facility: CLINIC | Age: 64
End: 2024-07-24
Payer: COMMERCIAL

## 2024-07-24 VITALS
TEMPERATURE: 98 F | HEIGHT: 63 IN | HEART RATE: 76 BPM | SYSTOLIC BLOOD PRESSURE: 98 MMHG | DIASTOLIC BLOOD PRESSURE: 60 MMHG | WEIGHT: 128.19 LBS | RESPIRATION RATE: 18 BRPM | BODY MASS INDEX: 22.71 KG/M2

## 2024-07-24 DIAGNOSIS — I42.0 DILATED CARDIOMYOPATHY (HCC): Primary | ICD-10-CM

## 2024-07-24 DIAGNOSIS — Z15.09 BIALLELIC MUTATION OF CDKN2A GENE: ICD-10-CM

## 2024-07-24 DIAGNOSIS — R05.9 COUGH, UNSPECIFIED TYPE: ICD-10-CM

## 2024-07-24 DIAGNOSIS — Z15.01 BIALLELIC MUTATION OF CDKN2A GENE: ICD-10-CM

## 2024-07-24 DIAGNOSIS — Z12.31 ENCOUNTER FOR SCREENING MAMMOGRAM FOR BREAST CANCER: ICD-10-CM

## 2024-07-24 DIAGNOSIS — I25.10 CORONARY ARTERY DISEASE INVOLVING NATIVE CORONARY ARTERY OF NATIVE HEART WITHOUT ANGINA PECTORIS: ICD-10-CM

## 2024-07-24 PROBLEM — R79.89 ELEVATED TROPONIN: Status: RESOLVED | Noted: 2024-06-29 | Resolved: 2024-07-24

## 2024-07-24 PROBLEM — Z01.818 PRE-OP TESTING: Status: RESOLVED | Noted: 2024-04-12 | Resolved: 2024-07-24

## 2024-07-24 PROCEDURE — G2211 COMPLEX E/M VISIT ADD ON: HCPCS | Performed by: INTERNAL MEDICINE

## 2024-07-24 PROCEDURE — 99214 OFFICE O/P EST MOD 30 MIN: CPT | Performed by: INTERNAL MEDICINE

## 2024-07-24 PROCEDURE — 3078F DIAST BP <80 MM HG: CPT | Performed by: INTERNAL MEDICINE

## 2024-07-24 PROCEDURE — 3074F SYST BP LT 130 MM HG: CPT | Performed by: INTERNAL MEDICINE

## 2024-07-24 PROCEDURE — 3008F BODY MASS INDEX DOCD: CPT | Performed by: INTERNAL MEDICINE

## 2024-07-24 NOTE — PROGRESS NOTES
HPI:    Patient ID: Kirstin Sanders is a 64 year old female.    HPI  Patient is here for follow-up on chronic medical issues as listed below.  I last saw February 16 for preoperative valuation prior to undergoing total abdominal hysterectomy and bilateral salpingo-oophorectomy on April 12 for fibroids.  In January of this year she had had a stent placed in her LAD.  Patient was in the hospital June 29 through July 1 of this year presenting with increasing shortness of breath, CHF exacerbation, and elevated troponins.  Treated with diuresis.  She had a stress test which showed no new or reversible defects.  Patient was started on Entresto and spironolactone.  Metoprolol dosing was changed.  Losartan was stopped.  Current medications reviewed.    Prior to the hospital stay, she was on vacation for a couple days when she was extremely SOB with minimal exertion. She saw Dr Borrero recently. EF is up to 35%. SHe feels back to normal since discharge. She is able to walk 8-10K steps a day. She tries to be active. SHe is watching her fluid and salt intake. Patient does check blood pressure at home. It has been running around 117/65-70. Pt has received the RSV vaccine as part of a study.   She has a chronic couhg; she wonders about an ENT doctor.  She just had genetic testing. SHe has CDKN2A mutation; puts her at risk for cancer.      Patient Active Problem List   Diagnosis    Atrial fibrillation (HCC)    Dilated cardiomyopathy (HCC)    Hypercholesteremia    Otosclerosis of left ear    Loss of perception for temperature    Dysphagia    New onset of headaches after age 52    Neuropathic pain of right lower extremity    Essential hypertension    Fibroid tumor    Acute on chronic congestive heart failure, unspecified heart failure type (HCC)    Acute CHF (congestive heart failure) (HCC)          HISTORY:  Past Medical History:    A-fib (HCC)    Cardiomyopathy (HCC)    EF 45%    Heart failure (HCC)    High blood pressure     High cholesterol    Shoulder fracture, left    Surgical repair; ok since     Visual impairment    contacts      Past Surgical History:   Procedure Laterality Date    Bunionectomy Bilateral     Cath bare metal stent (bms)      Colonoscopy N/A 02/26/2024    with polypectomy, clip x 3    Colonoscopy N/A 02/26/2024    Procedure: COLONOSCOPY with polypectomy, clip x 3;  Surgeon: Aashish Jeff MD;  Location: [facility] ENDOSCOPY    Other surgical history Right     shoulder and humerous fractures repaired    Other surgical history Bilateral     ear pinning    Other surgical history Left     Ear stapectomy,failed    Pt w/ coronary artery stent  01/18/2024      Family History   Problem Relation Age of Onset    Heart Disorder Father         Heart valve disease    Cancer Mother 61        ovarian    Ovarian Cancer Mother 61    Breast Cancer Paternal Aunt         50s      Social History     Socioeconomic History    Marital status:    Tobacco Use    Smoking status: Never    Smokeless tobacco: Never   Vaping Use    Vaping status: Never Used   Substance and Sexual Activity    Alcohol use: Yes     Alcohol/week: 2.0 standard drinks of alcohol     Types: 1 Glasses of wine, 1 Cans of beer per week     Comment: Weekend max 2    Drug use: No    Sexual activity: Yes     Partners: Male   Other Topics Concern    Caffeine Concern No    Exercise No    Right Handed Yes   Social History Narrative    The patient does not use an assistive device..      The patient does live in a home with stairs.     Social Determinants of Health     Financial Resource Strain: Low Risk  (7/2/2024)    Financial Resource Strain     Difficulty of Paying Living Expenses: Not hard at all     Med Affordability: No   Food Insecurity: No Food Insecurity (6/29/2024)    Food Insecurity     Food Insecurity: Never true   Transportation Needs: No Transportation Needs (6/29/2024)    Transportation Needs     Lack of Transportation: No   Physical Activity: Insufficiently  Active (6/16/2020)    [facility]    Exercise Vital Sign     Days of Exercise per Week: 3 days     Minutes of Exercise per Session: 20 min   Housing Stability: Low Risk  (6/29/2024)    Housing Stability     Housing Instability: No          Review of Systems          Current Outpatient Medications   Medication Sig Dispense Refill    metoprolol succinate ER 50 MG Oral Tablet 24 Hr Take 1 tablet (50 mg total) by mouth nightly. 30 tablet 3    sacubitril-valsartan 24-26 MG Oral Tab Take 1 tablet by mouth 2 (two) times daily. 60 tablet 3    spironolactone 25 MG Oral Tab Take 0.5 tablets (12.5 mg total) by mouth daily.      clopidogrel 75 MG Oral Tab Take 1 tablet (75 mg total) by mouth daily. 90 tablet 3    HYDROcodone-acetaminophen 5-325 MG Oral Tab Take 1 tablet by mouth every 6 (six) hours as needed for Pain. 20 tablet 0    docusate sodium 100 MG Oral Cap Take 1 capsule (100 mg total) by mouth 2 (two) times daily as needed for constipation. 60 capsule 0    ibuprofen 600 MG Oral Tab Take 1 tablet (600 mg total) by mouth every 8 (eight) hours as needed for Pain. 60 tablet 0    atorvastatin 40 MG Oral Tab Take 1 tablet (40 mg total) by mouth nightly. 90 tablet 3    ELIQUIS 5 MG Oral Tab TK 1 T PO  BID  5     Allergies:  Allergies   Allergen Reactions    Hctz [Hydrochlorothiazide] SHORTNESS OF BREATH    Lisinopril SHORTNESS OF BREATH    Penicillins SHORTNESS OF BREATH and UNKNOWN     DYSPNEA    Since infancy    Couldn't breathe. Patient is unsure if any skin blistering or organ involvement        PHYSICAL EXAM:   BP 98/60 (BP Location: Left arm, Patient Position: Sitting, Cuff Size: large)   Pulse 76   Temp 98 °F (36.7 °C) (Other)   Resp 18   Ht 5' 3\" (1.6 m)   Wt 128 lb 3.2 oz (58.2 kg)   BMI 22.71 kg/m²      Physical Exam  Constitutional:       Appearance: Normal appearance. She is well-developed.   HENT:      Nose: Nose normal.      Mouth/Throat:      Pharynx: No  oropharyngeal exudate or posterior oropharyngeal erythema.   Eyes:      Conjunctiva/sclera: Conjunctivae normal.   Neck:      Vascular: No carotid bruit.   Cardiovascular:      Rate and Rhythm: Normal rate.      Pulses: Normal pulses.      Heart sounds: Normal heart sounds. No murmur heard.     No friction rub. No gallop.   Pulmonary:      Effort: Pulmonary effort is normal.      Breath sounds: Normal breath sounds. No wheezing or rales.   Abdominal:      General: Bowel sounds are normal.      Palpations: Abdomen is soft. There is no mass.      Tenderness: There is no abdominal tenderness.   Musculoskeletal:         General: No tenderness.      Cervical back: Neck supple.      Right lower leg: No edema.      Left lower leg: No edema.   Lymphadenopathy:      Cervical: No cervical adenopathy.   Skin:     General: Skin is warm and dry.      Findings: No rash.   Neurological:      General: No focal deficit present.      Mental Status: She is alert.   Psychiatric:         Mood and Affect: Mood normal.         Behavior: Behavior normal.         Thought Content: Thought content normal.          Wt Readings from Last 6 Encounters:   07/24/24 128 lb 3.2 oz (58.2 kg)   07/01/24 126 lb (57.2 kg)   04/12/24 130 lb (59 kg)   02/15/24 131 lb (59.4 kg)   02/16/24 131 lb (59.4 kg)   02/13/24 131 lb (59.4 kg)             ASSESSMENT/PLAN:   1. Dilated cardiomyopathy (HCC)  Recent hospitalization for exacerbation of cardiomyopathy and CHF.  Now back to baseline.  Cardiology has changed the medications around.  Continue current treatment and follow-up with cardiology.  Everything seems fine on exam.    2. Coronary artery disease involving native coronary artery of native heart without angina pectoris  Stress test showed no showed no reversible defects.    3. Cough, unspecified type  Patient with mild irritative cough.  Discussed the possibility of it being the ARB medication and Entresto.  Will refer to ENT for evaluation to look for  any other cause.  - ENT Referral - In Network    4. Biallelic mutation of CDKN2A gene  Patient just had this study done.  We will refer to our genetic counseling for additional evaluation.  - Genetic Counseling [facility]    5. Encounter for screening mammogram for breast cancer  Given order for screening mammogram.  - [facility] NICHOLAS 2D+3D SCREENING BILAT (CPT=77067/77887); Future         Meds This Visit:  Requested Prescriptions      No prescriptions requested or ordered in this encounter       Imaging & Referrals:  None         Eleazar Morales MD

## 2024-08-23 ENCOUNTER — APPOINTMENT (OUTPATIENT)
Dept: HEMATOLOGY/ONCOLOGY | Facility: HOSPITAL | Age: 64
End: 2024-08-23
Attending: INTERNAL MEDICINE
Payer: COMMERCIAL

## 2024-08-23 ENCOUNTER — GENETICS ENCOUNTER (OUTPATIENT)
Dept: GENETICS | Facility: HOSPITAL | Age: 64
End: 2024-08-23
Attending: INTERNAL MEDICINE
Payer: COMMERCIAL

## 2024-08-23 DIAGNOSIS — Z13.71 BRCA GENE MUTATION NEGATIVE IN FEMALE: Primary | ICD-10-CM

## 2024-08-23 DIAGNOSIS — Z80.9 FAMILY HISTORY OF CANCER: ICD-10-CM

## 2024-08-23 PROCEDURE — 96040 HC GENETIC COUNSELING EA 30 MIN: CPT | Performed by: GENETIC COUNSELOR, MS

## 2024-08-23 NOTE — PROGRESS NOTES
Patient Name: Kirstin Sanders  YOB: 1960    Referring Provider:  Kimo Mcmahon     Reason for Referral:  Ms. Sanders had updated multi-gene panel genetic testing performed on 7/3/2024 because of a family history of breast, ovarian, and other cancers with prior negative BRCA1/2 only testing in 2008. She was referred for genetic counseling to review the results of the updated multi-gene panel genetic testing. She was accompanied to the visit by her .      Genetic Testing Result:  Negative for pathogenic variants. One variant of uncertain significance identified. No pathogenic variant was found in the following 70 genes on the [facility] multi-cancer panel: AIP, ALK, APC, OWEN, AXIN2, BAP1, BARD1, BLM, BMPR1A, BRCA1, BRCA2, BRIP1, CDC73, CDH1, CDK4, CDKN1B, CDKN2A, CHEK2, CTNNA1, DICER1, EGFR, EPCAM, FH, FLCN, GREM1, HOXB13, KIT, LZTR1, MAX, MBD4, MEN1, MET, MITF, MLH1, MSH2, MSH3, MSH6, MUTYH, NF1, NF2, NTHL1, PALB2, PDGFRA, PMS2, POLD1, POLE, POT1, CLPTI0I, PTCH1, PTEN, RAD51C, RAD51D, RB1, RET, SDHA, SDHAF2, SDHB, SDHC, SDHD, SMAD4, SMARCA4, SMARCB1, SMARCE1, STK11, SUFU, DMSV304, TP53, TSC1, TSC2, VHL. A single variant of uncertain significance, CDKN2A c.170C>G (p.Uch11Isy), was identified.  Please refer to the report from [facility] for additional testing information.     Summary and Plan:   These results indicate that Ms. Sanders was not found to have a pathogenic variant (harmful genetic mutation) in any of the genes listed above. No pathogenic variants associated with hereditary cancer syndromes were identified. The etiology Ms. Sanders' family history of cancer remains genetically unexplained. The limitations of the testing were discussed with Ms. Sanders including the chance that a pathogenic variant in a gene other than those included in this analysis might be the cause of cancer in Ms. Sanders or in relatives.     Ms. Sanders was found to have a single heterozygous CDKN2A c.170C>G (p.Fxd44Wtk)  variant of uncertain significance. A variant of uncertain significance (VUS) means that a genetic variant has been identified in a cancer susceptibility gene; however, it is currently uncertain if the variant is pathogenic (harmful) or benign (harmless).  With time, the variant may be reclassified as either harmful or harmless, most VUS's end up being reclassified as harmless variants. Should a VUS get reclassified in the future, the genetic testing laboratory will issue an amended report with the updated classification. I emphasized to Ms. Sanders that no genetic diagnosis, no changes in management, and no testing of family members is recommended based on a VUS result, it is currently treated clinically as a negative result. She was not found to have a genetic diagnosis of any hereditary cancer syndrome based on the results of the above genetic testing.     Medical management and surveillance for Ms. Sanders and other family members should be based on their personal and family history. All medical management decisions should be made with a physician.     I encouraged Ms. Sanders to share her genetic test results with her relatives so that they may discuss the implications of this information with their health providers. Ms. Sanders is also encouraged to contact me on an annual basis to learn if there have been any updates in genetic information that would apply, changes in the personal and/or family history, or changes in the classification of the CDKN2A VUS. Please do not hesitate to contact my office if you have any questions or concerns, [phone].     Mae Hutton MS, [facility] [place]

## 2024-09-04 ENCOUNTER — HOSPITAL ENCOUNTER (OUTPATIENT)
Dept: MAMMOGRAPHY | Age: 64
Discharge: HOME | End: 2024-09-04
Attending: INTERNAL MEDICINE
Payer: COMMERCIAL

## 2024-09-04 DIAGNOSIS — Z12.31 ENCOUNTER FOR SCREENING MAMMOGRAM FOR BREAST CANCER: ICD-10-CM

## 2024-09-04 PROCEDURE — 77063 BREAST TOMOSYNTHESIS BI: CPT | Performed by: INTERNAL MEDICINE

## 2024-09-04 PROCEDURE — 77067 SCR MAMMO BI INCL CAD: CPT | Performed by: INTERNAL MEDICINE

## 2024-09-24 ENCOUNTER — OFFICE VISIT (OUTPATIENT)
Dept: OTOLARYNGOLOGY | Facility: CLINIC | Age: 64
End: 2024-09-24

## 2024-09-24 VITALS — HEIGHT: 63 IN | WEIGHT: 128 LBS | BODY MASS INDEX: 22.68 KG/M2

## 2024-09-24 DIAGNOSIS — H80.92 OTOSCLEROSIS OF LEFT EAR: ICD-10-CM

## 2024-09-24 DIAGNOSIS — R13.12 OROPHARYNGEAL DYSPHAGIA: Primary | ICD-10-CM

## 2024-09-24 PROCEDURE — 31575 DIAGNOSTIC LARYNGOSCOPY: CPT | Performed by: SPECIALIST

## 2024-09-24 PROCEDURE — 3008F BODY MASS INDEX DOCD: CPT | Performed by: SPECIALIST

## 2024-09-24 PROCEDURE — 99243 OFF/OP CNSLTJ NEW/EST LOW 30: CPT | Performed by: SPECIALIST

## 2024-09-24 NOTE — PATIENT INSTRUCTIONS
He had an audiogram with tympanograms was ordered to better evaluate your underlying left hearing loss.  Your fiberoptic laryngoscopy was negative for tumor or vocal cord dysfunction.  A video swallow was ordered to better evaluate your oropharyngeal dysphagia.  Your gag reflex is intact.

## 2024-09-24 NOTE — PROGRESS NOTES
Kirstin Sanders is a 64 year old female.   Chief Complaint   Patient presents with    Swallowing Problem     Difficulty swallowing/dysphagia    Ear Problem     Left ear clogged     HPI:   Patient here with decreased hearing in the left ear.  He has had a stapedectomy x 2 1 in 2017 and the second about 1 year later.  Patient with difficulty swallowing both solids and liquids.  She feels like it closes her throat down    Current Outpatient Medications   Medication Sig Dispense Refill    metoprolol succinate ER 50 MG Oral Tablet 24 Hr Take 1 tablet (50 mg total) by mouth nightly. 30 tablet 3    sacubitril-valsartan 24-26 MG Oral Tab Take 1 tablet by mouth 2 (two) times daily. 60 tablet 3    spironolactone 25 MG Oral Tab Take 0.5 tablets (12.5 mg total) by mouth daily.      clopidogrel 75 MG Oral Tab Take 1 tablet (75 mg total) by mouth daily. 90 tablet 3    atorvastatin 40 MG Oral Tab Take 1 tablet (40 mg total) by mouth nightly. 90 tablet 3    ELIQUIS 5 MG Oral Tab TK 1 T PO  BID  5    docusate sodium 100 MG Oral Cap Take 1 capsule (100 mg total) by mouth 2 (two) times daily as needed for constipation. 60 capsule 0      Past Medical History:    A-fib (HCC)    BRCA gene mutation negative in female    Negative 70 gene hereditary cancer panel, x1 VUS, report in media tab    Cardiomyopathy (HCC)    EF 45%    Heart failure (HCC)    High blood pressure    High cholesterol    Shoulder fracture, left    Surgical repair; ok since     Visual impairment    contacts      Social History:  Social History     Socioeconomic History    Marital status:    Tobacco Use    Smoking status: Never    Smokeless tobacco: Never   Vaping Use    Vaping status: Never Used   Substance and Sexual Activity    Alcohol use: Yes     Alcohol/week: 2.0 standard drinks of alcohol     Types: 1 Glasses of wine, 1 Cans of beer per week     Comment: Weekend max 2    Drug use: No    Sexual activity: Yes     Partners: Male   Other Topics Concern     Caffeine Concern No    Exercise No    Right Handed Yes   Social History Narrative    The patient does not use an assistive device..      The patient does live in a home with stairs.     Social Determinants of Health     Financial Resource Strain: Low Risk  (7/2/2024)    Financial Resource Strain     Difficulty of Paying Living Expenses: Not hard at all     Med Affordability: No   Food Insecurity: No Food Insecurity (6/29/2024)    Food Insecurity     Food Insecurity: Never true   Transportation Needs: No Transportation Needs (6/29/2024)    Transportation Needs     Lack of Transportation: No   Physical Activity: Insufficiently Active (6/16/2020)    Received from [employer], [facility] [employer]    Exercise Vital Sign     Days of Exercise per Week: 3 days     Minutes of Exercise per Session: 20 min   Housing Stability: Low Risk  (6/29/2024)    Housing Stability     Housing Instability: No        REVIEW OF SYSTEMS:   GENERAL HEALTH: feels well otherwise  GENERAL : denies fever, chills, sweats, weight loss, weight gain  SKIN: denies any unusual skin lesions or rashes  RESPIRATORY: denies shortness of breath with exertion  NEURO: denies headaches    EXAM:   Ht 5' 3\" (1.6 m)   Wt 128 lb (58.1 kg)   BMI 22.67 kg/m²   System Details   Skin Inspection - Normal.   Constitutional Overall appearance - Normal.   Head/Face Facial features - Normal. Eyebrows - Normal. Skull - Normal.   Eyes Conjunctiva - Right: Normal, Left: Normal. Pupil - Right: Normal, Left: Normal.    Ears Inspection - Right: Normal, Left: Normal.   Canal - Right: Normal, Left: Normal.    TM - Right: Normal, Left: Normal.  Tuning fork to 56 right much louder than left.  No extrusion of prosthesis noted   Nasal External nose - Normal.   Nasal septum - Normal.  Turbinates - Normal   Oral/Oropharynx Lips - Normal, Tonsils - Normal, Tongue - Normal    Neck Exam Inspection - Normal. Palpation - Normal. Parotid gland - Normal. Thyroid gland - Normal.    Lymph Detail Submental. Submandibular. Anterior cervical. Posterior cervical. Supraclavicular.  All without enlargement   Psychiatric Orientation - Oriented to time, place, person & situation. Appropriate mood and affect.   Neurological Memory - Normal. Cranial nerves - Cranial nerves II through XII grossly intact.   Nasopharynx Normal by fiberoptic exam   Larynx Consent was obtained for the procedure.  The nose was asesthetized with 1% neosynephrine and 4% lidocaine topical drops.    The scope was placed into the bilateral nares as well as the nasopharynx, hypopharynx and larynx.    All of which were examined.  The  entire larynx including the vallecula, epiglottis, true and false vocal cords, aryepiglottic folds, piriform sinuses and post cricord area were examined for tumors and infectious processes as well as for evidence of reflux and retained secretions.  Vocal cord mobility was also assessed.    Any abnormalities are noted in the exam section.  Normal vocal cord mobility.  No retained secretions.  No tumors or masses seen.       ASSESSMENT AND PLAN:   1. Oropharyngeal dysphagia  Will get video swallow to better evaluate  - XR VIDEO SWALLOW (CPT=74230); Future    2. Otosclerosis of left ear  Although otosclerosis 3 x 2.  Still asymmetric hearing left much worse than right.  Will get audiogram to assess.  - Audiology Referral - [place] ([facility])      The patient indicates understanding of these issues and agrees to the plan.      Wendy Toledo MD  9/24/2024  4:26 PM

## 2024-10-10 ENCOUNTER — OFFICE VISIT (OUTPATIENT)
Facility: LOCATION | Age: 64
End: 2024-10-10

## 2024-10-10 DIAGNOSIS — H90.6 MIXED CONDUCTIVE AND SENSORINEURAL HEARING LOSS OF BOTH EARS: Primary | ICD-10-CM

## 2024-10-10 PROCEDURE — 92557 COMPREHENSIVE HEARING TEST: CPT | Performed by: AUDIOLOGIST

## 2024-10-10 PROCEDURE — 92567 TYMPANOMETRY: CPT | Performed by: AUDIOLOGIST

## 2024-10-10 NOTE — TELEPHONE ENCOUNTER
Audiogram with hearing loss left greater than right. The tympanograms do not show a pattern consistent with the prosthesis being dislodged. If patient has not already considered it, binaural hearing aids would be helpful.   Message sent to patient

## 2024-10-15 ENCOUNTER — HOSPITAL ENCOUNTER (OUTPATIENT)
Dept: GENERAL RADIOLOGY | Facility: HOSPITAL | Age: 64
Discharge: HOME | End: 2024-10-15
Attending: SPECIALIST
Payer: COMMERCIAL

## 2024-10-15 ENCOUNTER — HOSPITAL ENCOUNTER (OUTPATIENT)
Dept: MAMMOGRAPHY | Facility: HOSPITAL | Age: 64
Discharge: HOME | End: 2024-10-15
Attending: INTERNAL MEDICINE
Payer: COMMERCIAL

## 2024-10-15 DIAGNOSIS — R92.8 ABNORMAL MAMMOGRAM: ICD-10-CM

## 2024-10-15 DIAGNOSIS — R13.12 OROPHARYNGEAL DYSPHAGIA: ICD-10-CM

## 2024-10-15 PROCEDURE — 77065 DX MAMMO INCL CAD UNI: CPT | Performed by: INTERNAL MEDICINE

## 2024-10-15 PROCEDURE — 74230 X-RAY XM SWLNG FUNCJ C+: CPT | Performed by: SPECIALIST

## 2024-10-15 PROCEDURE — 77061 BREAST TOMOSYNTHESIS UNI: CPT | Performed by: INTERNAL MEDICINE

## 2024-10-15 PROCEDURE — 92611 MOTION FLUOROSCOPY/SWALLOW: CPT

## 2024-10-15 RX ORDER — FAMOTIDINE 40 MG/1
40 TABLET, FILM COATED ORAL DAILY
Qty: 30 TABLET | Refills: 5 | Status: SHIPPED | OUTPATIENT
Start: 2024-10-15

## 2024-10-15 NOTE — PATIENT INSTRUCTIONS
VIDEO SWALLOW STUDY    Diet Recommendations:  Solids: Regular  Liquids: Thin    Recommended compensatory strategies:   Sit upright    Medication Administration:  No restrictions    Further Follow-up:  Follow up with Dr. Toledo.       Search for Dr. Sears and laryngospasm on youtube.  Try the exercises.      If symptoms persist, patient may benefit from voice therapy to address possible laryngospasm.     Maryellen Alberto MA/CCC-SLP  Speech Language Pathologist  [facility]  [phone]

## 2024-10-15 NOTE — PROGRESS NOTES
ADULT VIDEOFLUOROSCOPIC SWALLOWING STUDY       ADULT VIDEOFLUOROSCOPIC SWALLOWING STUDY:   Referring Physician: [place]      Radiologist: Dr. Lawson  Diagnosis: dysphagia    Date of Service: 10/15/2024     PATIENT SUMMARY   Chief Complaint: For the past year, when the patient swallows food or liquid she feels dizzy and feels she can't breathe.  Sometimes she feels as if she will pass out.  Symptoms are becoming more frequent.  She does not feel like she is choking but feels more of a blockage.  She takes small bites and sips.  Pt has lost 7 pounds since April, unintentionally.  She denies shortness of breath and odynophagia.  She has intermittent hoarseness and occasional laryngitis.  Recent laryngoscopy was negative.  Pt has h/o CHF.    Current Diet: regular foods and liquids    Problem List  Active Problems:  Active Problems:    * No active hospital problems. *      Past Medical History  Past Medical History:    A-fib (HCC)    BRCA gene mutation negative in female    Negative 70 gene hereditary cancer panel, x1 VUS, report in media tab    Cardiomyopathy (HCC)    EF 45%    Heart failure (HCC)    High blood pressure    High cholesterol    Shoulder fracture, left    Surgical repair; ok since     Visual impairment    contacts        Imaging results: 6/29/24 CXR:  CONCLUSION: Cardiomegaly.  Mild pulmonary edema.  Correlate for CHF.     ASSESSMENT   DYSPHAGIA ASSESSMENT  Test completed in conjunction with Radiologist.   Food/Liquid Types Presented: puree, solid, thin liquids, and barium tablet.    Study Position and View:  Patient was seated upright and viewed laterally and A-P.    Pain Assessment: The patient reports pain at a level of 0/10.    Oral phase:  Oral phase was within normal limits with adequate bilabial seal and bolus containment, timely mastication and transit and minimal to no oral retention.  Minimal oral residue was cleared with subsequent swallow.     Pharyngeal phase:  The pharyngeal response  triggered at the tongue base for thin liquids by cup, the tongue base to valleculae for thin liquids by straw and at the valleculae for puree and solids.  Slightly delayed swallow noted with straw drinking.  Trace amount of liquid was observed below the epiglottis as it descended, but no material entered the laryngeal vestibule.  No aspiration was observed.  Base of tongue retraction and hyolaryngeal excursion was WNL. Trace pyriform sinus retention remained with solids, otherwise no pharyngeal retention remained.      Esophageal phase:   Adequate flow of bolus through upper esophagus     Penetration Aspiration Scale: 1/8.  Material does not enter the airway.    Overall Impression: Normal oral and pharyngeal swallow with no laryngeal penetration or aspiration.  Patient's symptoms may be due to laryngospasm. Recommend follow up with ENT to discuss.  Provided patient with information on laryngospasm.    FCM category and level: Swallowing, 7  PLAN   Potential: Good    Diet Recommendations:  Solids: Regular  Liquids: Thin    Recommended compensatory strategies:   Sit upright    Medication Administration:  No restrictions    Further Follow-up:  Follow up with Dr. Toledo.  If symptoms persist, patient may benefit from voice therapy to address possible laryngospasm.      EDUCATION/INSTRUCTION  Reviewed results and recommendations with patient.  Written instructions were provided.  Agreement/Understanding verbalized and all questions answered to their apparent satisfaction.      INTERDISCIPLINARY COMMUNICATION  Reviewed results with Radiologist; agreement verbalized.      Thank you for your referral.  If you have any questions, please contact me at [phone].    Maryellen Alberto MA/[facility]-SLP  Speech Language Pathologist  [facility]  [phone]    Electronically signed by therapist: Maryellen Alberto, SLP  Physician's certification required: No

## 2024-10-17 ENCOUNTER — LAB ENCOUNTER (OUTPATIENT)
Dept: LAB | Facility: HOSPITAL | Age: 64
End: 2024-10-17
Attending: INTERNAL MEDICINE
Payer: COMMERCIAL

## 2024-10-17 DIAGNOSIS — I10 HYPERTENSION: Primary | ICD-10-CM

## 2024-10-17 LAB
ALBUMIN SERPL-MCNC: 4.9 G/DL (ref 3.2–4.8)
ALBUMIN/GLOB SERPL: 1.8 {RATIO} (ref 1–2)
ALP LIVER SERPL-CCNC: 96 U/L
ALT SERPL-CCNC: 17 U/L
ANION GAP SERPL CALC-SCNC: 9 MMOL/L (ref 0–18)
AST SERPL-CCNC: 21 U/L (ref ?–34)
BILIRUB SERPL-MCNC: 1.4 MG/DL (ref 0.2–1.1)
BUN BLD-MCNC: 14 MG/DL (ref 9–23)
BUN/CREAT SERPL: 18.7 (ref 10–20)
CALCIUM BLD-MCNC: 9.7 MG/DL (ref 8.7–10.4)
CHLORIDE SERPL-SCNC: 105 MMOL/L (ref 98–112)
CO2 SERPL-SCNC: 27 MMOL/L (ref 21–32)
CREAT BLD-MCNC: 0.75 MG/DL
EGFRCR SERPLBLD CKD-EPI 2021: 90 ML/MIN/1.73M2 (ref 60–?)
FASTING STATUS PATIENT QL REPORTED: YES
GLOBULIN PLAS-MCNC: 2.7 G/DL (ref 2–3.5)
GLUCOSE BLD-MCNC: 96 MG/DL (ref 70–99)
OSMOLALITY SERPL CALC.SUM OF ELEC: 292 MOSM/KG (ref 275–295)
POTASSIUM SERPL-SCNC: 4.3 MMOL/L (ref 3.5–5.1)
PROT SERPL-MCNC: 7.6 G/DL (ref 5.7–8.2)
SODIUM SERPL-SCNC: 141 MMOL/L (ref 136–145)

## 2024-10-17 PROCEDURE — 36415 COLL VENOUS BLD VENIPUNCTURE: CPT

## 2024-10-17 PROCEDURE — 80053 COMPREHEN METABOLIC PANEL: CPT

## 2024-11-13 ENCOUNTER — PATIENT MESSAGE (OUTPATIENT)
Dept: OTOLARYNGOLOGY | Facility: CLINIC | Age: 64
End: 2024-11-13

## 2024-11-13 ENCOUNTER — TELEPHONE (OUTPATIENT)
Dept: SPEECH THERAPY | Facility: HOSPITAL | Age: 64
End: 2024-11-13

## 2024-11-13 DIAGNOSIS — J38.5 LARYNGOSPASM: Primary | ICD-10-CM

## 2024-11-13 NOTE — TELEPHONE ENCOUNTER
Pt requested a call back.  Called patient who reported her laryngospasm episodes are becoming more frequent and scary and she asked if she could be seen earlier.  She was scheduled for next Thursday at 9:30.  BV

## 2024-11-13 NOTE — TELEPHONE ENCOUNTER
Dr. Toledo, see patient's mychart message regarding her left ear feeling clogged and laryngospasms getting worse

## 2024-11-14 NOTE — TELEPHONE ENCOUNTER
The recommendation was for speech therapy if you symptoms worsened.  I placed the order and you can call [phone] to schedule.  You might need to make an appointment to get your ear rechecked,  I see you have an appointment already with speech on 11/21/24.  Follow the antireflux diet until then.  No greasy foods, spicy foods, chocolate, caffeine, alcohol, spearmint, peppermint, lemon, lime, orange, grapefruit, tomatoes.  Don't eat 2 hours before bedtime  Elevate the head of bed   Message left for the patient.

## 2024-11-20 ENCOUNTER — TELEPHONE (OUTPATIENT)
Dept: PHYSICAL THERAPY | Facility: HOSPITAL | Age: 64
End: 2024-11-20

## 2024-11-21 ENCOUNTER — OFFICE VISIT (OUTPATIENT)
Dept: SURGERY | Facility: CLINIC | Age: 64
End: 2024-11-21

## 2024-11-21 ENCOUNTER — TELEPHONE (OUTPATIENT)
Dept: SURGERY | Facility: CLINIC | Age: 64
End: 2024-11-21

## 2024-11-21 ENCOUNTER — OFFICE VISIT (OUTPATIENT)
Dept: SPEECH THERAPY | Facility: HOSPITAL | Age: 64
End: 2024-11-21
Attending: SPECIALIST
Payer: COMMERCIAL

## 2024-11-21 DIAGNOSIS — R49.9 UNSPECIFIED VOICE AND RESONANCE DISORDER: Primary | ICD-10-CM

## 2024-11-21 DIAGNOSIS — M65.331 TRIGGER MIDDLE FINGER OF RIGHT HAND: Primary | ICD-10-CM

## 2024-11-21 PROCEDURE — 99214 OFFICE O/P EST MOD 30 MIN: CPT | Performed by: PLASTIC SURGERY

## 2024-11-21 PROCEDURE — 92524 BEHAVRAL QUALIT ANALYS VOICE: CPT

## 2024-11-21 NOTE — PROGRESS NOTES
ADULT VOICE EVALUATION:   Referring Physician: Dr. Toledo  Diagnosis: Unspecified voice and resonance disorder (R49.9)      Date of Service: 11/21/2024     PATIENT SUMMARY   Kirstin Sanders is a 64 year old y/o female who presents to therapy today with complaints of sudden closure of the throat, raspy vocal quality, inability to increase her loudness (yell) and difficulties swallowing that started a year ago, but have been becoming more frequent.  She feels something is blocking her breathing, her heart starts racing and sometimes feels like she might pass out.  She had a video swallow study on 10/15/24 which was essentially normal.  She does not have difficulty swallowing but feels something blocking after she swallows.  She has not had an esophagram.  Additional symptoms include frequent throat clearing, mouth dryness, muscle fatigue and soreness, shortness of breath, gradual lowering of pitch.  Environmental and related risk factors include occasional alcohol, her talking excessively because of her job which requires frequent presentations.  She has had several recent hospitalizations for CHF, hysterectomy, surgery for resection of colon polyps, and cardiac surgery for placement of stent.  Pt describes pain level 0/10.      Kirstin describes prior level of function WNL. Pt goals include improve vocal quality and reduce spasm-like episodes.    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  She needs to use an energetic, animated voice for her presentations to demonstrate excitement of her product.  She lives at home with her  and dog.  She is very social and frequently gathers with or participates in various activities with friends.        Problem List  Active Problems:  Active  Problems:    * No active hospital problems. *      Past Medical History  Past Medical History:    A-fib (HCC)    BRCA gene mutation negative in female    Negative 70 gene hereditary cancer panel, x1 VUS, report in media tab    Cardiomyopathy (HCC)    EF 45%    Heart failure (HCC)    High blood pressure    High cholesterol    Shoulder fracture, left    Surgical repair; ok since     Visual impairment    contacts               ASSESSMENT:   Kirstin presents with moderate dysphonia characterized by hoarse, strained vocal quality and almost constant glottal almaguer.  She reports episodes described as a sudden inability to breathe which is consistent with laryngospasm.    Kirstin would benefit from skilled Speech Therapy to address the above impairments to train compensatory strategies for use when episodes occur and improve vocal quality to WNL.  Recommendations: Voice therapy is recommended once a week for 6 weeks           OBJECTIVE:   VOCAL ANALYSIS (Consensus Auditory-Perceptual Evaluation of Voice (CAPE-V) Completed)  Overall Severity: Moderately Deviant, Consistent, and Score: 30/100  Roughness: Moderately Deviant, Consistent, and Score: 40/100  Breathiness: Inconsistent and Score: 2/100  Strain: Mild-Moderately Deviant, Consistent, and Score: 25  Pitch: WNL  Loudness: WNL      VOCAL QUALITY:  Vocal quality was hoarse, strained with significant glottal almaguer.  Perturbation measures were assessed for sustained vowels:  Jitter:0.14 - 2.15%  Normal limits for jitter is less than 1.04%.  Shimmer 2.78 - 5.67%   Normal limits for shimmer is less than 3.81%    PITCH:  Fundamental frequency was within normal limits and was 178Hz in conversation and 158Hz in oral reading.    PHONATORY/RESPIRATORY CONTROL:  mildly impaired  Maximum phonation time for sustained vowels was 11.73 seconds with a range of 9.95-11.73 seconds.  Normal range for adult females is 15-25 seconds and for adult males 25-35 seconds.  S/Z Ratio: 1.23 which is  slightly above normal limits  Breathing type:    Mixed  Average number of words per breath during reading aloud:  8.75.  Breathing during speaking tasks was variable.  Pt tended to speak until out of breath and then would need to take a breath several times after 4-5 words.    RESONANCE: WNL    INTENSITY:  Minimally reduced.  Intensity was 67dB in conversation and 70dB in oral reading.        Vocal Handicap Index (VHI) Score:   Functional: severe, 27   Physical: severe, 31   Emotional: severe, 21   Total Score: severe, 79    Vocal Abuses: excessive throat clearing, talking for extended periods of time, and giving frequent presentations    Today's Treatment/Education:   Patient education provided on laryngospasm and bernuli effect and why exercises work.  Patient was instructed in and issued a HEP for laryngospasm exercises.  Small straw breathing    Charges: Eval x1    Total Treatment Time: 45 min          PLAN OF CARE:    Goals:    The patient will reduce or elminate vocally abusive behaviors such as coughing/throat clearing.    The patient will adhere to a vocal hygiene program inlcuding increasing completing general and vocal relaxation exercises daily with 90% accuracy with minimal assistance.    The patient will use vocal strategies in words, sentences, rote speech tasks and then conversation with 90% accuracy.    The patient will reduced strained, hoarse vocal quality and reduce vocal almaguer in conversation to less than 10%.    The patient will increase loudness while maintaining normal vocal quality and adequate breath support in conversation 90% of the time.    The patient will develop use of slow breathing exercises with and without straw as a compensatory strategy to use during laryngospasm events.  Pt will demonstrate exercises with 100% accuracy and will attempt to use during episodes per patient report.    Frequency / Duration: Patient will be seen for 1 x/week or a total of 6 visits over a 90 day period.   Treatment will include: speech therapy    Education or treatment limitation: None  Rehab Potential:excellent    FCM category and level: Voice, 4      Patient was advised of these findings, precautions, and treatment options and has agreed to actively participate in planning and for this course of care.    Thank you for your referral. Please co-sign or sign and return this letter via fax as soon as possible to [phone]. If you have any questions, please contact me at Dept: [phone]    Sincerely,  Electronically signed by therapist: ADRIANO Mcclendon MA/[facility]-SLP  Speech Language Pathologist  [facility]  [phone]      Physician's certification required: Yes  I certify the need for these services furnished under this plan of treatment and while under my care.    X___________________________________________________ Date____________________    Certification From: 11/21/2024  To:2/19/2025

## 2024-11-21 NOTE — H&P
Kirstin Sanders is a 64 year old female that presents with   Chief Complaint   Patient presents with    Pain     RMF trigger   .    REFERRED BY:  No ref. provider found    7/14/2023: Correspondence from BUD Benítez, from [facility]: Acceptable to hold Eliquis x3 days prior to injection\"      Pacemaker: No  Latex Allergy: no  Coumadin: No  Plavix: YES  LAST TAKEN 11/20/24 PRESCRIBED BY DR BARAKAT  Other anticoagulants: ELIQUIS LAST TAKEN 11/21/24 PRESCRIBED BY Dr. FINN                            Cardiac stents: YES    HAND DOMINANCE:  Right  Profession:     RECONSTRUCTIVE HISTORY    SUN EXPOSURE   Current yes   Past yes   Sunburns yes   Tanning salons current no   Tanning salons past yes   Radiation treatments No     SKIN CANCER    Personal history of skin cancer: none    HAND     Previous hand injury (personal)    No      HPI:       64-year-old female right-hand-dominant with RMF trigger    Pain with triggering and locking, worse in the morning the pain all day    She had a right thumb trigger injected a year ago with complete relief of symptoms    She is currently on Plavix and Eliquis      Review of Systems:   Constitutional: No change in appetite, chill/rigors, or fatigue  GI: No jaundice  Endocrine: No generalized weakness  Neurological: No aphasia, loss of consciousness, or seizures    Musculoskeletal:    TRIGGER    Right    middle finger    Duration:   4 weeks      PAIN  Yes constant pain of RMF DIP and PIP. Rates pain 9/10.  Not taking analgesics.  TRIGGER Yes  LOCKING Yes    PREVIOUS TREATMENT None        PMH:     MEDICAL  Past Medical History:    A-fib (HCC)    BRCA gene mutation negative in female    Negative 70 gene hereditary cancer panel, x1 VUS, report in media tab    Cardiomyopathy (HCC)    EF 45%    Heart failure (HCC)    High blood pressure    High cholesterol    Shoulder fracture, left    Surgical repair; ok since     Visual impairment    contacts        SURGICAL  Past  Surgical History:   Procedure Laterality Date    Bunionectomy Bilateral     Cath bare metal stent (bms)      Colonoscopy N/A 02/26/2024    with polypectomy, clip x 3    Colonoscopy N/A 02/26/2024    Procedure: COLONOSCOPY with polypectomy, clip x 3;  Surgeon: Aashish Jeff MD;  [facility] ENDOSCOPY    Other surgical history Right     shoulder and humerous fractures repaired    Other surgical history Bilateral     ear pinning    Other surgical history Left     Ear stapectomy,failed    Pt w/ coronary artery stent  01/18/2024        ALLERIGIES  Allergies[1]     MEDICATIONS  Current Outpatient Medications   Medication Sig Dispense Refill    famotidine 40 MG Oral Tab Take 1 tablet (40 mg total) by mouth daily. 30 tablet 5    metoprolol succinate ER 50 MG Oral Tablet 24 Hr Take 1 tablet (50 mg total) by mouth nightly. 30 tablet 3    sacubitril-valsartan 24-26 MG Oral Tab Take 1 tablet by mouth 2 (two) times daily. 60 tablet 3    spironolactone 25 MG Oral Tab Take 0.5 tablets (12.5 mg total) by mouth daily.      clopidogrel 75 MG Oral Tab Take 1 tablet (75 mg total) by mouth daily. 90 tablet 3    atorvastatin 40 MG Oral Tab Take 1 tablet (40 mg total) by mouth nightly. 90 tablet 3    ELIQUIS 5 MG Oral Tab TK 1 T PO  BID  5        SOCIAL HISTORY  Social History     Socioeconomic History    Marital status:    Tobacco Use    Smoking status: Never    Smokeless tobacco: Never   Vaping Use    Vaping status: Never Used   Substance and Sexual Activity    Alcohol use: Yes     Alcohol/week: 2.0 standard drinks of alcohol     Types: 1 Glasses of wine, 1 Cans of beer per week     Comment: Weekend max 2    Drug use: No    Sexual activity: Yes     Partners: Male   Other Topics Concern    Caffeine Concern No    Exercise No    Right Handed Yes   Social History Narrative    The patient does not use an assistive device..      The patient does live in a home with stairs.     Social Drivers of Health     Financial Resource  Strain: Low Risk  (7/2/2024)    Financial Resource Strain     Difficulty of Paying Living Expenses: Not hard at all     Med Affordability: No   Food Insecurity: No Food Insecurity (6/29/2024)    Food Insecurity     Food Insecurity: Never true   Transportation Needs: No Transportation Needs (6/29/2024)    Transportation Needs     Lack of Transportation: No   Physical Activity: Insufficiently Active (6/16/2020)    Received from [facility]    Exercise Vital Sign     Days of Exercise per Week: 3 days     Minutes of Exercise per Session: 20 min   Housing Stability: Low Risk  (6/29/2024)    Housing Stability     Housing Instability: No        FAMILY HISTORY  Family History   Problem Relation Age of Onset    Cancer Mother 61        ovarian    Ovarian Cancer Mother 61    Heart Disorder Father         Heart valve disease    Breast Cancer Paternal Aunt         50s    Breast Cancer Paternal Cousin           PHYSICAL EXAM:     CONSTITUTIONAL: Overall appearance - Normal  HEENT: Normocephalic  EYES: Conjunctiva - Right: Normal, Left: Normal; EOMI  EARS: Inspection - Right: Normal, Left: Normal  NECK/THYROID: Inspection - Normal, Palpation - Normal, Thyroid gland - Normal, No adenopathy  RESPIRATORY: Inspection - Normal, Effort - Normal  CARDIOVASCULAR: Regular rhythm, No murmurs  ABDOMEN: Inspection - Normal, No abdominal tenderness  NEURO: Memory intact  PSYCH: Oriented to person, place, time, and situation, Appropriate mood and affect      Hand Physical Exam:       RMF MP tenderness with crepitation and triggering    Right thumb full painless range of motion without crepitation triggering or locking  No MP tenderness      ASSESSMENT/PLAN:       TRIGGER DIGIT RMF          We discussed what a TRIGGER DIGIT is, including treatment options.  Questions were answered and the patient wishes to proceed with treatment.     I would recommend steroid injection.  This has an approximately 50% chance of  symptom improvement.  If there is little or no relief with the injection, surgery may be necessary.  There may be recurrence after injection, necessitating another injection or surgery.  The entire digit may be numb for one to two hours after the injection.  It may take several days for the injection to take effect.  If symptoms persist or triggering is still bothersome, the patient will make an appointment.      She will need to be off Plavix 5 days and Eliquis 2 days prior to injection    We will reach out to her cardiologist's         11/21/2024  Charlie Acevedo MD           +++++++++++++++++++++++++++++++++++++++++      MEDICAL DECISION MAKING    PROBLEMS      MODERATE    (number / complexity)          Undiagnosed new illness with uncertain prognosis, progression    DATA         STRAIGHTFORWARD    (amount / complexity)           MANAGEMENT RISK  MODERATE    (complications/ morbidity)       Steroid injection                  MDM LEVEL    MODERATE            [1]   Allergies  Allergen Reactions    Hctz [Hydrochlorothiazide] SHORTNESS OF BREATH    Lisinopril SHORTNESS OF BREATH    Penicillins SHORTNESS OF BREATH and UNKNOWN     DYSPNEA    Since infancy    Couldn't breathe. Patient is unsure if any skin blistering or organ involvement

## 2024-11-21 NOTE — TELEPHONE ENCOUNTER
Pt on Plavix and Eliquis,needs steroid injection right middle finger by Dr Acevedo.  Faxed Dr Macdonald for clearance for pt to be off plavix 5 days prior to RMF steroid injection and faxed Dr Borrero for clearance for pt to be off eliquis 2 days prior to RMF steroid injection.  Confirmation fax was successful was received.

## 2024-11-25 NOTE — TELEPHONE ENCOUNTER
Received fax from Dr Macdonald, \"Hold eliquis for 48 hours prior to the hand injection,restart as soon as possible\"  Per pt Dr Macdonald prescribes plavix and Dr Borrero prescribes Eliquis.   Resent Dr Macdonald clearance for pt to be off plavix 5 days prior to steroid injection to RMF trigger finger by Dr Acevedo.

## 2024-12-04 NOTE — TELEPHONE ENCOUNTER
Spoke to Joselin from Dr Macdonald's office requested to speak with clinical staff to discuss anticoagulation clearance for pt prior to  RMF steroid injection by Dr Acevedo ,no one was available to talk.  Joselin states she'll send a message to clinical to call our office 12/45/24 between 9-5 and ask to speak with a nurse.

## 2024-12-09 NOTE — TELEPHONE ENCOUNTER
Received fax from Dr Jordan \"Hold eliquis for 48 hours prior to the hand injection,restart as soon as possible. Plavix was discontinued at office visit 11/22/24\"  Changed in specialty comments and deleted plavix from medication list.  Dr Acevedo notified.

## 2024-12-09 NOTE — TELEPHONE ENCOUNTER
Spoke with pt confirmed to hold eliquis 2 days prior to injection. Pt informed me her cardiologist has given holding instructions last dose to be taken 12/8/24 and resume after injection. Plavix has been discontinued by cardiologist 11/22/24. Pt has scheduled steroid injection 12/12/24. Dr. Acevedo notified.

## 2024-12-09 NOTE — TELEPHONE ENCOUNTER
Spoke with [facility], requested clinical staff call us back to discuss anticoagulation clearance for pt, they took a message and verbalized understanding  See previous message from 12/4/24.

## 2024-12-12 ENCOUNTER — PATIENT MESSAGE (OUTPATIENT)
Dept: OTOLARYNGOLOGY | Facility: CLINIC | Age: 64
End: 2024-12-12

## 2024-12-12 ENCOUNTER — OFFICE VISIT (OUTPATIENT)
Dept: SPEECH THERAPY | Facility: HOSPITAL | Age: 64
End: 2024-12-12
Attending: SPECIALIST
Payer: COMMERCIAL

## 2024-12-12 ENCOUNTER — OFFICE VISIT (OUTPATIENT)
Dept: SURGERY | Facility: CLINIC | Age: 64
End: 2024-12-12

## 2024-12-12 VITALS — SYSTOLIC BLOOD PRESSURE: 98 MMHG | DIASTOLIC BLOOD PRESSURE: 62 MMHG | RESPIRATION RATE: 20 BRPM | HEART RATE: 60 BPM

## 2024-12-12 DIAGNOSIS — M65.331 TRIGGER MIDDLE FINGER OF RIGHT HAND: Primary | ICD-10-CM

## 2024-12-12 DIAGNOSIS — R13.14 PHARYNGOESOPHAGEAL DYSPHAGIA: Primary | ICD-10-CM

## 2024-12-12 DIAGNOSIS — M65.311 TRIGGER THUMB OF RIGHT HAND: ICD-10-CM

## 2024-12-12 PROCEDURE — 92507 TX SP LANG VOICE COMM INDIV: CPT

## 2024-12-12 RX ORDER — BETAMETHASONE SODIUM PHOSPHATE AND BETAMETHASONE ACETATE 3; 3 MG/ML; MG/ML
6 INJECTION, SUSPENSION INTRA-ARTICULAR; INTRALESIONAL; INTRAMUSCULAR; SOFT TISSUE ONCE
Status: COMPLETED | OUTPATIENT
Start: 2024-12-12 | End: 2024-12-12

## 2024-12-12 NOTE — PROGRESS NOTES
Injury 1: RTH injury  - Date: 06/02/23  - Days Since: 559  Follow up appointment for RMF trigger,scheduled steroid injection.  Symptoms unchanged since consultation.  Last dose of eliquis taken 12/8/24, plavix was stopped 11/22/24    Off Eliquis since 12/8/2024    INJECTION PROCEDURE NOTE     Procedure performed by: Charlie Acevedo MD    Date: 12/12/2024    Procedural time-out required/obtained? Yes    PROCEDURE DETAILS:  Informed consent was obtained.    Questions answered and the patient wishes to proceed with treatment.  The areas were cleaned using aseptic technique.   Location: right middle finger  Injected with: Celestone 6 mg/Lidocaine 1% injection (2cc total)  Estimated blood loss: None  The patient tolerated the procedure well.  Plan: Follow up as directed.

## 2024-12-12 NOTE — PROGRESS NOTES
After evaluation by Dr. Acevedo, orders verified for **cc celestone injection(s) to steroid injection Right middle finger  1cc/6mg Betamethasone and 1cc/10mg 1% Lidocaine drawn up into one syringe for injection(s).  Consent obtained and witnessed, and time-out performed by RN.  Patient's vitals and pain score pre-and post-injection recorded in vitals section.  Patient reclined in exam chair and armboard placed into position for injection(s).  Patient tolerated procedure well and band-aid(s) applied.  Patient exam chair returned to sitting position and patient left office in satisfactory condition.  Patient will call in one month if pain continues or worsens.  Patient instructed to call the office with any further questions and/or concerns.

## 2024-12-12 NOTE — PROGRESS NOTES
Diagnosis:  Unspecified voice and resonance disorder (R49.9)       Authorized # of Visits:  no co pay, no auth         Precautions:        Subjective: When drinking or eating a large amount of liquid or food, Kirstin feels a bubble in her esophagus that will not go down.  This triggers the same spasm-like feeling as before.  On Monday, she felt tingling throughout her whole body along with these symptoms.  Her \"spasms\" occur mostly in the morning after she takes her first bite of food or liquid.    Objective:      Date: 12/12/2024  Tx#: 2/6 Date:   Tx#: 3/ Date:   Tx#: 4/ Date:   Tx#: 5/ Date:   Tx#: 6/ Date:   Tx#: 7/   SOVT Through straw with and without water.        Slow easy breathing for laryngospasm With cocktail straw and without.  Slow easy breathing.  Incorporated abdominal breathing cues.          Sighs/vocal relaxation Max cues and model required for resonant voice.        Facial focus Briefly introduced facial focus in cuing for sighs.        Conversational voice Hoarse, strained        Loudness              Assessment: Pt identified new symptoms today which may be more related to esophageal dysfunction than to laryngospasm.  Pt may benefit from further testing of the esophagus.  A message was sent to Dr. Toledo.  The patient's voice was mild-moderately hoarse and strained at the beginning of the session.  SOVT exercise using a cocktail straw was practiced for slow easy breathing as a strategy to use when the patient has a laryngospasm.  Pt was able to complete with cues.  Semi occluded vocal tract exercises were completed with regular sized straw with and without water. Pt able to generate smooth, clear vocal quality during SOVT.  Introduced sighing as way to relax the vocal cords and generate more air through the glottis.  Pt required direct model, cues for facial focus and a shorter duration of phonation to generate clear vocal quality.       Goals:   The patient will reduce or elminate vocally  abusive behaviors such as coughing/throat clearing.    The patient will adhere to a vocal hygiene program inlcuding increasing completing general and vocal relaxation exercises daily with 90% accuracy with minimal assistance.    The patient will use vocal strategies in words, sentences, rote speech tasks and then conversation with 90% accuracy.    The patient will reduced strained, hoarse vocal quality and reduce vocal almaguer in conversation to less than 10%.    The patient will increase loudness while maintaining normal vocal quality and adequate breath support in conversation 90% of the time.    The patient will develop use of slow breathing exercises with and without straw as a compensatory strategy to use during laryngospasm events.  Pt will demonstrate exercises with 100% accuracy and will attempt to use during episodes per patient report.    Plan: Continue therapy per specified goals.    Skilled Services: voice therapy    Charges: [id number]         Total Treatment Time: 45 min    Maryellen Alberto MA/[facility]-SLP  Speech Language Pathologist  [facility]  [phone]

## 2024-12-19 ENCOUNTER — APPOINTMENT (OUTPATIENT)
Dept: SPEECH THERAPY | Facility: HOSPITAL | Age: 64
End: 2024-12-19
Attending: SPECIALIST
Payer: COMMERCIAL

## 2024-12-19 ENCOUNTER — HOSPITAL ENCOUNTER (OUTPATIENT)
Dept: GENERAL RADIOLOGY | Facility: HOSPITAL | Age: 64
Discharge: HOME | End: 2024-12-19
Attending: SPECIALIST
Payer: COMMERCIAL

## 2024-12-19 DIAGNOSIS — R13.14 PHARYNGOESOPHAGEAL DYSPHAGIA: ICD-10-CM

## 2024-12-19 PROCEDURE — 74246 X-RAY XM UPR GI TRC 2CNTRST: CPT | Performed by: SPECIALIST

## 2024-12-26 ENCOUNTER — APPOINTMENT (OUTPATIENT)
Dept: SPEECH THERAPY | Facility: HOSPITAL | Age: 64
End: 2024-12-26
Attending: SPECIALIST
Payer: COMMERCIAL

## 2025-06-03 ENCOUNTER — APPOINTMENT (OUTPATIENT)
Dept: GENERAL RADIOLOGY | Age: 65
End: 2025-06-03
Attending: STUDENT IN AN ORGANIZED HEALTH CARE EDUCATION/TRAINING PROGRAM
Payer: COMMERCIAL

## 2025-06-03 ENCOUNTER — HOSPITAL ENCOUNTER (OUTPATIENT)
Age: 65
Discharge: HOME | End: 2025-06-03
Attending: STUDENT IN AN ORGANIZED HEALTH CARE EDUCATION/TRAINING PROGRAM
Payer: COMMERCIAL

## 2025-06-03 VITALS
DIASTOLIC BLOOD PRESSURE: 70 MMHG | SYSTOLIC BLOOD PRESSURE: 111 MMHG | RESPIRATION RATE: 16 BRPM | OXYGEN SATURATION: 99 % | HEART RATE: 91 BPM | TEMPERATURE: 98 F

## 2025-06-03 DIAGNOSIS — S89.92XA INJURY OF LEFT KNEE, INITIAL ENCOUNTER: Primary | ICD-10-CM

## 2025-06-03 DIAGNOSIS — M25.562 ACUTE PAIN OF LEFT KNEE: ICD-10-CM

## 2025-06-03 DIAGNOSIS — M25.469 SUPRAPATELLAR EFFUSION OF KNEE: ICD-10-CM

## 2025-06-03 PROCEDURE — 73562 X-RAY EXAM OF KNEE 3: CPT | Performed by: STUDENT IN AN ORGANIZED HEALTH CARE EDUCATION/TRAINING PROGRAM

## 2025-06-03 PROCEDURE — 99214 OFFICE O/P EST MOD 30 MIN: CPT

## 2025-06-03 PROCEDURE — 99213 OFFICE O/P EST LOW 20 MIN: CPT

## 2025-06-03 NOTE — ED PROVIDER NOTES
Patient Seen in: [facility]        History  Chief Complaint   Patient presents with    Knee Pain     Stated Complaint: Knee problem    Subjective:   HPI    65-year-old female past medical history of uterine cancer s/p hysterectomy, cardiomyopathy, CAD s/p stent, and atrial fibrillation on anticoagulation, who presents with left knee pain.  Patient reports that 8 days ago, while at work, she bumped the left anterior medial knee on a cabinet that was pulled out, pain lasted for 2 days, resolved with Tylenol and icing, 2 days later, she was getting up from the kitchen counter and hit the same area of the knee, she is able to ambulate, she is still icing, but does note mild swelling as well as pain.  She denies any skin injury.  She states this is NOT a Workmen's Compensation case.    Objective:     No pertinent past medical history.            No pertinent past surgical history.              No pertinent social history.            Review of Systems    Positive for stated complaint: Knee problem  Other systems are as noted in HPI.  Constitutional and vital signs reviewed.      All other systems reviewed and negative except as noted above.                  Physical Exam    ED Triage Vitals [06/03/25 1158]   /70   Pulse 91   Resp 16   Temp 97.6 °F (36.4 °C)   Temp src Oral   SpO2 99 %   O2 Device None (Room air)       Current Vitals:   Vital Signs  BP: 111/70  Pulse: 91  Resp: 16  Temp: 97.6 °F (36.4 °C)  Temp src: Oral    Oxygen Therapy  SpO2: 99 %  O2 Device: None (Room air)            Physical Exam    General: Awake, alert, comfortable on room air, in no distress, tolerating oral secretions, interactive  Pulmonary: No conversational dyspnea  Neuro: Symmetrical facial expressions on gross observation  Musculoskeletal: 5/5 B/L plantarflexion and dorsiflexion, 5/5 B/L active knee flexion and extension, she has intact active complete knee flexion and extension, no high riding or low riding patella, TTP  of the left anterior and medial and superior knee, no overlying skin changes, mild swelling of the left knee, otherwise no asymmetry of the B/L lower extremities, ambulates with very mild limp, negative left-sided anterior and posterior knee drawer signs and negative left-sided Lyn's, soft compartments of the left lower extremity,   HEENT: No periorbital edema or erythema  Skin: No erythema or ecchymosis or laceration or abrasions or skin injuries of the left knee  Psych: Normal mood, normal affect        ED Course  Copy of radiology report of patient's left knee x-ray:  Narrative  PROCEDURE: XR KNEE ROUTINE (3 VIEWS), LEFT (CPT=73562)     COMPARISON: [facility] 2nd Floor, XR KNEE, COMPLETE (4 OR MORE VIEWS), LEFT (CPT=73564), 5/03/2022, 11:20 AM.     INDICATIONS: Left knee pain post injury.     TECHNIQUE: 3 views were obtained.       FINDINGS:  BONES: No acute left knee fracture.  Minor tricompartmental degenerative spurring.  SOFT TISSUES: No visible soft tissue swelling.  EFFUSION: Trace suprapatellar joint effusion.  OTHER: Negative.              Impression  CONCLUSION:  1. No radiographically visible acute osseous injury of the left knee.  2. Trace suprapatellar joint effusion.        [facility]     Dictated by (CST): Antony Nayak MD on 6/03/2025 at 12:37 PM      Finalized by (CST): Antony Nayak MD on 6/03/2025 at 12:37 PM              Exam Ended: 06/03/25 12:23 Last Resulted: 06/03/25 12:38     MDM  Patient is awake, alert, comfortable on room air, in no distress, soft compartments to the left lower extremity, very mild swelling localized to the left knee when compared to the right knee, otherwise no asymmetry, negative left sided anterior and posterior knee drawer signs and negative Lyn's, no sign of skin injury, no sign of septic arthritis, no sign of cellulitis, intact active range of motion of the left knee, there is tenderness to palpation of the left medial  anterior and superior knee, consider potential fracture versus soft tissue injury versus joint effusion versus other structural injury, she confirms that this is NOT a Workmen's Compensation case  - Per my personal review and interpretation of the patient's left knee x-ray I do not appreciate any fractures, there does appear to be a small effusion, this is consistent with my review of the radiology report which notes no osseous injury but a trace suprapatellar joint effusion is noted, and this was discussed with the patient.  -We discussed symptomatic management with rest, elevation when at rest, safe application of ice, and over-the-counter Tylenol if needed for pain control, patient will continue to avoid over-the-counter NSAIDs as she is currently on anticoagulation for history of atrial fibrillation, she is going to [place] in 2 days, we did discuss no heavy lifting, no straining, no running, no jumping, no long walking so as to decrease irritation of the joint.  We discussed that she may benefit from transportation through the airport to her boarding site/terminal so as to decrease walking and irritation of the joint but she declines.  -Patient declines a cane to assist with ambulation  -Patient is wearing heels, we discussed gym shoes to provide better support with ambulation  -We discussed that x-ray imaging is limited to assessment of bones and cannot assess the tendons, ligaments, muscles, and other structures which also could be injured, and I therefore emphasized the importance of orthopedic follow-up for reassessment and for further recommendations, patient was provided with the orthopedic phone number in her discharge instructions to help schedule follow-up.  -With any new, changing, or progressing signs or symptoms, I recommended immediate reassessment      Medical Decision Making  Amount and/or Complexity of Data Reviewed  Radiology: ordered and independent interpretation performed.    Risk  OTC  drugs.        Disposition and Plan     Clinical Impression:  1. Injury of left knee, initial encounter    2. Acute pain of left knee    3. Suprapatellar effusion of knee         Disposition:  Discharge  6/3/2025 12:42 pm        Follow-up:    Orthopedics    To schedule an appointment with the Orthopedic and Sports Medicine department; please text or call [phone] and choose option 3 when prompted.        Medications Prescribed:  Discharge Medication List as of 6/3/2025 12:47 PM            None

## 2025-06-03 NOTE — DISCHARGE INSTRUCTIONS
X-ray imaging shows no broken bones.  However, as we discussed there can be occult/hidden fractures that are not initially appreciated on x-ray imaging.  Your x-ray does show small fluid in the joint called a joint effusion.  We did discuss possibility for soft tissue injury/bruising of the bone, but also discussed that x-ray imaging cannot assess the tendons, ligaments, muscles, menisci, and other structures which also could be injured.  Therefore, I recommend rest, elevation when at rest, application of ice 3 times a day, 10 minutes each time,  with a barrier between the skin and the ice pack so as to prevent skin injury.  No heavy lifting or straining or long walks and no running until follow-up with orthopedics for reassessment and for further recommendations.  I recommend follow with orthopedics for reassessment and for further recommendations.      To schedule an appointment with the Orthopedic and Sports Medicine department; please text or call [phone] and choose option 3 when prompted.

## 2025-06-11 ENCOUNTER — OFFICE VISIT (OUTPATIENT)
Facility: CLINIC | Age: 65
End: 2025-06-11

## 2025-06-11 DIAGNOSIS — M25.562 ACUTE PAIN OF LEFT KNEE: Primary | ICD-10-CM

## 2025-06-11 RX ORDER — KETOROLAC TROMETHAMINE 30 MG/ML
30 INJECTION, SOLUTION INTRAMUSCULAR; INTRAVENOUS ONCE
Status: COMPLETED | OUTPATIENT
Start: 2025-06-11 | End: 2025-06-11

## 2025-06-11 RX ORDER — METHYLPREDNISOLONE ACETATE 40 MG/ML
40 INJECTION, SUSPENSION INTRA-ARTICULAR; INTRALESIONAL; INTRAMUSCULAR; SOFT TISSUE ONCE
Status: COMPLETED | OUTPATIENT
Start: 2025-06-11 | End: 2025-06-11

## 2025-06-11 RX ADMIN — METHYLPREDNISOLONE ACETATE 40 MG: 40 INJECTION, SUSPENSION INTRA-ARTICULAR; INTRALESIONAL; INTRAMUSCULAR; SOFT TISSUE at 08:25:00

## 2025-06-11 RX ADMIN — KETOROLAC TROMETHAMINE 30 MG: 30 INJECTION, SOLUTION INTRAMUSCULAR; INTRAVENOUS at 08:25:00

## 2025-06-12 VITALS — HEART RATE: 76 BPM | SYSTOLIC BLOOD PRESSURE: 116 MMHG | DIASTOLIC BLOOD PRESSURE: 72 MMHG

## 2025-08-13 ENCOUNTER — OFFICE VISIT (OUTPATIENT)
Dept: INTERNAL MEDICINE CLINIC | Facility: CLINIC | Age: 65
End: 2025-08-13

## 2025-08-13 VITALS
SYSTOLIC BLOOD PRESSURE: 108 MMHG | WEIGHT: 139 LBS | TEMPERATURE: 98 F | BODY MASS INDEX: 24.63 KG/M2 | HEART RATE: 78 BPM | HEIGHT: 63 IN | DIASTOLIC BLOOD PRESSURE: 60 MMHG | OXYGEN SATURATION: 98 % | RESPIRATION RATE: 18 BRPM

## 2025-08-13 DIAGNOSIS — I42.0 DILATED CARDIOMYOPATHY (HCC): ICD-10-CM

## 2025-08-13 DIAGNOSIS — E78.00 HYPERCHOLESTEREMIA: ICD-10-CM

## 2025-08-13 DIAGNOSIS — I25.10 CORONARY ARTERY DISEASE INVOLVING NATIVE CORONARY ARTERY OF NATIVE HEART WITHOUT ANGINA PECTORIS: ICD-10-CM

## 2025-08-13 DIAGNOSIS — Z00.00 ROUTINE PHYSICAL EXAMINATION: Primary | ICD-10-CM

## 2025-08-13 DIAGNOSIS — I10 ESSENTIAL HYPERTENSION: ICD-10-CM

## 2025-08-13 DIAGNOSIS — Z85.42 HISTORY OF UTERINE CANCER: ICD-10-CM

## 2025-08-13 DIAGNOSIS — I48.91 ATRIAL FIBRILLATION, UNSPECIFIED TYPE (HCC): ICD-10-CM

## 2025-08-13 DIAGNOSIS — R13.10 DYSPHAGIA, UNSPECIFIED TYPE: ICD-10-CM

## 2025-08-13 RX ORDER — CLOPIDOGREL BISULFATE 75 MG/1
75 TABLET ORAL DAILY
COMMUNITY
Start: 2025-04-06

## 2025-08-13 RX ORDER — FLUTICASONE PROPIONATE 50 MCG
2 SPRAY, SUSPENSION (ML) NASAL DAILY
Qty: 1 EACH | Refills: 5 | Status: SHIPPED | OUTPATIENT
Start: 2025-08-13

## 2025-08-13 RX ORDER — FUROSEMIDE 40 MG/1
40 TABLET ORAL DAILY
COMMUNITY
Start: 2025-03-04

## 2025-08-14 ENCOUNTER — LAB ENCOUNTER (OUTPATIENT)
Dept: LAB | Facility: HOSPITAL | Age: 65
End: 2025-08-14
Attending: INTERNAL MEDICINE

## 2025-08-14 DIAGNOSIS — Z00.00 ROUTINE PHYSICAL EXAMINATION: ICD-10-CM

## 2025-08-14 LAB
ALBUMIN SERPL-MCNC: 5.3 G/DL (ref 3.2–4.8)
ALBUMIN/GLOB SERPL: 1.9 (ref 1–2)
ALP LIVER SERPL-CCNC: 91 U/L (ref 50–130)
ALT SERPL-CCNC: 25 U/L (ref 10–49)
ANION GAP SERPL CALC-SCNC: 9 MMOL/L (ref 0–18)
AST SERPL-CCNC: 25 U/L (ref ?–34)
BASOPHILS # BLD AUTO: 0.01 X10(3) UL (ref 0–0.2)
BASOPHILS NFR BLD AUTO: 0.2 %
BILIRUB SERPL-MCNC: 1.6 MG/DL (ref 0.2–1.1)
BUN BLD-MCNC: 18 MG/DL (ref 9–23)
BUN/CREAT SERPL: 17.6 (ref 10–20)
CALCIUM BLD-MCNC: 9.8 MG/DL (ref 8.7–10.4)
CHLORIDE SERPL-SCNC: 97 MMOL/L (ref 98–112)
CHOLEST SERPL-MCNC: 140 MG/DL (ref ?–200)
CO2 SERPL-SCNC: 31 MMOL/L (ref 21–32)
CREAT BLD-MCNC: 1.02 MG/DL (ref 0.55–1.02)
DEPRECATED RDW RBC AUTO: 43.5 FL (ref 35.1–46.3)
EGFRCR SERPLBLD CKD-EPI 2021: 62 ML/MIN/1.73M2 (ref 60–?)
EOSINOPHIL # BLD AUTO: 0.08 X10(3) UL (ref 0–0.7)
EOSINOPHIL NFR BLD AUTO: 1.4 %
ERYTHROCYTE [DISTWIDTH] IN BLOOD BY AUTOMATED COUNT: 12.7 % (ref 11–15)
FASTING PATIENT LIPID ANSWER: YES
FASTING STATUS PATIENT QL REPORTED: YES
GLOBULIN PLAS-MCNC: 2.8 G/DL (ref 2–3.5)
GLUCOSE BLD-MCNC: 114 MG/DL (ref 70–99)
HCT VFR BLD AUTO: 43 % (ref 35–48)
HDLC SERPL-MCNC: 57 MG/DL (ref 40–59)
HGB BLD-MCNC: 14.3 G/DL (ref 12–16)
IMM GRANULOCYTES # BLD AUTO: 0.01 X10(3) UL (ref 0–1)
IMM GRANULOCYTES NFR BLD: 0.2 %
LDLC SERPL CALC-MCNC: 71 MG/DL (ref ?–100)
LYMPHOCYTES # BLD AUTO: 2.01 X10(3) UL (ref 1–4)
LYMPHOCYTES NFR BLD AUTO: 34.4 %
MCH RBC QN AUTO: 30.8 PG (ref 26–34)
MCHC RBC AUTO-ENTMCNC: 33.3 G/DL (ref 31–37)
MCV RBC AUTO: 92.7 FL (ref 80–100)
MONOCYTES # BLD AUTO: 0.39 X10(3) UL (ref 0.1–1)
MONOCYTES NFR BLD AUTO: 6.7 %
NEUTROPHILS # BLD AUTO: 3.34 X10 (3) UL (ref 1.5–7.7)
NEUTROPHILS # BLD AUTO: 3.34 X10(3) UL (ref 1.5–7.7)
NEUTROPHILS NFR BLD AUTO: 57.1 %
NONHDLC SERPL-MCNC: 83 MG/DL (ref ?–130)
OSMOLALITY SERPL CALC.SUM OF ELEC: 287 MOSM/KG (ref 275–295)
PLATELET # BLD AUTO: 254 10(3)UL (ref 150–450)
POTASSIUM SERPL-SCNC: 4.2 MMOL/L (ref 3.5–5.1)
PROT SERPL-MCNC: 8.1 G/DL (ref 5.7–8.2)
RBC # BLD AUTO: 4.64 X10(6)UL (ref 3.8–5.3)
SODIUM SERPL-SCNC: 137 MMOL/L (ref 136–145)
TRIGL SERPL-MCNC: 55 MG/DL (ref 30–149)
TSI SER-ACNC: 4.55 UIU/ML (ref 0.55–4.78)
VLDLC SERPL CALC-MCNC: 8 MG/DL (ref 0–30)
WBC # BLD AUTO: 5.8 X10(3) UL (ref 4–11)

## 2025-08-14 PROCEDURE — 80053 COMPREHEN METABOLIC PANEL: CPT

## 2025-08-14 PROCEDURE — 36415 COLL VENOUS BLD VENIPUNCTURE: CPT

## 2025-08-14 PROCEDURE — 80061 LIPID PANEL: CPT

## 2025-08-14 PROCEDURE — 85025 COMPLETE CBC W/AUTO DIFF WBC: CPT

## 2025-08-14 PROCEDURE — 84443 ASSAY THYROID STIM HORMONE: CPT

## (undated) DIAGNOSIS — Z12.11 ENCOUNTER FOR SCREENING COLONOSCOPY: Primary | ICD-10-CM

## (undated) DEVICE — DRAPE,ROBOTICS,STERILE: Brand: MEDLINE

## (undated) DEVICE — SOLUTION IRRIG 1000ML 0.9% NACL USP BTL

## (undated) DEVICE — SUT VCRL 0 L27IN ABSRB VLT L36MM CT-1 1/2

## (undated) DEVICE — KIT CLEAN ENDOKIT 1.1OZ GOWNX2

## (undated) DEVICE — SUT VCRL 0 L18IN ABSRB VLT POLYGLACTIN 910

## (undated) DEVICE — SNARE OPTMZ PLPCTM TRP

## (undated) DEVICE — GLOVE SUR 7 SENSICARE PI PIP CRM PWD F

## (undated) DEVICE — SNARE POLYP 10MM X 230MM CLD OVL ROT W/ 2.8MM

## (undated) DEVICE — YANKAUER,FLEXIBLE HANDLE,REGLR CAPACITY: Brand: MEDLINE INDUSTRIES, INC.

## (undated) DEVICE — 3M(TM) MEDIPORE(TM) H SOFT CLOTH TAPE 2866: Brand: 3M™ MEDIPORE™

## (undated) DEVICE — GLOVE SUR 7.5 SENSICARE PI PIP GRN PWD F

## (undated) DEVICE — SOLUTION IRRIG 1000ML ST H2O AQUALITE PLAS

## (undated) DEVICE — SUT POLYSRB 0 60IN ABSRB UD POLY BRD

## (undated) DEVICE — CANNULA NASAL ADULT PIGTAIL L7

## (undated) DEVICE — TUBING SCT CLR 6FT .25IN MDVC

## (undated) DEVICE — SYRINGE REGULAR TIP 60ML

## (undated) DEVICE — SUT MCRYL 4-0 18IN PS-2 ABSRB UD 19MM 3/8 CIR

## (undated) DEVICE — PROXIMATE SKIN STAPLERS (35 WIDE) CONTAINS 35 STAINLESS STEEL STAPLES (FIXED HEAD): Brand: PROXIMATE

## (undated) DEVICE — KIT ENDO ORCAPOD 160/180/190

## (undated) DEVICE — SUT PDS II 1-0 96IN ABSRB VLT L70MM XLH

## (undated) DEVICE — CURVED, LARGE JAW, OPEN SEALER/DIVIDER NANO-COATED: Brand: LIGASURE IMPACT

## (undated) DEVICE — SUT COAT VCRL 0 27IN CT-1 ABSRB VLT 36MM 1/2

## (undated) DEVICE — SUT COAT VCRL 3-0 27IN SH ABSRB UD 26MM 1/2

## (undated) DEVICE — CONTAINER CLEAN 8OZ W/LID

## (undated) DEVICE — LAPAROTOMY: Brand: MEDLINE INDUSTRIES, INC.

## (undated) DEVICE — SUT PDS II 4-0 27IN SH ABSRB VLT L26MM 1/2

## (undated) DEVICE — SUT PLN GUT 2-0 27IN CT ABSRB TAN YELLOWISH 4

## (undated) DEVICE — TRAY CATH FOLEY 16FR COMPLT CARE URIMTR TEMP SENS

## (undated) DEVICE — SUT COAT VCRL 2-0 27IN SH ABSRB UD 26MM 1/2

## (undated) DEVICE — CONTAINER,SPECIMEN,OR STERILE,4OZ: Brand: MEDLINE

## (undated) NOTE — LETTER
No referring provider defined for this encounter. 07/25/17        Patient: Danie Pina   YOB: 1960   Date of Visit: 7/25/2017       Dear  Dr. Brittanie Zarco MD,      Thank you for referring Danie Pina to my practice.   Please find The patient indicates understanding of these issues and agrees to the plan. The patient is asked to return after cardioversion.               Sincerely,   Lisa Harrison MD   [facility] [address]

## (undated) NOTE — LETTER
AUTHORIZATION FOR SURGICAL OPERATION OR OTHER PROCEDURE    1. I hereby authorize Dr. Charlie Acevedo, and [facility] staff assigned to my case to perform the following operation and/or procedure at the [place] site:    Steroid injection Right middle finger    2.  My physician has explained the nature and purpose of the operation or other procedure, possible alternative methods of treatment, the risks involved, and the possibility of complication to me.  I acknowledge that no guarantee has been made as to the result that may be obtained.  3.  I recognize that, during the course of this operation, or other procedure, unforseen conditions may necessitate additional or different procedure than those listed above.  I, therefore, further authorize and request that the above named physician, his/her physician assistants or designees perform such procedures as are, in his/her professional opinion, necessary and desirable.  4.  Any tissue or organs removed in the operation or other procedure may be disposed of by and at the discretion of the [facility] and [facility].  5.  I understand that in the event of a medical emergency, I will be transported by local paramedics to [facility] or other hospital emergency department.  6.  I certify that I have read and fully understand the above consent to operation and/or other procedure.    7.  I acknowledge that my physician has explained sedation/analgesia administration to me including the risks and benefits.  I consent to the administration of sedation/analgesia as may be necessary or desirable in the judgement of my physician.    Witness signature: ___________________________________________________ Date:  ______/______/_____                    Time:  ________ A.M.  P.M.       Patient Name:  ______________________________________________________  (please print)      Patient signature:   ___________________________________________________             Relationship to Patient:           []  Parent    Responsible person                          []  Spouse  In case of minor or                    [] Other  _____________   Incompetent name:  __________________________________________________                               (please print)      _____________      Responsible person  In case of minor or  Incompetent signature:  _______________________________________________    Statement of Physician  My signature below affirms that prior to the time of the procedure, I have explained to the patient and/or his/her guardian, the risks and benefits involved in the proposed treatment and any reasonable alternative to the proposed treatment.  I have also explained the risks and benefits involved in the refusal of the proposed treatment and have answered the patient's questions.                        Date:  ______/______/_______  Provider                      Signature:  __________________________________________________________       Time:  ___________ A.M    P.M.

## (undated) NOTE — LETTER
AUTHORIZATION FOR SURGICAL OPERATION OR OTHER PROCEDURE    1. I hereby authorize Dr. Tamra Rose, and [facility] staff assigned to my case to perform the following operation and/or procedure at the [facility]:    _______________________________________________________________________________________________    Cortisone injection of the left knee  _______________________________________________________________________________________________    2. My physician has explained the nature and purpose of the operation or other procedure, possible alternative methods of treatment, the risks involved, and the possibility of complication to me. I acknowledge that no guarantee has been made as to the result that may be obtained. 3.  I recognize that, during the course of this operation, or other procedure, unforseen conditions may necessitate additional or different procedure than those listed above. I, therefore, further authorize and request that the above named physician, his/her physician assistants or designees perform such procedures as are, in his/her professional opinion, necessary and desirable. 4.  Any tissue or organs removed in the operation or other procedure may be disposed of by and at the discretion of the [facility] and [facility]. 5.  I understand that in the event of a medical emergency, I will be transported by local paramedics to [facility] or other hospital emergency department. 6.  I certify that I have read and fully understand the above consent to operation and/or other procedure. 7.  I acknowledge that my physician has explained sedation/analgesia administration to me including the risks and benefits. I consent to the administration of sedation/analgesia as may be necessary or desirable in the judgement of my physician.     Witness signature: ___________________________________________________ Date:  ______/______/_____ Time:  ________ A. M.  P.M. Patient Name:  ______________________________________________________  (please print)      Patient signature:  ___________________________________________________             Relationship to Patient:           []  Parent    Responsible person                          []  Spouse  In case of minor or                    [] Other  _____________   Incompetent name:  __________________________________________________                               (please print)      _____________      Responsible person  In case of minor or  Incompetent signature:  _______________________________________________    Statement of Physician  My signature below affirms that prior to the time of the procedure, I have explained to the patient and/or his/her guardian, the risks and benefits involved in the proposed treatment and any reasonable alternative to the proposed treatment. I have also explained the risks and benefits involved in the refusal of the proposed treatment and have answered the patient's questions.                         Date:  ______/______/_______  Provider                      Signature:  __________________________________________________________       Time:  ___________ A.M    P.M.

## (undated) NOTE — LETTER
[place] [address], [place], [place]  Authorization for Surgical Operation and Procedure                                                                                           I hereby authorize Aashish Jeff MD, my physician and his/her assistants (if applicable), which may include medical students, residents, and/or fellows, to perform the following surgical operation/ procedure and administer such anesthesia as may be determined necessary by my physician: Operation/Procedure name (s) COLONOSCOPY on Kirstin Sanders   2.   I recognize that during the surgical operation/procedure, unforeseen conditions may necessitate additional or different procedures than those listed above.  I, therefore, further authorize and request that the above-named surgeon, assistants, or designees perform such procedures as are, in their judgment, necessary and desirable.    3.   My surgeon/physician has discussed prior to my surgery the potential benefits, risks and side effects of this procedure; the likelihood of achieving goals; and potential problems that might occur during recuperation.  They also discussed reasonable alternatives to the procedure, including risks, benefits, and side effects related to the alternatives and risks related to not receiving this procedure.  I have had all my questions answered and I acknowledge that no guarantee has been made as to the result that may be obtained.    4.   Should the need arise during my operation/procedure, which includes change of level of care prior to discharge, I also consent to the administration of blood and/or blood products.  Further, I understand that despite careful testing and screening of blood or blood products by collecting agencies, I may still be subject to ill effects as a result of receiving a blood transfusion and/or blood products.  The following are some, but not all, of the potential risks that can occur: fever and  allergic reactions, hemolytic reactions, transmission of diseases such as Hepatitis, AIDS and Cytomegalovirus (CMV) and fluid overload.  In the event that I wish to have an autologous transfusion of my own blood, or a directed donor transfusion, I will discuss this with my physician.  Check only if Refusing Blood or Blood Products  I understand refusal of blood or blood products as deemed necessary by my physician may have serious consequences to my condition to include possible death. I hereby assume responsibility for my refusal and release the hospital, its personnel, and my physicians from any responsibility for the consequences of my refusal.    o  Refuse   5.   I authorize the use of any specimen, organs, tissues, body parts or foreign objects that may be removed from my body during the operation/procedure for diagnosis, research or teaching purposes and their subsequent disposal by hospital authorities.  I also authorize the release of specimen test results and/or written reports to my treating physician on the hospital medical staff or other referring or consulting physicians involved in my care, at the discretion of the Pathologist or my treating physician.    6.   I consent to the photographing or videotaping of the operations or procedures to be performed, including appropriate portions of my body for medical, scientific, or educational purposes, provided my identity is not revealed by the pictures or by descriptive texts accompanying them.  If the procedure has been photographed/videotaped, the surgeon will obtain the original picture, image, videotape or CD.  The hospital will not be responsible for storage, release or maintenance of the picture, image, tape or CD.    7.   I consent to the presence of a  or observers in the operating room as deemed necessary by my physician or their designees.    8.   I recognize that in the event my procedure results in extended X-Ray/fluoroscopy  time, I may develop a skin reaction.    9. If I have a Do Not Attempt Resuscitation (DNAR) order in place, that status will be suspended while in the operating room, procedural suite, and during the recovery period unless otherwise explicitly stated by me (or a person authorized to consent on my behalf). The surgeon or my attending physician will determine when the applicable recovery period ends for purposes of reinstating the DNAR order.  10. Patients having a sterilization procedure: I understand that if the procedure is successful the results will be permanent and it will therefore be impossible for me to inseminate, conceive, or bear children.  I also understand that the procedure is intended to result in sterility, although the result has not been guaranteed.   11. I acknowledge that my physician has explained sedation/analgesia administration to me including the risk and benefits I consent to the administration of sedation/analgesia as may be necessary or desirable in the judgment of my physician.    I CERTIFY THAT I HAVE READ AND FULLY UNDERSTAND THE ABOVE CONSENT TO OPERATION and/or OTHER PROCEDURE.     _________________________________________ _________________________________     ___________________________________  Signature of Patient     Signature of Responsible Person                   Printed Name of Responsible Person                              _________________________________________ ______________________________        ___________________________________  Signature of Witness         Date  Time         Relationship to Patient    STATEMENT OF PHYSICIAN My signature below affirms that prior to the time of the procedure; I have explained to the patient and/or his/her legal representative, the risks and benefits involved in the proposed treatment and any reasonable alternative to the proposed treatment. I have also explained the risks and benefits involved in refusal of the proposed treatment and  alternatives to the proposed treatment and have answered the patient's questions. If I have a significant financial interest in a co-management agreement or a significant financial interest in any product or implant, or other significant relationship used in this procedure/surgery, I have disclosed this and had a discussion with my patient.     _______________________________________________________________ _____________________________  (Signature of Physician)                                                                                         (Date)                                   (Time)  Patient Name: Kirstin Mares Marilyn    : 3/6/1960   Printed: 2024      Medical Record #: [id number]                                              Page 1 of 1

## (undated) NOTE — LETTER
June 22, 2018     [facility]      Dear Raymond Blade:    Below are the results from your recent visit:    Resulted Orders   COMP METABOLIC PANEL (14)   Result Value Ref Range    Glucose 95 70 - 99 mg/dL    Sodium 138 1 The blood cell count is normal. There is no evidence of anemia or infection. The blood sugar (glucose) level is normal. There is no evidence of diabetes.     The electrolytes levels in the blood are normal. The kidney and liver function tests are all nor

## (undated) NOTE — LETTER
[facility]  Administration of Anesthesia  I, Kirstin Sanders agree to be cared for by a physician anesthesiologist alone and/or with a nurse anesthetist, who is specially trained to monitor me and give me medicine to put me to sleep or keep me comfortable during my procedure    I understand that my anesthesiologist and/or anesthetist is not an employee or agent of [facility] or [employer]. He or she works for [facility] [employer]    As the patient asking for anesthesia services, I agree to:  Allow the anesthesiologist (anesthesia doctor) to give me medicine and do additional procedures as necessary. Some examples are: Starting or using an “IV” to give me medicine, fluids or blood during my procedure, and having a breathing tube placed to help me breathe when I’m asleep (intubation). In the event that my heart stops working properly, I understand that my anesthesiologist will make every effort to sustain my life, unless otherwise directed by [facility] Do Not Resuscitate documents.  Tell my anesthesia doctor before my procedure:  If I am pregnant.  The last time that I ate or drank.  iii. All of the medicines I take (including prescriptions, herbal supplements, and pills I can buy without a prescription (including street drugs/illegal medications). Failure to inform my anesthesiologist about these medicines may increase my risk of anesthetic complications.  iv.If I am allergic to anything or have had a reaction to anesthesia before.  I understand how the anesthesia medicine will help me (benefits).  I understand that with any type of anesthesia medicine there are risks:  The most common risks are: nausea, vomiting, sore throat, muscle soreness, damage to my eyes, mouth, or teeth (from breathing tube placement).  Rare risks include: remembering what happened during my procedure, allergic reactions to medications, injury to my airway, heart, lungs, vision, nerves,  or muscles and in extremely rare instances death.  My doctor has explained to me other choices available to me for my care (alternatives).  Pregnant Patients (“epidural”):  I understand that the risks of having an epidural (medicine given into my back to help control pain during labor), include itching, low blood pressure, difficulty urinating, headache or slowing of the baby’s heart. Very rare risks include infection, bleeding, seizure, irregular heart rhythms and nerve injury.  Regional Anesthesia (“spinal”, “epidural”, & “nerve blocks”):  I understand that rare but potential complications include headache, bleeding, infection, seizure, irregular heart rhythms, and nerve injury.    _____________________________________________________________________________  Patient (or Representative) Signature/Relationship to Patient  Date   Time    _____________________________________________________________________________   Name (if used)    Language/Organization   Time    _____________________________________________________________________________  Nurse Anesthetist Signature     Date   Time  _____________________________________________________________________________  Anesthesiologist Signature     Date   Time  I have discussed the procedure and information above with the patient (or patient’s representative) and answered their questions. The patient or their representative has agreed to have anesthesia services.    _____________________________________________________________________________  Witness        Date   Time  I have verified that the signature is that of the patient or patient’s representative, and that it was signed before the procedure  Patient Name: Kirstin Sanders     : 3/6/1960                 Printed: 2024 at 11:53 AM    Medical Record #: [id number]                                            Page 1 of 1  ----------ANESTHESIA CONSENT----------

## (undated) NOTE — ED AVS SNAPSHOT
[facility] Emergency [facility]    [address]     [place] [address]    Phone:  [phone]    Fax:  [phone]           Kuldeep Howard   MRN: [id number]    Department:  [facility]   Date of Visit:  6/14/ and Class Registration line at [phone] or find a doctor online by visiting [email or web address].    IF THERE IS ANY CHANGE OR WORSENING OF YOUR CONDITION, CALL YOUR PRIMARY CARE PHYSICIAN AT ONCE OR RETURN IMMEDIATELY TO [address] [place] [address].     If

## (undated) NOTE — LETTER
[facility]  Administration of Anesthesia  1. Anaya Guerra, or _________________________________ acting on her behalf, (Patient) (Dependent/Representative) request to receive anesthesia for my pending procedure/operation/treatment.   A infections, high spinal block, spinal bleeding, seizure, cardiac arrest and death. 7. AWARENESS: I understand that it is possible (but unlikely) to have explicit memory of events from the operating room while under general anesthesia.   8. ELECTROCONVULSIV (Date) (Time)                                                                                               (Responsible person in case of minor/ unconscious pt) /Relationship    My signature below affirms that prior to the time of the procedure, I have ex

## (undated) NOTE — ED AVS SNAPSHOT
[facility] Emergency Department    [address]     [place] [address]    Phone:  [phone]    Fax:  [phone]           Althea Leyden   MRN: [id number]    Department:  [facility] Emergency Department   Date of Visit:  6/14/ You can get these medications from any pharmacy     Bring a paper prescription for each of these medications    - TraMADol HCl 50 MG Tabs              Discharge Instructions       Use immobilizer as directed over the next several days.   Follow-up with [facility] Emergency [facility]. Follow-up care is at the discretion of that Physician.   If you need additional assistance selecting a physician, you may call the [facility] Physician Referral and Class Registration line at [phone] [id number] [address]  ([place] [address]) [phone]                Additional Information       We are concerned for your overall well being:    - If you are a smoker or have smoked in the last 12 months, we encourage you to explore op

## (undated) NOTE — MR AVS SNAPSHOT
[address] [address]  [phone]               Thank you for choosing us for your health care visit with Jordan Kang MD.  We are glad to serve you and happy to provide you with this summar may be held responsible for payment in full if proper authorization is not acquired. Please contact the Patient Business Office at [phone] if you have any questions related to insurance coverage.          Reason for Today's Visit     Other           M

## (undated) NOTE — LETTER
[address], [place]  Authorization for Invasive Procedures  1.  I hereby authorize Dr Ham Caro , my physician and whomever may be designated as the doctor's assistant, to perform the following operation and/or procedure: Cardioversion performed for the purposes of advancing medicine, science, and/or education, provided my identity is not revealed. If the procedure has been videotaped, the physician/surgeon will obtain the original videotape.  The hospital will not be responsible for stor My signature below affirms that prior to the time of the procedure, I have explained to the patient and/or her legal representative, the risks and benefits involved in the proposed treatment and any reasonable alternative to the proposed treatment.  I have

## (undated) NOTE — LETTER
24      Patient: Kirstin Sanders  : 3/6/1960 Visit date: 2024    Dear Nito,      I examined your patient in follow-up today.    She has painful triggering and locking of the right middle finger.  I would recommend a steroid injection.    Dr. Macdonald, she will need to be off Plavix 5 days prior to the injection.    Dr. Borrero, she will need to be off Eliquis 2 days prior to injection.    Gentleman, if you can clear her to be off the anticoagulants, we will proceed with the injection.    Thank you for your kind referral. If I may answer any questions, please feel free to contact me.       Sincerely,   Charlie Acevedo MD     CC: MD Nadia Mendoza MD

## (undated) NOTE — LETTER
December 8, 2017     [place] [address]      Dear Mino Avitia:    Below are the results from your recent visit:    Resulted Orders   BASIC METABOLIC PANEL (8)   Result Value Ref Range    Glucose 94 70 - 99 mg/dL    Sodium 13

## (undated) NOTE — LETTER
23      Patient: Ilene Carrillo  : 3/6/1960 Visit date: 2023    Dear Jacey Lucas,      I examined your patient in consultation today. She has painful triggering with a locked right thumb. I would recommend steroid injection. She will need to be off Eliquis for 3 days prior to the injection. Dr. Fouzia Lama, would that be possible? Thank you for your kind referral. If I may answer any questions, please feel free to contact me.      Sincerely,   Klaus Gonzalez MD     CC: Garry Denver, MD

## (undated) NOTE — LETTER
[address], [place], [place]  Authorization for Surgical Operation and Procedure                                                                                           I hereby authorize Phan Mcmahon DO, my physician and his/her assistants (if applicable), which may include medical students, residents, and/or fellows, to perform the following surgical operation/ procedure and administer such anesthesia as may be determined necessary by my physician: Operation/Procedure name (s) Exploratory laparotomy with pfannenstiel incision, total abdominal hysterectomy, bilateral salpingo oophorectomy, possible pelvic and para-aortic lymph node dissection, possible staging, possible bilateral ureterolysis on Kirstin Sanders   2.   I recognize that during the surgical operation/procedure, unforeseen conditions may necessitate additional or different procedures than those listed above.  I, therefore, further authorize and request that the above-named surgeon, assistants, or designees perform such procedures as are, in their judgment, necessary and desirable.    3.   My surgeon/physician has discussed prior to my surgery the potential benefits, risks and side effects of this procedure; the likelihood of achieving goals; and potential problems that might occur during recuperation.  They also discussed reasonable alternatives to the procedure, including risks, benefits, and side effects related to the alternatives and risks related to not receiving this procedure.  I have had all my questions answered and I acknowledge that no guarantee has been made as to the result that may be obtained.    4.   Should the need arise during my operation/procedure, which includes change of level of care prior to discharge, I also consent to the administration of blood and/or blood products.  Further, I understand that despite careful testing and screening of blood or blood products by collecting  agencies, I may still be subject to ill effects as a result of receiving a blood transfusion and/or blood products.  The following are some, but not all, of the potential risks that can occur: fever and allergic reactions, hemolytic reactions, transmission of diseases such as Hepatitis, AIDS and Cytomegalovirus (CMV) and fluid overload.  In the event that I wish to have an autologous transfusion of my own blood, or a directed donor transfusion, I will discuss this with my physician.  Check only if Refusing Blood or Blood Products  I understand refusal of blood or blood products as deemed necessary by my physician may have serious consequences to my condition to include possible death. I hereby assume responsibility for my refusal and release the hospital, its personnel, and my physicians from any responsibility for the consequences of my refusal.    o  Refuse   5.   I authorize the use of any specimen, organs, tissues, body parts or foreign objects that may be removed from my body during the operation/procedure for diagnosis, research or teaching purposes and their subsequent disposal by hospital authorities.  I also authorize the release of specimen test results and/or written reports to my treating physician on the hospital medical staff or other referring or consulting physicians involved in my care, at the discretion of the Pathologist or my treating physician.    6.   I consent to the photographing or videotaping of the operations or procedures to be performed, including appropriate portions of my body for medical, scientific, or educational purposes, provided my identity is not revealed by the pictures or by descriptive texts accompanying them.  If the procedure has been photographed/videotaped, the surgeon will obtain the original picture, image, videotape or CD.  The hospital will not be responsible for storage, release or maintenance of the picture, image, tape or CD.    7.   I consent to the presence of a   or observers in the operating room as deemed necessary by my physician or their designees.    8.   I recognize that in the event my procedure results in extended X-Ray/fluoroscopy time, I may develop a skin reaction.    9. If I have a Do Not Attempt Resuscitation (DNAR) order in place, that status will be suspended while in the operating room, procedural suite, and during the recovery period unless otherwise explicitly stated by me (or a person authorized to consent on my behalf). The surgeon or my attending physician will determine when the applicable recovery period ends for purposes of reinstating the DNAR order.  10. Patients having a sterilization procedure: I understand that if the procedure is successful the results will be permanent and it will therefore be impossible for me to inseminate, conceive, or bear children.  I also understand that the procedure is intended to result in sterility, although the result has not been guaranteed.   11. I acknowledge that my physician has explained sedation/analgesia administration to me including the risk and benefits I consent to the administration of sedation/analgesia as may be necessary or desirable in the judgment of my physician.    I CERTIFY THAT I HAVE READ AND FULLY UNDERSTAND THE ABOVE CONSENT TO OPERATION and/or OTHER PROCEDURE.     _________________________________________ _________________________________     ___________________________________  Signature of Patient     Signature of Responsible Person                   Printed Name of Responsible Person                              _________________________________________ ______________________________        ___________________________________  Signature of Witness         Date  Time         Relationship to Patient    STATEMENT OF PHYSICIAN My signature below affirms that prior to the time of the procedure; I have explained to the patient and/or his/her legal representative, the risks  and benefits involved in the proposed treatment and any reasonable alternative to the proposed treatment. I have also explained the risks and benefits involved in refusal of the proposed treatment and alternatives to the proposed treatment and have answered the patient's questions. If I have a significant financial interest in a co-management agreement or a significant financial interest in any product or implant, or other significant relationship used in this procedure/surgery, I have disclosed this and had a discussion with my patient.     _______________________________________________________________ _____________________________  (Signature of Physician)                                                                                         (Date)                                   (Time)  Patient Name: Kirstin Mares Marilyn    : 3/6/1960   Printed: 3/13/2024      Medical Record #: [id number]                                              Page 1 of 1

## (undated) NOTE — LETTER
[facility], [place]  Authorization for Invasive Procedures  1.  I hereby authorize Dr. Evie Mcdaniels , my physician and whomever may be designated as the doctor's assistant, to perform the following operation and/or procedure:  Cardiov performed for the purposes of advancing medicine, science, and/or education, provided my identity is not revealed. If the procedure has been videotaped, the physician/surgeon will obtain the original videotape.  The hospital will not be responsible for stor My signature below affirms that prior to the time of the procedure, I have explained to the patient and/or her legal representative, the risks and benefits involved in the proposed treatment and any reasonable alternative to the proposed treatment.  I have

## (undated) NOTE — LETTER
[place] [address]    Consent for Diagnostic Services    Your physician has ordered one of the following tests to determine the function of your heart: Nuclear Treadmill (Sestamibi). The information obtained will aid your physician in detecting the possible presence of heart disease, to determine why you may have such symptoms such as chest pain or shortness of breath and to determine an appropriate plan of medical management.    The risks associated with any exercise test or pharmacological stress test are similar to the risks associated with exercise, including an abnormal blood pressure, dizziness, fainting, abnormal heart rate and in very rear instances heart attach, stroke, or death. During the test you must report any unusual feeling or symptoms you experience during the test. Every effort will be made to minimize such risks by careful observation during the test. Emergency equipment and trained personnel are available to deal with unusual situations, which may arise and these personnel have permission to treat you.     During the treadmill or nuclear stress test, the exercise intensity begins at a low level and advances in stages depending on your fitness level. We may stop the test at any time because of signs of fatigue or changes in your heart rate, electrocardiogram, or blood pressure, or other symptoms you may experience.     If your doctor has ordered a pharmacological stress test, you may experience a headache, shortness of breath, flushing, warmth, rapid heart rate, or a bitter taste in your mouth. Sometimes you may not experience any symptoms. Your prompt reporting of any symptoms is very important.     During a Regadenoson stress test, you are given an injection of Regadenoson. Immediately after the Regadenoson is given, you will be injected with a radioactive isotope. During a Dobutamine stress test, Dobutamine infuses over approximately fifteen minutes. A radioactive  isotope is injected during the infusion. After either pharmacological stress test, you will have either a nuclear scan or an echocardiogram.    The information that is obtained during your testing will be treated as privileged and confidential. The information obtained will be given to your doctor and may be used for insurance purposes or to track and trend data as part of  with your privacy retained.    I have read and understand the above information. I voluntarily agree and consent to the above ordered test. Furthermore, no guarantees or assurances have been given by anyone as to the results that may be obtained from this procedure. Any questions that may have occurred to me have been answered to my satisfaction.     Signature of Patient:   ____________________________________________________________    Signature of Witness: _______________________________Date: ___________Time: ________

## (undated) NOTE — MR AVS SNAPSHOT
[address] [address]  [phone]               Thank you for choosing us for your health care visit with Kody Warren MD.  We are glad to serve you and happy to provide you with this summar [facility]  Diagnostics Main [facility], operated [facility]) ([address])  [address]   [address] [facility],  [place], [address] [facility]  [address] If you have questions, you can call [phone] to talk to our [facility] Staff. Remember, [employer] is NOT to be used for urgent needs. For medical emergencies, dial 911.            Visit [facility] online at  [email or web address]

## (undated) NOTE — LETTER
No referring provider defined for this encounter. 10/12/23        Patient: Cedrick Banks   YOB: 1960   Date of Visit: 10/12/2023       Dear  Dr. Amber Ceballos Recipients,      Thank you for referring Cedrick Banks to my practice. Please find my assessment and plan below. She would like to avoid pharmaceuticals for the  time being. Will give her a rx for alpha lipoic acid. Could  be a small fiber neuropathy.    Ddx for localization:  Lumbar spine vs. Lumbosacral plexus  Common peroneal/superficial peroneal from sciatic  And saphenous nerve  from her femoral nerve               Sincerely,   Venus Rios DO   [facility] [address], [address],  [id number] [place] [address]    Document electronically generated by:  Venus Rios DO